# Patient Record
Sex: FEMALE | Race: WHITE | Employment: OTHER | URBAN - METROPOLITAN AREA
[De-identification: names, ages, dates, MRNs, and addresses within clinical notes are randomized per-mention and may not be internally consistent; named-entity substitution may affect disease eponyms.]

---

## 2017-02-02 ENCOUNTER — TELEPHONE (OUTPATIENT)
Dept: CARDIOLOGY CLINIC | Age: 72
End: 2017-02-02

## 2017-02-03 DIAGNOSIS — R00.2 PALPITATIONS: Primary | ICD-10-CM

## 2017-02-23 PROCEDURE — 93272 ECG/REVIEW INTERPRET ONLY: CPT | Performed by: INTERNAL MEDICINE

## 2017-03-10 ENCOUNTER — TELEPHONE (OUTPATIENT)
Dept: CARDIOLOGY CLINIC | Age: 72
End: 2017-03-10

## 2017-03-10 DIAGNOSIS — R00.2 PALPITATIONS: ICD-10-CM

## 2017-09-18 ENCOUNTER — HOSPITAL ENCOUNTER (OUTPATIENT)
Dept: MAMMOGRAPHY | Age: 72
Discharge: HOME | End: 2017-09-18
Attending: OBSTETRICS & GYNECOLOGY | Admitting: OBSTETRICS & GYNECOLOGY

## 2017-09-18 DIAGNOSIS — Z12.31 VISIT FOR SCREENING MAMMOGRAM: ICD-10-CM

## 2018-08-06 ENCOUNTER — OFFICE VISIT (OUTPATIENT)
Dept: CARDIOLOGY CLINIC | Age: 73
End: 2018-08-06

## 2018-08-06 VITALS
HEART RATE: 73 BPM | OXYGEN SATURATION: 95 % | DIASTOLIC BLOOD PRESSURE: 80 MMHG | SYSTOLIC BLOOD PRESSURE: 130 MMHG | BODY MASS INDEX: 22.16 KG/M2 | WEIGHT: 133 LBS | HEIGHT: 65 IN

## 2018-08-06 DIAGNOSIS — I48.91 ATRIAL FIBRILLATION, UNSPECIFIED TYPE (HCC): ICD-10-CM

## 2018-08-06 DIAGNOSIS — Z76.89 ESTABLISHING CARE WITH NEW DOCTOR, ENCOUNTER FOR: Primary | ICD-10-CM

## 2018-08-06 PROCEDURE — 3017F COLORECTAL CA SCREEN DOC REV: CPT | Performed by: INTERNAL MEDICINE

## 2018-08-06 PROCEDURE — 93000 ELECTROCARDIOGRAM COMPLETE: CPT | Performed by: INTERNAL MEDICINE

## 2018-08-06 PROCEDURE — 99204 OFFICE O/P NEW MOD 45 MIN: CPT | Performed by: INTERNAL MEDICINE

## 2018-08-06 PROCEDURE — G8420 CALC BMI NORM PARAMETERS: HCPCS | Performed by: INTERNAL MEDICINE

## 2018-08-06 PROCEDURE — 1101F PT FALLS ASSESS-DOCD LE1/YR: CPT | Performed by: INTERNAL MEDICINE

## 2018-08-06 PROCEDURE — G8427 DOCREV CUR MEDS BY ELIG CLIN: HCPCS | Performed by: INTERNAL MEDICINE

## 2018-08-06 PROCEDURE — 1090F PRES/ABSN URINE INCON ASSESS: CPT | Performed by: INTERNAL MEDICINE

## 2018-08-06 RX ORDER — PHENOL 1.4 %
1 AEROSOL, SPRAY (ML) MUCOUS MEMBRANE 2 TIMES DAILY PRN
COMMUNITY

## 2018-08-06 NOTE — PROGRESS NOTES
Negative. Gastrointestinal: Negative. Genitourinary: Negative. Musculoskeletal: Negative. Skin: Negative. Neurological: Negative. Hematological: Negative. Psychiatric/Behavioral: Negative. /80   Pulse 73   Ht 5' 4.9\" (1.648 m)   Wt 133 lb (60.3 kg)   SpO2 95%   BMI 22.20 kg/m²       Objective:  Physical Exam   Constitutional: She is oriented to person, place, and time. She appears well-developed and well-nourished. HENT:   Head: Normocephalic and atraumatic. Eyes: Pupils are equal, round, and reactive to light. Neck: Normal range of motion. Cardiovascular: Normal rate, regular rhythm and normal heart sounds. Pulmonary/Chest: Effort normal and breath sounds normal.   Abdominal: Soft. No tenderness. Musculoskeletal: Normal range of motion. She exhibits no edema. Neurological: She is alert and oriented to person, place, and time. Skin: Skin is warm and dry. Psychiatric: She has a normal mood and affect. Assessment:  1. PAF-cryoablation 3/9/12  2. PVC's- not quantified, appear uniform        Plan:  1. Remain active   2. Holter monitor to quantitate PVC's  3.  Follow up with me in 6 months     Cordelia Ribeiro M.D.

## 2018-08-14 PROCEDURE — 93225 XTRNL ECG REC<48 HRS REC: CPT | Performed by: INTERNAL MEDICINE

## 2018-08-17 PROCEDURE — 93227 XTRNL ECG REC<48 HR R&I: CPT | Performed by: INTERNAL MEDICINE

## 2018-09-07 DIAGNOSIS — I48.91 ATRIAL FIBRILLATION, UNSPECIFIED TYPE (HCC): ICD-10-CM

## 2018-09-14 ENCOUNTER — TELEPHONE (OUTPATIENT)
Dept: CARDIOLOGY CLINIC | Age: 73
End: 2018-09-14

## 2018-09-25 ENCOUNTER — OFFICE VISIT (OUTPATIENT)
Dept: ORTHOPEDIC SURGERY | Age: 73
End: 2018-09-25
Payer: MEDICARE

## 2018-09-25 VITALS — WEIGHT: 130 LBS | BODY MASS INDEX: 21.66 KG/M2 | HEIGHT: 65 IN

## 2018-09-25 DIAGNOSIS — M25.511 RIGHT SHOULDER PAIN, UNSPECIFIED CHRONICITY: Primary | ICD-10-CM

## 2018-09-25 DIAGNOSIS — M75.41 IMPINGEMENT SYNDROME OF RIGHT SHOULDER: ICD-10-CM

## 2018-09-25 PROCEDURE — G8427 DOCREV CUR MEDS BY ELIG CLIN: HCPCS | Performed by: PHYSICIAN ASSISTANT

## 2018-09-25 PROCEDURE — G8399 PT W/DXA RESULTS DOCUMENT: HCPCS | Performed by: PHYSICIAN ASSISTANT

## 2018-09-25 PROCEDURE — 1101F PT FALLS ASSESS-DOCD LE1/YR: CPT | Performed by: PHYSICIAN ASSISTANT

## 2018-09-25 PROCEDURE — 4040F PNEUMOC VAC/ADMIN/RCVD: CPT | Performed by: PHYSICIAN ASSISTANT

## 2018-09-25 PROCEDURE — G8420 CALC BMI NORM PARAMETERS: HCPCS | Performed by: PHYSICIAN ASSISTANT

## 2018-09-25 PROCEDURE — 99213 OFFICE O/P EST LOW 20 MIN: CPT | Performed by: PHYSICIAN ASSISTANT

## 2018-09-25 PROCEDURE — 1036F TOBACCO NON-USER: CPT | Performed by: PHYSICIAN ASSISTANT

## 2018-09-25 PROCEDURE — 3017F COLORECTAL CA SCREEN DOC REV: CPT | Performed by: PHYSICIAN ASSISTANT

## 2018-09-25 PROCEDURE — 1123F ACP DISCUSS/DSCN MKR DOCD: CPT | Performed by: PHYSICIAN ASSISTANT

## 2018-09-25 PROCEDURE — 1090F PRES/ABSN URINE INCON ASSESS: CPT | Performed by: PHYSICIAN ASSISTANT

## 2018-09-25 PROCEDURE — 20610 DRAIN/INJ JOINT/BURSA W/O US: CPT | Performed by: PHYSICIAN ASSISTANT

## 2018-09-25 RX ORDER — IBUPROFEN 200 MG
200 TABLET ORAL EVERY 6 HOURS PRN
Status: ON HOLD | COMMUNITY
End: 2021-07-15

## 2018-09-25 NOTE — PROGRESS NOTES
Bupivacaine . 25% 125mg/50mL NDC: [id number]  4cc  Lot # [id number]  Exp 6/22  Carbocaine 2% NDC: 1218-6357-09  4cc  Lot # [id number] Exp 1/1/2020  Depo-Medrol 40mg/mL  NDC: 0615-1906-20  2cc  Lot# [id number]  Exp  08/2020    SITE: RIGHT SHLD SUBACROMIAL
Laterality Date    ATRIAL ABLATION SURGERY  3/2012     Current Medications:     Current Outpatient Prescriptions:     ibuprofen (ADVIL;MOTRIN) 200 MG tablet, Take 200 mg by mouth every 6 hours as needed for Pain, Disp: , Rfl:     calcium carbonate 600 MG TABS tablet, Take 1 tablet by mouth 2 times daily as needed, Disp: , Rfl:     venlafaxine (EFFEXOR-XR) 75 MG XR capsule, Take 75 mg by mouth daily. , Disp: , Rfl:     diclofenac (VOLTAREN) 75 MG EC tablet, Take 1 tablet by mouth 2 times daily, Disp: 42 tablet, Rfl: 0    aspirin 81 MG tablet, Take 81 mg by mouth daily. , Disp: , Rfl:   Allergies:  Cephalexin and Pcn [penicillins]  Social History:    reports that she quit smoking about 39 years ago. She has never used smokeless tobacco. She reports that she drinks about 3.0 oz of alcohol per week . She reports that she does not use drugs. Family History:   No family history on file. REVIEW OF SYSTEMS:   For new problems, a full review of systems will be found scanned in the patient's chart. CONSTITUTIONAL: Denies unexplained weight loss, fevers, chills   NEUROLOGICAL: Denies unsteady gait or progressive weakness  SKIN: Denies skin changes, delayed healing, rash, itching       PHYSICAL EXAM:    Vitals: Height 5' 5\" (1.651 m), weight 130 lb (59 kg). GENERAL EXAM:  · General Apparence: Patient is adequately groomed with no evidence of malnutrition. · Orientation: The patient is oriented to time, place and person. · Mood & Affect:The patient's mood and affect are appropriate       RIGHT shoulder PHYSICAL EXAMINATION:  · Inspection:  No deformity ecchymosis or erythema    · Palpation:  No tenderness at the a.c. joint or distal acromion, mild meniscus couple tenderness through the trapezius      · Range of Motion: 0-180° of forward flexion, 0-160° of abduction. Bicep and tricep function are intact    · Strength: Mild to moderate pain but no weakness with supraspinatus testing.     · Special Tests:  She can

## 2018-10-09 ENCOUNTER — HOSPITAL ENCOUNTER (OUTPATIENT)
Dept: WOMENS IMAGING | Age: 73
Discharge: HOME | End: 2018-10-09
Payer: MEDICARE

## 2018-10-09 DIAGNOSIS — Z12.31 VISIT FOR SCREENING MAMMOGRAM: ICD-10-CM

## 2018-10-09 PROCEDURE — 77063 BREAST TOMOSYNTHESIS BI: CPT

## 2018-11-12 ENCOUNTER — OFFICE VISIT (OUTPATIENT)
Dept: ORTHOPEDIC SURGERY | Age: 73
End: 2018-11-12
Payer: MEDICARE

## 2018-11-12 VITALS — WEIGHT: 130.07 LBS | BODY MASS INDEX: 21.67 KG/M2 | HEIGHT: 65 IN

## 2018-11-12 DIAGNOSIS — M54.50 LUMBAR SPINE PAIN: ICD-10-CM

## 2018-11-12 DIAGNOSIS — M25.511 RIGHT SHOULDER PAIN, UNSPECIFIED CHRONICITY: Primary | ICD-10-CM

## 2018-11-12 PROCEDURE — G8427 DOCREV CUR MEDS BY ELIG CLIN: HCPCS | Performed by: PHYSICIAN ASSISTANT

## 2018-11-12 PROCEDURE — G8420 CALC BMI NORM PARAMETERS: HCPCS | Performed by: PHYSICIAN ASSISTANT

## 2018-11-12 PROCEDURE — 4040F PNEUMOC VAC/ADMIN/RCVD: CPT | Performed by: PHYSICIAN ASSISTANT

## 2018-11-12 PROCEDURE — 99214 OFFICE O/P EST MOD 30 MIN: CPT | Performed by: PHYSICIAN ASSISTANT

## 2018-11-12 PROCEDURE — 1123F ACP DISCUSS/DSCN MKR DOCD: CPT | Performed by: PHYSICIAN ASSISTANT

## 2018-11-12 PROCEDURE — G8484 FLU IMMUNIZE NO ADMIN: HCPCS | Performed by: PHYSICIAN ASSISTANT

## 2018-11-12 PROCEDURE — 1090F PRES/ABSN URINE INCON ASSESS: CPT | Performed by: PHYSICIAN ASSISTANT

## 2018-11-12 PROCEDURE — G8399 PT W/DXA RESULTS DOCUMENT: HCPCS | Performed by: PHYSICIAN ASSISTANT

## 2018-11-12 PROCEDURE — 1101F PT FALLS ASSESS-DOCD LE1/YR: CPT | Performed by: PHYSICIAN ASSISTANT

## 2018-11-12 PROCEDURE — 3017F COLORECTAL CA SCREEN DOC REV: CPT | Performed by: PHYSICIAN ASSISTANT

## 2018-11-12 PROCEDURE — 1036F TOBACCO NON-USER: CPT | Performed by: PHYSICIAN ASSISTANT

## 2018-11-12 RX ORDER — TIZANIDINE 4 MG/1
4 TABLET ORAL 2 TIMES DAILY PRN
Qty: 40 TABLET | Refills: 0 | Status: ON HOLD | OUTPATIENT
Start: 2018-11-12 | End: 2021-07-15

## 2018-11-13 ENCOUNTER — HOSPITAL ENCOUNTER (OUTPATIENT)
Dept: MRI IMAGING | Age: 73
Discharge: HOME | End: 2018-11-13
Payer: MEDICARE

## 2018-11-13 DIAGNOSIS — M25.511 RIGHT SHOULDER PAIN, UNSPECIFIED CHRONICITY: ICD-10-CM

## 2018-11-13 PROCEDURE — 73221 MRI JOINT UPR EXTREM W/O DYE: CPT

## 2018-11-19 ENCOUNTER — OFFICE VISIT (OUTPATIENT)
Dept: ORTHOPEDIC SURGERY | Age: 73
End: 2018-11-19
Payer: MEDICARE

## 2018-11-19 VITALS — BODY MASS INDEX: 21.67 KG/M2 | WEIGHT: 130.07 LBS | HEIGHT: 65 IN

## 2018-11-19 DIAGNOSIS — M75.121 COMPLETE TEAR OF RIGHT ROTATOR CUFF: Primary | ICD-10-CM

## 2018-11-19 PROCEDURE — 4040F PNEUMOC VAC/ADMIN/RCVD: CPT | Performed by: ORTHOPAEDIC SURGERY

## 2018-11-19 PROCEDURE — 1101F PT FALLS ASSESS-DOCD LE1/YR: CPT | Performed by: ORTHOPAEDIC SURGERY

## 2018-11-19 PROCEDURE — 1090F PRES/ABSN URINE INCON ASSESS: CPT | Performed by: ORTHOPAEDIC SURGERY

## 2018-11-19 PROCEDURE — 1036F TOBACCO NON-USER: CPT | Performed by: ORTHOPAEDIC SURGERY

## 2018-11-19 PROCEDURE — G8427 DOCREV CUR MEDS BY ELIG CLIN: HCPCS | Performed by: ORTHOPAEDIC SURGERY

## 2018-11-19 PROCEDURE — G8420 CALC BMI NORM PARAMETERS: HCPCS | Performed by: ORTHOPAEDIC SURGERY

## 2018-11-19 PROCEDURE — G8484 FLU IMMUNIZE NO ADMIN: HCPCS | Performed by: ORTHOPAEDIC SURGERY

## 2018-11-19 PROCEDURE — 99214 OFFICE O/P EST MOD 30 MIN: CPT | Performed by: ORTHOPAEDIC SURGERY

## 2018-11-19 PROCEDURE — 1123F ACP DISCUSS/DSCN MKR DOCD: CPT | Performed by: ORTHOPAEDIC SURGERY

## 2018-11-19 PROCEDURE — 3017F COLORECTAL CA SCREEN DOC REV: CPT | Performed by: ORTHOPAEDIC SURGERY

## 2018-11-19 PROCEDURE — G8399 PT W/DXA RESULTS DOCUMENT: HCPCS | Performed by: ORTHOPAEDIC SURGERY

## 2018-11-19 NOTE — PROGRESS NOTES
CHIEF COMPLAINT:    Chief Complaint   Patient presents with    Results     TR MRI RIGHT SHOULDER       HISTORY OF PRESENT ILLNESS:                The patient is a 68 y.o. female   Past Medical History:   Diagnosis Date    Anesthesia complication     Low B/P after atrial ablation (SBP 60)    Anxiety     Atrial fibrillation (HCC)     CAD (coronary artery disease) 5/11/12    at fib    Rotator cuff tendinitis 2/15/2016    This pleasant lady returns for evaluation after MRI scan. She is referred to shoulder 3 years ago and went to physical therapy. She completely recovered and then was doing some work at several weeks ago in September and had acute onset of pain with significant disability involving the shoulder. With ongoing symptoms and MRI scan was recommended. She still has pain. Shot helped for several weeks but has worn off from September. Work Status:      The pain assessment was noted & is as follows:  Pain Assessment  Location of Pain: Shoulder  Location Modifiers: Right  Severity of Pain: 8  Quality of Pain: Aching  Duration of Pain: A few minutes  Frequency of Pain: Intermittent  Aggravating Factors: Bending, Straightening, Stretching, Exercise]      Work Status/Functionality:     Past Medical History: Medical history form was reviewed today & can be found in the media tab  Past Medical History:   Diagnosis Date    Anesthesia complication     Low B/P after atrial ablation (SBP 60)    Anxiety     Atrial fibrillation (HCC)     CAD (coronary artery disease) 5/11/12    at fib    Rotator cuff tendinitis 2/15/2016      Past Surgical History:     Past Surgical History:   Procedure Laterality Date    ATRIAL ABLATION SURGERY  3/2012     Current Medications:     Current Outpatient Prescriptions:     tiZANidine (ZANAFLEX) 4 MG tablet, Take 1 tablet by mouth 2 times daily as needed (muscle spasms), Disp: 40 tablet, Rfl: 0    ibuprofen (ADVIL;MOTRIN) 200 MG tablet, Take 200 mg by mouth every 6

## 2018-11-27 ENCOUNTER — HOSPITAL ENCOUNTER (OUTPATIENT)
Dept: PHYSICAL THERAPY | Age: 73
Setting detail: THERAPIES SERIES
Discharge: HOME | End: 2018-11-27
Payer: MEDICARE

## 2018-11-27 PROCEDURE — G8984 CARRY CURRENT STATUS: HCPCS | Performed by: PHYSICAL THERAPIST

## 2018-11-27 PROCEDURE — 97162 PT EVAL MOD COMPLEX 30 MIN: CPT | Performed by: PHYSICAL THERAPIST

## 2018-11-27 PROCEDURE — G0283 ELEC STIM OTHER THAN WOUND: HCPCS | Performed by: PHYSICAL THERAPIST

## 2018-11-27 PROCEDURE — 97140 MANUAL THERAPY 1/> REGIONS: CPT | Performed by: PHYSICAL THERAPIST

## 2018-11-27 PROCEDURE — G8985 CARRY GOAL STATUS: HCPCS | Performed by: PHYSICAL THERAPIST

## 2018-11-27 PROCEDURE — 97110 THERAPEUTIC EXERCISES: CPT | Performed by: PHYSICAL THERAPIST

## 2018-11-27 NOTE — FLOWSHEET NOTE
[facility],  [address]  Phone: [phone]  Fax [phone]      Physical Therapy Daily Treatment Note  Date:  2018    Patient Name:  Chris Dong    :  1945  MRN: [id number]  Restrictions/Precautions:    Medical/Treatment Diagnosis Information:  · Diagnosis: M75.121 tear of R Rotator cuff  · Treatment Diagnosis: M75.121 tear of R Rotator cuff, R shld pain J83.899D  Insurance/Certification information:  PT Insurance Information: / AARP  Physician Information:  Referring Practitioner: Dr. Angelic Plascencia of care signed (Y/N):     Date of Patient follow up with Physician: 18    G-Code (if applicable):      Date G-Code Applied:    PT G-Codes  Functional Assessment Tool Used: Quick dash  Score: 64%  Functional Limitation: Carrying, moving and handling objects  Carrying, Moving and Handling Objects Current Status (): At least 60 percent but less than 80 percent impaired, limited or restricted  Carrying, Moving and Handling Objects Goal Status ():  At least 20 percent but less than 40 percent impaired, limited or restricted    Progress Note: [x]  Yes  []  No  Next due by: Visit #10      Latex Allergy:  [x]NO      []YES  Preferred Language for Healthcare:   [x]English       []other:    Visit # Insurance Allowable Requires auth   1 /AARP    [x]no        []yes:     Pain level:  2-8/10     SUBJECTIVE:  See eval    OBJECTIVE: See eval  Observation:   Test measurements:      RESTRICTIONS/PRECAUTIONS: MRI: full thickness supraspinatus and mild involvement sub-scap    Exercises/Interventions:   Therapeutic Ex Sets/rep comments        Table slides flex/scap H10x10 reps HEP        No money H5 x10 reps HEP        shld ext clarice H5 x10 reps HEP                                                                    Manual Intervention     GH joint mobs A-P/inferior 8' Less pain with abd/flex AROM after to co-morbidities. [x] Plan just implemented, too soon to assess goals progression  [] Other:     ASSESSMENT:  See eval    Treatment/Activity Tolerance:  [x] Patient tolerated treatment well [] Patient limited by fatique  [] Patient limited by pain  [] Patient limited by other medical complications  [] Other:     Prognosis: [x] Good [x] Fair  [] Poor    Patient Requires Follow-up: [x] Yes  [] No    PLAN: See eval. Progress scapular stabilization as tolerated.   [] Continue per plan of care [] Alter current plan (see comments)  [x] Plan of care initiated [] Hold pending MD visit [] Discharge    Electronically signed by: Dasiy Osullivan, PT

## 2018-11-27 NOTE — PLAN OF CARE
Review Of Systems (ROS):  [x]Performed Review of systems (Integumentary, CardioPulmonary, Neurological) by intake and observation. Intake form has been scanned into medical record. Patient has been instructed to contact their primary care physician regarding ROS issues if not already being addressed at this time. Co-morbidities/Complexities (which will affect course of rehabilitation):   []None           Arthritic conditions   []Rheumatoid arthritis (M05.9)  []Osteoarthritis (M19.91)   Cardiovascular conditions   [x]Hypertension (I10)  []Hyperlipidemia (E78.5)  []Angina pectoris (I20)  []Atherosclerosis (I70)   Musculoskeletal conditions   []Disc pathology   []Congenital spine pathologies   []Prior surgical intervention  []Osteoporosis (M81.8)  [x]Osteopenia (M85.8)   Endocrine conditions   []Hypothyroid (E03.9)  []Hyperthyroid Gastrointestinal conditions   []Constipation (X89.37)   Metabolic conditions   []Morbid obesity (E66.01)  []Diabetes type 1(E10.65) or 2 (E11.65)   []Neuropathy (G60.9)     Pulmonary conditions   []Asthma (J45)  []Coughing   []COPD (J44.9)   Psychological Disorders  [x]Anxiety (F41.9)  []Depression (F32.9)   []Other:   [x]Other:    Full thickness tear of supraspinatus       Barriers to/and or personal factors that will affect rehab potential:              []Age  []Sex              []Motivation/Lack of Motivation                        [x]Co-Morbidities              []Cognitive Function, education/learning barriers              []Environmental, home barriers              []profession/work barriers  [x]past PT/medical experience  []other:  Justification:  No prior relief noted for R shld with previous therapy     Falls Risk Assessment (30 days):   [x] Falls Risk assessed and no intervention required.   [] Falls Risk assessed and Patient requires intervention due to being higher risk   TUG score (>12s at risk):     [] Falls education provided, including       G-Codes:  PT

## 2018-11-30 ENCOUNTER — HOSPITAL ENCOUNTER (OUTPATIENT)
Dept: PHYSICAL THERAPY | Age: 73
Setting detail: THERAPIES SERIES
Discharge: HOME | End: 2018-11-30
Payer: MEDICARE

## 2018-11-30 PROCEDURE — 97140 MANUAL THERAPY 1/> REGIONS: CPT | Performed by: PHYSICAL THERAPIST

## 2018-11-30 PROCEDURE — G0283 ELEC STIM OTHER THAN WOUND: HCPCS | Performed by: PHYSICAL THERAPIST

## 2018-11-30 PROCEDURE — 97110 THERAPEUTIC EXERCISES: CPT | Performed by: PHYSICAL THERAPIST

## 2018-11-30 PROCEDURE — 97112 NEUROMUSCULAR REEDUCATION: CPT | Performed by: PHYSICAL THERAPIST

## 2018-11-30 NOTE — FLOWSHEET NOTE
shld after treatment    Charges:  Timed Code Treatment Minutes: 40'   Total Treatment Minutes: 54'     [] EVAL (LOW) 05652 (typically 20 minutes face-to-face)  [x] EVAL (MOD) 73316 (typically 30 minutes face-to-face)  [] EVAL (HIGH) 25676 (typically 45 minutes face-to-face)  [] RE-EVAL     [x] RK(30916) x  1   [] IONTO  [x] NMR (15084) x  1   [] VASO  [x] Manual (40061) x  1    [] Other:  [] TA x       [] Mech Traction (18413)  [] ES(attended) (28634)      [x] ES (un) (39630):     GOALS: (cut and paste from eval)  Patient stated goal: Decrease pain     Therapist goals for Patient:   Short Term Goals: To be achieved in: 2 weeks  1. Independent in HEP and progression per patient tolerance, in order to prevent re-injury. 2. Patient will have a decrease in pain to facilitate improvement in movement, function, and ADLs as indicated by Functional Deficits.     Long Term Goals: To be achieved in: 6 weeks  1. Disability index score of 35% or less for the [facility] to assist with reaching prior level of function. 2. Patient will demonstrate increased AROM to WNL without pain to allow for proper joint functioning as indicated by patients Functional Deficits. 3. Patient will demonstrate an increase in Strength to 4/5 to allow for proper functional mobility as indicated by patients Functional Deficits. 4. Patient will return to reaching overhead to fix and dry hair functional activities without increased symptoms or restriction. 5. Patient to be able to open a jar without shld pain and weakness(patient specific functional goal)                  Progression Towards Functional goals:  [] Patient is progressing as expected towards functional goals listed. [] Progression is slowed due to complexities listed. [] Progression has been slowed due to co-morbidities. [x] Plan just implemented, too soon to assess goals progression  [] Other:     ASSESSMENT: Increased PROM noted.     Treatment/Activity Tolerance:  [x] Patient tolerated treatment well [] Patient limited by fatique  [] Patient limited by pain  [] Patient limited by other medical complications  [] Other:     Prognosis: [x] Good [x] Fair  [] Poor    Patient Requires Follow-up: [x] Yes  [] No    PLAN: See eval. Progress scapular stabilization as tolerated.   [x] Continue per plan of care [] Alter current plan (see comments)  [] Plan of care initiated [] Hold pending MD visit [] Discharge    Electronically signed by: Isabel Edwards PT

## 2018-12-04 ENCOUNTER — HOSPITAL ENCOUNTER (OUTPATIENT)
Dept: PHYSICAL THERAPY | Age: 73
Setting detail: THERAPIES SERIES
Discharge: HOME | End: 2018-12-04
Payer: MEDICARE

## 2018-12-04 PROCEDURE — G0283 ELEC STIM OTHER THAN WOUND: HCPCS | Performed by: PHYSICAL THERAPIST

## 2018-12-04 PROCEDURE — 97112 NEUROMUSCULAR REEDUCATION: CPT | Performed by: PHYSICAL THERAPIST

## 2018-12-04 PROCEDURE — 97140 MANUAL THERAPY 1/> REGIONS: CPT | Performed by: PHYSICAL THERAPIST

## 2018-12-04 PROCEDURE — 97110 THERAPEUTIC EXERCISES: CPT | Performed by: PHYSICAL THERAPIST

## 2018-12-07 ENCOUNTER — HOSPITAL ENCOUNTER (OUTPATIENT)
Dept: PHYSICAL THERAPY | Age: 73
Setting detail: THERAPIES SERIES
Discharge: HOME | End: 2018-12-07
Payer: MEDICARE

## 2018-12-07 PROCEDURE — G0283 ELEC STIM OTHER THAN WOUND: HCPCS | Performed by: PHYSICAL THERAPIST

## 2018-12-07 PROCEDURE — 97140 MANUAL THERAPY 1/> REGIONS: CPT | Performed by: PHYSICAL THERAPIST

## 2018-12-07 PROCEDURE — 97110 THERAPEUTIC EXERCISES: CPT | Performed by: PHYSICAL THERAPIST

## 2018-12-07 PROCEDURE — 97112 NEUROMUSCULAR REEDUCATION: CPT | Performed by: PHYSICAL THERAPIST

## 2018-12-11 ENCOUNTER — HOSPITAL ENCOUNTER (OUTPATIENT)
Dept: PHYSICAL THERAPY | Age: 73
Setting detail: THERAPIES SERIES
Discharge: HOME | End: 2018-12-11
Payer: MEDICARE

## 2018-12-11 PROCEDURE — 97112 NEUROMUSCULAR REEDUCATION: CPT | Performed by: PHYSICAL THERAPIST

## 2018-12-11 PROCEDURE — G0283 ELEC STIM OTHER THAN WOUND: HCPCS | Performed by: PHYSICAL THERAPIST

## 2018-12-11 PROCEDURE — 97140 MANUAL THERAPY 1/> REGIONS: CPT | Performed by: PHYSICAL THERAPIST

## 2018-12-11 PROCEDURE — 97110 THERAPEUTIC EXERCISES: CPT | Performed by: PHYSICAL THERAPIST

## 2018-12-14 ENCOUNTER — HOSPITAL ENCOUNTER (OUTPATIENT)
Dept: PHYSICAL THERAPY | Age: 73
Setting detail: THERAPIES SERIES
Discharge: HOME | End: 2018-12-14
Payer: MEDICARE

## 2018-12-14 PROCEDURE — 97140 MANUAL THERAPY 1/> REGIONS: CPT | Performed by: PHYSICAL THERAPIST

## 2018-12-14 PROCEDURE — G0283 ELEC STIM OTHER THAN WOUND: HCPCS | Performed by: PHYSICAL THERAPIST

## 2018-12-14 PROCEDURE — 97112 NEUROMUSCULAR REEDUCATION: CPT | Performed by: PHYSICAL THERAPIST

## 2018-12-14 PROCEDURE — 97110 THERAPEUTIC EXERCISES: CPT | Performed by: PHYSICAL THERAPIST

## 2018-12-14 NOTE — FLOWSHEET NOTE
[facility], [address] [place]  Phone: [phone]  Fax [phone]      Physical Therapy Daily Treatment Note  Date:  2018    Patient Name:  Tosha Fleming \"Anjali\"   :  1945  MRN: [id number]  Restrictions/Precautions:    Medical/Treatment Diagnosis Information:  · Diagnosis: M75.121 tear of R Rotator cuff  · Treatment Diagnosis: M75.121 tear of R Rotator cuff, R shld pain P76.547W  Insurance/Certification information:  PT Insurance Information: / AARP  Physician Information:  Referring Practitioner: Dr. [facility] of care signed (Y/N):     Date of Patient follow up with Physician: 18    G-Code (if applicable):      Date G-Code Applied:         Progress Note: []  Yes  [x]  No  Next due by: Visit #10      Latex Allergy:  [x]NO      []YES  Preferred Language for Healthcare:   [x]English       []other:    Visit # Insurance Allowable Requires auth   6 MC/AARP    [x]no        []yes:     Pain level:  2-5/10     SUBJECTIVE:  Reports no pain at rest. With movement pain is 5/10 with movement. Reports feeling better from last wek with R shld.  Reports overall 30%-40%  Improvement since starting PT.  OBJECTIVE:   Observation:   Test measurements:      RESTRICTIONS/PRECAUTIONS: MRI: full thickness supraspinatus and mild involvement sub-scap    Exercises/Interventions:   Therapeutic Ex Sets/rep comments        Table slides flex/scap H10x10 reps HEP        No money YTB H5 2 x10 reps + YTB HEP        shld ext clarice HEP   shld abd/Er  HEP   SA punches 1# 2x15    Supine alphabet 1#RTB ER/IR walkouts H5 x10 reps    RTB Rows/Ext H5 2x10 reps  HEP   SL ER H5 2x10 reps    SL horizontal abd 1# 2x10    SL abd 1# 2x10 reps    IR with hand assist     Posterior capsule stretches against wall H20 x 5 reps    Prone shld ext/Rows          Finisher push/pull/ladders/ circles/arcs/diagonals 15 reps each 5#   Pulleys 20x Manual Intervention     GH joint mobs A-P/inferior 8' Less pain with abd/flex AROM after treatment                                 NMR re-education                                                 Therapeutic Exercise and NMR EXR  [x] (97129) Provided verbal/tactile cueing for activities related to strengthening, flexibility, endurance, ROM  for improvements in scapular, scapulothoracic and UE control with self care, reaching, carrying, lifting, house/yardwork, driving/computer work.    [] (62174) Provided verbal/tactile cueing for activities related to improving balance, coordination, kinesthetic sense, posture, motor skill, proprioception  to assist with  scapular, scapulothoracic and UE control with self care, reaching, carrying, lifting, house/yardwork, driving/computer work. Therapeutic Activities:    [] (69040 or 32292) Provided verbal/tactile cueing for activities related to improving balance, coordination, kinesthetic sense, posture, motor skill, proprioception and motor activation to allow for proper function of scapular, scapulothoracic and UE control with self care, carrying, lifting, driving/computer work.      Home Exercise Program:    [x] (97429) Reviewed/Progressed HEP activities related to strengthening, flexibility, endurance, ROM of scapular, scapulothoracic and UE control with self care, reaching, carrying, lifting, house/yardwork, driving/computer work  [] (01641) Reviewed/Progressed HEP activities related to improving balance, coordination, kinesthetic sense, posture, motor skill, proprioception of scapular, scapulothoracic and UE control with self care, reaching, carrying, lifting, house/yardwork, driving/computer work      Manual Treatments:  PROM / STM / Oscillations-Mobs:  G-I, II, III, IV (PA's, Inf., Post.)  [x] (94285) Provided manual therapy to mobilize soft tissue/joints of cervical/CT, scapular GHJ and UE for the purpose of modulating pain, promoting relaxation,  increasing ROM, [] Progression is slowed due to complexities listed. [] Progression has been slowed due to co-morbidities. [] Plan just implemented, too soon to assess goals progression  [] Other:     ASSESSMENT: Increased PROM noted. Increased AROM behind back. Treatment/Activity Tolerance:  [x] Patient tolerated treatment well [] Patient limited by fatique  [] Patient limited by pain  [] Patient limited by other medical complications  [] Other:     Prognosis: [x] Good [x] Fair  [] Poor    Patient Requires Follow-up: [x] Yes  [] No    PLAN: See eval. Progress scapular stabilization as tolerated.   [x] Continue per plan of care [] Alter current plan (see comments)  [] Plan of care initiated [] Hold pending MD visit [] Discharge    Electronically signed by: Leslee Hager PT

## 2018-12-17 ENCOUNTER — OFFICE VISIT (OUTPATIENT)
Dept: ORTHOPEDIC SURGERY | Age: 73
End: 2018-12-17
Payer: MEDICARE

## 2018-12-17 VITALS — HEIGHT: 65 IN | BODY MASS INDEX: 21.67 KG/M2 | WEIGHT: 130.07 LBS

## 2018-12-17 DIAGNOSIS — M75.121 COMPLETE TEAR OF RIGHT ROTATOR CUFF: Primary | ICD-10-CM

## 2018-12-17 PROCEDURE — 4040F PNEUMOC VAC/ADMIN/RCVD: CPT | Performed by: PHYSICIAN ASSISTANT

## 2018-12-17 PROCEDURE — G8427 DOCREV CUR MEDS BY ELIG CLIN: HCPCS | Performed by: PHYSICIAN ASSISTANT

## 2018-12-17 PROCEDURE — G8484 FLU IMMUNIZE NO ADMIN: HCPCS | Performed by: PHYSICIAN ASSISTANT

## 2018-12-17 PROCEDURE — 1123F ACP DISCUSS/DSCN MKR DOCD: CPT | Performed by: PHYSICIAN ASSISTANT

## 2018-12-17 PROCEDURE — 1090F PRES/ABSN URINE INCON ASSESS: CPT | Performed by: PHYSICIAN ASSISTANT

## 2018-12-17 PROCEDURE — 1101F PT FALLS ASSESS-DOCD LE1/YR: CPT | Performed by: PHYSICIAN ASSISTANT

## 2018-12-17 PROCEDURE — 1036F TOBACCO NON-USER: CPT | Performed by: PHYSICIAN ASSISTANT

## 2018-12-17 PROCEDURE — G8399 PT W/DXA RESULTS DOCUMENT: HCPCS | Performed by: PHYSICIAN ASSISTANT

## 2018-12-17 PROCEDURE — 99213 OFFICE O/P EST LOW 20 MIN: CPT | Performed by: PHYSICIAN ASSISTANT

## 2018-12-17 PROCEDURE — 3017F COLORECTAL CA SCREEN DOC REV: CPT | Performed by: PHYSICIAN ASSISTANT

## 2018-12-17 PROCEDURE — G8420 CALC BMI NORM PARAMETERS: HCPCS | Performed by: PHYSICIAN ASSISTANT

## 2018-12-17 NOTE — PROGRESS NOTES
200 mg by mouth every 6 hours as needed for Pain, Disp: , Rfl:     calcium carbonate 600 MG TABS tablet, Take 1 tablet by mouth 2 times daily as needed, Disp: , Rfl:     diclofenac (VOLTAREN) 75 MG EC tablet, Take 1 tablet by mouth 2 times daily, Disp: 42 tablet, Rfl: 0    aspirin 81 MG tablet, Take 81 mg by mouth daily. , Disp: , Rfl:     venlafaxine (EFFEXOR-XR) 75 MG XR capsule, Take 75 mg by mouth daily. , Disp: , Rfl:   Allergies:  Cephalexin and Pcn [penicillins]  Social History:    reports that she quit smoking about 39 years ago. She has never used smokeless tobacco. She reports that she drinks about 3.0 oz of alcohol per week . She reports that she does not use drugs. Family History:   History reviewed. No pertinent family history. REVIEW OF SYSTEMS:   For new problems, a full review of systems will be found scanned in the patient's chart. CONSTITUTIONAL: Denies unexplained weight loss, fevers, chills   NEUROLOGICAL: Denies unsteady gait or progressive weakness  SKIN: Denies skin changes, delayed healing, rash, itching       PHYSICAL EXAM:    Vitals: Height 5' 5\" (1.651 m), weight 130 lb 1.1 oz (59 kg). GENERAL EXAM:  · General Apparence: Patient is adequately groomed with no evidence of malnutrition. · Orientation: The patient is oriented to time, place and person. · Mood & Affect:The patient's mood and affect are appropriate       Right shoulder PHYSICAL EXAMINATION:  · Inspection:  No visible deformity. No significant edema, erythema or ecchymosis. · Palpation:  Tenderness to palpation at the subacromial space, improving      · Range of Motion: She is able to perform full overhead range of motion with forward flexion. Abduction is limited to approximately 90° actively. · Strength: Strength is improving with physical therapy    · Special Tests:  None performed today            · Skin:  There are no rashes, ulcerations or lesions.   · There are no dysvascular changes     Gait &

## 2018-12-18 ENCOUNTER — HOSPITAL ENCOUNTER (OUTPATIENT)
Dept: PHYSICAL THERAPY | Age: 73
Setting detail: THERAPIES SERIES
Discharge: HOME | End: 2018-12-18
Payer: MEDICARE

## 2018-12-18 PROCEDURE — 97110 THERAPEUTIC EXERCISES: CPT | Performed by: PHYSICAL THERAPIST

## 2018-12-18 PROCEDURE — G0283 ELEC STIM OTHER THAN WOUND: HCPCS | Performed by: PHYSICAL THERAPIST

## 2018-12-18 PROCEDURE — 97140 MANUAL THERAPY 1/> REGIONS: CPT | Performed by: PHYSICAL THERAPIST

## 2018-12-18 PROCEDURE — 97112 NEUROMUSCULAR REEDUCATION: CPT | Performed by: PHYSICAL THERAPIST

## 2018-12-21 ENCOUNTER — HOSPITAL ENCOUNTER (OUTPATIENT)
Dept: PHYSICAL THERAPY | Age: 73
Setting detail: THERAPIES SERIES
Discharge: HOME | End: 2018-12-21
Payer: MEDICARE

## 2018-12-21 PROCEDURE — G0283 ELEC STIM OTHER THAN WOUND: HCPCS | Performed by: PHYSICAL THERAPIST

## 2018-12-21 PROCEDURE — 97112 NEUROMUSCULAR REEDUCATION: CPT | Performed by: PHYSICAL THERAPIST

## 2018-12-21 PROCEDURE — 97140 MANUAL THERAPY 1/> REGIONS: CPT | Performed by: PHYSICAL THERAPIST

## 2018-12-21 PROCEDURE — 97110 THERAPEUTIC EXERCISES: CPT | Performed by: PHYSICAL THERAPIST

## 2018-12-21 NOTE — FLOWSHEET NOTE
[facility],  [address] [place]  Phone: [phone]  Fax [phone]      Physical Therapy Daily Treatment Note  Date:  2018    Patient Name:  Tosha Fleming \"Anjali\"   :  1945  MRN: [id number]  Restrictions/Precautions:    Medical/Treatment Diagnosis Information:  · Diagnosis: M75.121 tear of R Rotator cuff  · Treatment Diagnosis: M75.121 tear of R Rotator cuff, R shld pain O80.964N  Insurance/Certification information:  PT Insurance Information: / AARP  Physician Information:  Referring Practitioner: Dr. Page [facility] of care signed (Y/N):     Date of Patient follow up with Physician: 18    G-Code (if applicable):      Date G-Code Applied:         Progress Note: []  Yes  [x]  No  Next due by: Visit #10      Latex Allergy:  [x]NO      []YES  Preferred Language for Healthcare:   [x]English       []other:    Visit # Insurance Allowable Requires auth   8 MC/AARP    [x]no        []yes:     Pain level:  2-5/10     SUBJECTIVE:  Reports no pain at rest. Saw MD CORONEL yesterday. Pleased with progress. Can decrease frequency to 1x/week for PT. Reports increased pain since last visit. Thinks it is from new exercises with weight last visit. OBJECTIVE: Progressed HEP. Provided handouts.   Observation:   Test measurements:      RESTRICTIONS/PRECAUTIONS: MRI: full thickness supraspinatus and mild involvement sub-scap    Exercises/Interventions:   Therapeutic Ex Sets/rep comments        Table slides flex/scap H10x10 reps HEP        No money YTB H5 2 x10 reps  YTB HEP        shld ext clarice HEP   shld abd/Er  HEP   SA punches 1# 2x15 + HEP   Supine alphabet 1#RTB ER/IR walkouts H5 x10 reps    RTB Rows/Ext H5 2x10 reps  HEP   SL ER  +HEP   SL horizontal abd 2x10 + HEP   SL abd 2x10 reps + HEP   IR with hand assist     Posterior capsule stretches against wall H20 x 5 reps    Prone shld ext/Rows          Finisher

## 2019-01-02 ENCOUNTER — APPOINTMENT (OUTPATIENT)
Dept: PHYSICAL THERAPY | Age: 74
End: 2019-01-02
Payer: MEDICARE

## 2019-01-03 ENCOUNTER — HOSPITAL ENCOUNTER (OUTPATIENT)
Dept: PHYSICAL THERAPY | Age: 74
Setting detail: THERAPIES SERIES
Discharge: HOME | End: 2019-01-03
Payer: MEDICARE

## 2019-01-03 PROCEDURE — 97140 MANUAL THERAPY 1/> REGIONS: CPT | Performed by: PHYSICAL THERAPIST

## 2019-01-03 PROCEDURE — G0283 ELEC STIM OTHER THAN WOUND: HCPCS | Performed by: PHYSICAL THERAPIST

## 2019-01-03 PROCEDURE — G8985 CARRY GOAL STATUS: HCPCS | Performed by: PHYSICAL THERAPIST

## 2019-01-03 PROCEDURE — 97110 THERAPEUTIC EXERCISES: CPT | Performed by: PHYSICAL THERAPIST

## 2019-01-03 PROCEDURE — G8984 CARRY CURRENT STATUS: HCPCS | Performed by: PHYSICAL THERAPIST

## 2019-01-09 ENCOUNTER — APPOINTMENT (OUTPATIENT)
Dept: PHYSICAL THERAPY | Age: 74
End: 2019-01-09
Payer: MEDICARE

## 2019-01-16 ENCOUNTER — APPOINTMENT (OUTPATIENT)
Dept: PHYSICAL THERAPY | Age: 74
End: 2019-01-16
Payer: MEDICARE

## 2019-01-22 ENCOUNTER — TELEPHONE (OUTPATIENT)
Dept: CARDIOLOGY CLINIC | Age: 74
End: 2019-01-22

## 2019-02-12 ENCOUNTER — HOSPITAL ENCOUNTER (OUTPATIENT)
Dept: PHYSICAL THERAPY | Age: 74
Setting detail: THERAPIES SERIES
Discharge: HOME | End: 2019-02-12
Payer: MEDICARE

## 2019-02-12 PROCEDURE — G8985 CARRY GOAL STATUS: HCPCS | Performed by: PHYSICAL THERAPIST

## 2019-02-12 PROCEDURE — 97112 NEUROMUSCULAR REEDUCATION: CPT | Performed by: PHYSICAL THERAPIST

## 2019-02-12 PROCEDURE — G0283 ELEC STIM OTHER THAN WOUND: HCPCS | Performed by: PHYSICAL THERAPIST

## 2019-02-12 PROCEDURE — 97110 THERAPEUTIC EXERCISES: CPT | Performed by: PHYSICAL THERAPIST

## 2019-02-12 PROCEDURE — G8984 CARRY CURRENT STATUS: HCPCS | Performed by: PHYSICAL THERAPIST

## 2019-02-12 PROCEDURE — 97162 PT EVAL MOD COMPLEX 30 MIN: CPT | Performed by: PHYSICAL THERAPIST

## 2019-03-27 ENCOUNTER — HOSPITAL ENCOUNTER (OUTPATIENT)
Dept: PHYSICAL THERAPY | Age: 74
Setting detail: THERAPIES SERIES
Discharge: HOME | End: 2019-03-27
Payer: MEDICARE

## 2019-03-27 PROCEDURE — G8985 CARRY GOAL STATUS: HCPCS | Performed by: PHYSICAL THERAPIST

## 2019-03-27 PROCEDURE — G8984 CARRY CURRENT STATUS: HCPCS | Performed by: PHYSICAL THERAPIST

## 2019-03-27 PROCEDURE — 97140 MANUAL THERAPY 1/> REGIONS: CPT | Performed by: PHYSICAL THERAPIST

## 2019-03-27 PROCEDURE — 97110 THERAPEUTIC EXERCISES: CPT | Performed by: PHYSICAL THERAPIST

## 2019-03-27 PROCEDURE — G0283 ELEC STIM OTHER THAN WOUND: HCPCS | Performed by: PHYSICAL THERAPIST

## 2019-03-27 PROCEDURE — 97112 NEUROMUSCULAR REEDUCATION: CPT | Performed by: PHYSICAL THERAPIST

## 2019-04-02 ENCOUNTER — HOSPITAL ENCOUNTER (OUTPATIENT)
Dept: PHYSICAL THERAPY | Age: 74
Setting detail: THERAPIES SERIES
Discharge: HOME | End: 2019-04-02
Payer: MEDICARE

## 2019-04-02 PROCEDURE — G0283 ELEC STIM OTHER THAN WOUND: HCPCS | Performed by: PHYSICAL THERAPIST

## 2019-04-02 PROCEDURE — 97112 NEUROMUSCULAR REEDUCATION: CPT | Performed by: PHYSICAL THERAPIST

## 2019-04-02 PROCEDURE — 97140 MANUAL THERAPY 1/> REGIONS: CPT | Performed by: PHYSICAL THERAPIST

## 2019-04-02 PROCEDURE — 97110 THERAPEUTIC EXERCISES: CPT | Performed by: PHYSICAL THERAPIST

## 2019-04-02 NOTE — FLOWSHEET NOTE
Intact          Exercises/Interventions:   Therapeutic Ex Sets/rep comments   FAV 4 10 x10 reps HEP   Flex/scap table slides H10 x10 + scap to HEP   Elbow, wrist , hand AROM 10 reps HEP             ER with cane H10x 10   HEP     punch with assist and BP with hands clasped  Too painful with 1-2 reps so held    SL scap retraction H5 2x 10 reps    Pulleys 5 reps Painful        Phase 1 strengthening with UE assist 5 reps + to HEP                                           Manual Intervention     PROM R [facility]/ [facility] 1-2 mobs 15'                                  NMR re-education     Patient education, post-op restrictions/precautions 15'                                           Therapeutic Exercise and NMR EXR  [x] (28186) Provided verbal/tactile cueing for activities related to strengthening, flexibility, endurance, ROM  for improvements in scapular, scapulothoracic and UE control with self care, reaching, carrying, lifting, house/yardwork, driving/computer work.    [] (40594) Provided verbal/tactile cueing for activities related to improving balance, coordination, kinesthetic sense, posture, motor skill, proprioception  to assist with  scapular, scapulothoracic and UE control with self care, reaching, carrying, lifting, house/yardwork, driving/computer work. Therapeutic Activities:    [] (02811 or 24479) Provided verbal/tactile cueing for activities related to improving balance, coordination, kinesthetic sense, posture, motor skill, proprioception and motor activation to allow for proper function of scapular, scapulothoracic and UE control with self care, carrying, lifting, driving/computer work.      Home Exercise Program:    [x] (93582) Reviewed/Progressed HEP activities related to strengthening, flexibility, endurance, ROM of scapular, scapulothoracic and UE control with self care, reaching, carrying, lifting, house/yardwork, driving/computer work  [] (53856) Reviewed/Progressed HEP activities related to improving balance, coordination, kinesthetic sense, posture, motor skill, proprioception of scapular, scapulothoracic and UE control with self care, reaching, carrying, lifting, house/yardwork, driving/computer work      Manual Treatments:  PROM / STM / Oscillations-Mobs:  G-I, II, III, IV (PA's, Inf., Post.)  [x] (79588) Provided manual therapy to mobilize soft tissue/joints of cervical/CT, scapular GHJ and UE for the purpose of modulating pain, promoting relaxation,  increasing ROM, reducing/eliminating soft tissue swelling/inflammation/restriction, improving soft tissue extensibility and allowing for proper ROM for normal function with self care, reaching, carrying, lifting, house/yardwork, driving/computer work    Modalities: ES with CP to R shld x 15 min after treatment    Charges:  Timed Code Treatment Minutes: 45'   Total Treatment Minutes: 61'     [] EVAL (LOW) 61782 (typically 20 minutes face-to-face)  [x] EVAL (MOD) 40649 (typically 30 minutes face-to-face)  [] EVAL (HIGH) 96010 (typically 45 minutes face-to-face)  [] RE-EVAL     [x] LD(96207) x  1   [] IONTO  [x] NMR (73171) x  1   [] VASO  [x] Manual (21211) x  1    [] Other:  [] TA x       [] Mech Traction (09533)  [] ES(attended) (84800)      [x] ES (un) (57671):     GOALS: (cut and paste from eval)  Patient stated goal: Decrease pain     Therapist goals for Patient:   Short Term Goals: To be achieved in: 2 weeks  1. Independent in HEP and progression per patient tolerance, in order to prevent re-injury. 2. Patient will have a decrease in pain to facilitate improvement in movement, function, and ADLs as indicated by Functional Deficits.     Long Term Goals: To be achieved in: 12 weeks  1. Disability index score of 35% or less for the [facility] to assist with reaching prior level of function. 2. Patient will demonstrate increased AROM to WNL without pain to allow for proper joint functioning as indicated by patients Functional Deficits.    3. Patient will demonstrate an increase in Strength to 3+//5 to allow for proper functional mobility as indicated by patients Functional Deficits. 4. Patient will return to reaching overhead to fix and dry hair functional activities without increased symptoms or restriction. 5. Patient to be able to open a jar without shld pain and weakness(patient specific functional goal)               Progression Towards Functional goals:  [x] Patient is progressing as expected towards functional goals listed. [] Progression is slowed due to complexities listed. [] Progression has been slowed due to co-morbidities. [] Plan just implemented, too soon to assess goals progression  [] Other:     ASSESSMENT:  Increased PROM noted from initial visit. Poor tolerance to progress AAROM     Treatment/Activity Tolerance:  [x] Patient tolerated treatment well [] Patient limited by fatique  [] Patient limited by pain  [] Patient limited by other medical complications  [] Other:     Prognosis: [x] Good [] Fair  [] Poor    Patient Requires Follow-up: [x] Yes  [] No    PLAN: Cont PT. 1-2x/week.   [x] Continue per plan of care [] Alter current plan (see comments)  [] Plan of care initiated [] Hold pending MD visit [] Discharge    Electronically signed by: Rhea Luico, PT

## 2019-04-05 ENCOUNTER — HOSPITAL ENCOUNTER (OUTPATIENT)
Dept: PHYSICAL THERAPY | Age: 74
Setting detail: THERAPIES SERIES
Discharge: HOME | End: 2019-04-05
Payer: MEDICARE

## 2019-04-05 PROCEDURE — 97112 NEUROMUSCULAR REEDUCATION: CPT | Performed by: PHYSICAL THERAPIST

## 2019-04-05 PROCEDURE — 97110 THERAPEUTIC EXERCISES: CPT | Performed by: PHYSICAL THERAPIST

## 2019-04-05 PROCEDURE — G0283 ELEC STIM OTHER THAN WOUND: HCPCS | Performed by: PHYSICAL THERAPIST

## 2019-04-05 PROCEDURE — 97140 MANUAL THERAPY 1/> REGIONS: CPT | Performed by: PHYSICAL THERAPIST

## 2019-04-05 NOTE — FLOWSHEET NOTE
Intact          Exercises/Interventions:   Therapeutic Ex Sets/rep comments   FAV 4 10 x10 reps HEP   Flex/scap table slides H10 x10 + scap to HEP   Elbow, wrist , hand AROM 10 reps HEP        Ext/Abd isometrics H5 x8 reps    ER with cane H10x 10   HEP     punch with assist and BP with hands clasped  H5 x9 reps BP x5 reps   SL scap retraction H5 2x 10 reps    Pulleys 5 reps Painful        Phase 1 strengthening with UE assist 5 reps + to HEP        Supine 90/90 A/P , M/L H5 x5-10 reps                                  Manual Intervention     PROM R shld/ [address] 1-2 mobs 15'                                  NMR re-education     Patient education, post-op restrictions/precautions 15'                                           Therapeutic Exercise and NMR EXR  [x] (07548) Provided verbal/tactile cueing for activities related to strengthening, flexibility, endurance, ROM  for improvements in scapular, scapulothoracic and UE control with self care, reaching, carrying, lifting, house/yardwork, driving/computer work.    [] (36842) Provided verbal/tactile cueing for activities related to improving balance, coordination, kinesthetic sense, posture, motor skill, proprioception  to assist with  scapular, scapulothoracic and UE control with self care, reaching, carrying, lifting, house/yardwork, driving/computer work. Therapeutic Activities:    [] (99422 or 51956) Provided verbal/tactile cueing for activities related to improving balance, coordination, kinesthetic sense, posture, motor skill, proprioception and motor activation to allow for proper function of scapular, scapulothoracic and UE control with self care, carrying, lifting, driving/computer work.      Home Exercise Program:    [x] (05795) Reviewed/Progressed HEP activities related to strengthening, flexibility, endurance, ROM of scapular, scapulothoracic and UE control with self care, reaching, carrying, lifting, house/yardwork, driving/computer work  [] (09249) by patients Functional Deficits. 3. Patient will demonstrate an increase in Strength to 3+//5 to allow for proper functional mobility as indicated by patients Functional Deficits. 4. Patient will return to reaching overhead to fix and dry hair functional activities without increased symptoms or restriction. 5. Patient to be able to open a jar without shld pain and weakness(patient specific functional goal)               Progression Towards Functional goals:  [x] Patient is progressing as expected towards functional goals listed. [] Progression is slowed due to complexities listed. [] Progression has been slowed due to co-morbidities. [] Plan just implemented, too soon to assess goals progression  [] Other:     ASSESSMENT:  Increased PROM noted from initial visit. Poor tolerance to progress AAROM     Treatment/Activity Tolerance:  [x] Patient tolerated treatment well [] Patient limited by fatique  [] Patient limited by pain  [] Patient limited by other medical complications  [] Other:     Prognosis: [x] Good [] Fair  [] Poor    Patient Requires Follow-up: [x] Yes  [] No    PLAN: Cont PT. 1-2x/week.   [x] Continue per plan of care [] Alter current plan (see comments)  [] Plan of care initiated [] Hold pending MD visit [] Discharge    Electronically signed by: Diana Elizabeth, PT

## 2019-04-09 ENCOUNTER — HOSPITAL ENCOUNTER (OUTPATIENT)
Dept: PHYSICAL THERAPY | Age: 74
Setting detail: THERAPIES SERIES
Discharge: HOME | End: 2019-04-09
Payer: MEDICARE

## 2019-04-09 PROCEDURE — G0283 ELEC STIM OTHER THAN WOUND: HCPCS | Performed by: PHYSICAL THERAPIST

## 2019-04-09 PROCEDURE — 97112 NEUROMUSCULAR REEDUCATION: CPT | Performed by: PHYSICAL THERAPIST

## 2019-04-09 PROCEDURE — 97110 THERAPEUTIC EXERCISES: CPT | Performed by: PHYSICAL THERAPIST

## 2019-04-09 PROCEDURE — 97140 MANUAL THERAPY 1/> REGIONS: CPT | Performed by: PHYSICAL THERAPIST

## 2019-04-09 NOTE — FLOWSHEET NOTE
Reviewed/Progressed HEP activities related to strengthening, flexibility, endurance, ROM of scapular, scapulothoracic and UE control with self care, reaching, carrying, lifting, house/yardwork, driving/computer work  [] (35195) Reviewed/Progressed HEP activities related to improving balance, coordination, kinesthetic sense, posture, motor skill, proprioception of scapular, scapulothoracic and UE control with self care, reaching, carrying, lifting, house/yardwork, driving/computer work      Manual Treatments:  PROM / STM / Oscillations-Mobs:  G-I, II, III, IV (PA's, Inf., Post.)  [x] (89072) Provided manual therapy to mobilize soft tissue/joints of cervical/CT, scapular GHJ and UE for the purpose of modulating pain, promoting relaxation,  increasing ROM, reducing/eliminating soft tissue swelling/inflammation/restriction, improving soft tissue extensibility and allowing for proper ROM for normal function with self care, reaching, carrying, lifting, house/yardwork, driving/computer work    Modalities: ES with CP to R shld x 15 min after treatment    Charges:  Timed Code Treatment Minutes: 45'   Total Treatment Minutes: 61'     [] EVAL (LOW) 92411 (typically 20 minutes face-to-face)  [x] EVAL (MOD) 80253 (typically 30 minutes face-to-face)  [] EVAL (HIGH) 67883 (typically 45 minutes face-to-face)  [] RE-EVAL     [x] SV(67186) x  1   [] IONTO  [x] NMR (18298) x  1   [] VASO  [x] Manual (66371) x  1    [] Other:  [] TA x       [] Mech Traction (37749)  [] ES(attended) (57208)      [x] ES (un) (83922):     GOALS: (cut and paste from eval)  Patient stated goal: Decrease pain     Therapist goals for Patient:   Short Term Goals: To be achieved in: 2 weeks  1. Independent in HEP and progression per patient tolerance, in order to prevent re-injury. 2. Patient will have a decrease in pain to facilitate improvement in movement, function, and ADLs as indicated by Functional Deficits.     Long Term Goals:  To be achieved in: 12 weeks  1. Disability index score of 35% or less for the [facility] to assist with reaching prior level of function. 2. Patient will demonstrate increased AROM to WNL without pain to allow for proper joint functioning as indicated by patients Functional Deficits. 3. Patient will demonstrate an increase in Strength to 3+//5 to allow for proper functional mobility as indicated by patients Functional Deficits. 4. Patient will return to reaching overhead to fix and dry hair functional activities without increased symptoms or restriction. 5. Patient to be able to open a jar without shld pain and weakness(patient specific functional goal)               Progression Towards Functional goals:  [x] Patient is progressing as expected towards functional goals listed. [] Progression is slowed due to complexities listed. [] Progression has been slowed due to co-morbidities. [] Plan just implemented, too soon to assess goals progression  [] Other:     ASSESSMENT:  Increased PROM noted from initial visit. Able to tolerate AAROM better this visit. Treatment/Activity Tolerance:  [x] Patient tolerated treatment well [] Patient limited by fatique  [] Patient limited by pain  [] Patient limited by other medical complications  [] Other:     Prognosis: [x] Good [] Fair  [] Poor    Patient Requires Follow-up: [x] Yes  [] No    PLAN: Cont PT. 1-2x/week.   [x] Continue per plan of care [] Alter current plan (see comments)  [] Plan of care initiated [] Hold pending MD visit [] Discharge    Electronically signed by: Raf Lopez PT

## 2019-04-16 ENCOUNTER — HOSPITAL ENCOUNTER (OUTPATIENT)
Dept: PHYSICAL THERAPY | Age: 74
Setting detail: THERAPIES SERIES
Discharge: HOME | End: 2019-04-16
Payer: MEDICARE

## 2019-04-16 PROCEDURE — 97140 MANUAL THERAPY 1/> REGIONS: CPT | Performed by: PHYSICAL THERAPIST

## 2019-04-16 PROCEDURE — 97110 THERAPEUTIC EXERCISES: CPT | Performed by: PHYSICAL THERAPIST

## 2019-04-16 PROCEDURE — G0283 ELEC STIM OTHER THAN WOUND: HCPCS | Performed by: PHYSICAL THERAPIST

## 2019-04-16 PROCEDURE — 97112 NEUROMUSCULAR REEDUCATION: CPT | Performed by: PHYSICAL THERAPIST

## 2019-04-16 NOTE — FLOWSHEET NOTE
[facility],  [address]  Phone: [phone]  Fax [phone]      Physical Therapy Daily Treatment Note  Date:  2019    Patient Name:  Colonel Mitchell    :  1945  MRN: [id number]  Restrictions/Precautions:    Medical/Treatment Diagnosis Information:  · Diagnosis: M75.121 tear of R Rotator cuff, s/p R RCR, SAD, long head of biceps tenodesis on 19  · Treatment Diagnosis: M75.121 tear of R Rotator cuff, R shld pain M25.511D, s/p R RCR, SAD, long head of biceps tenodesis on 36  Insurance/Certification information:  PT Insurance Information: / [employer]  Physician Information:  Referring Practitioner: Dr. Fantasma Dawson of care signed (Y/N):     Date of Patient follow up with Physician: end of May. G-Code (if applicable):      Date G-Code Applied:         Progress Note: []  Yes  [x]  No  Next due by: Visit #10      Latex Allergy:  [x]NO      []YES  Preferred Language for Healthcare:   [x]English       []other:    Visit # Insurance Allowable Requires auth   6 /AARP    []no        []yes:   9 weeks post-op 4/10/19  Pain level:  3-4/10     SUBJECTIVE:  Reports no issues noted with new exercises. Reports pain level is about the same but is able to do more with R UE with ADL's. Putting a jacket on is getting easier as well as holding a coffee cup. OBJECTIVE: Provided Education in post-operative precautions/contraindications. Observation:   Test measurements:  Flex:146, ER:55-58    RESTRICTIONS/PRECAUTIONS:  Abduction pillow /sling. Massive RCR protocol ( RCR, SAD and bicep tenodesis 19)   Post-op note. 1. Superior Labrum/Biceps Tendon. Extensive tearing of the long head biceps tendon. Intact superior labrum   2. Anterior/Posterior Labrum- Intact without fraying  3. Rotator Cuff- There was a full thickness tear of the supraspinatus tendon and anterior 50% of the infraspinatus tendon.  There was a full thickness tear of the upper 50% of the subscapularis tendon. 4. Inferior Axillary Recess. No loose bodies  5. Articular Surfaces. Intact          Exercises/Interventions:   Therapeutic Ex Sets/rep comments   FAV 4 10 x10 reps HEP   Flex/scap table slides H10 x10 + scap to HEP   Elbow, wrist , hand AROM 10 reps HEP   SB flexion slides/scaption slides     Ext/Abd isometrics H5 x10 reps + to HEP   Er/IR N.V.isometrics H5 x 5 reps + to HEP   ER with cane H10x 10   HEP     punch without assist and BP with hands clasped  H5 x9 reps BP x10 reps   SL scap retraction H5 2x 10 reps    Pulleys 10 reps Painful   Wall slides 5 reps Painful   Phase 1 strengthening with UE assist 10 reps + to HEP   BP with cane into shld flex stretches H10 x10 reps    Supine 90/90 A/P , M/L H5 x2x10 reps         Seated ER stretches h30 x5 reps + to HEP        IR assist with hand to iliac crest H10 x 5 reps Can start at home   TB Rows H5 x 6 reps Painful, so held        Manual Intervention     PROM R shld/ [address] 1-2 mobs 15'                                  NMR re-education     Patient education, post-op restrictions/precautions 15'                                           Therapeutic Exercise and NMR EXR  [x] (85248) Provided verbal/tactile cueing for activities related to strengthening, flexibility, endurance, ROM  for improvements in scapular, scapulothoracic and UE control with self care, reaching, carrying, lifting, house/yardwork, driving/computer work.    [] (57477) Provided verbal/tactile cueing for activities related to improving balance, coordination, kinesthetic sense, posture, motor skill, proprioception  to assist with  scapular, scapulothoracic and UE control with self care, reaching, carrying, lifting, house/yardwork, driving/computer work.     Therapeutic Activities:    [] (10271 or 67126) Provided verbal/tactile cueing for activities related to improving balance, coordination, kinesthetic sense, posture, motor skill, proprioception and motor activation to allow for proper function of scapular, scapulothoracic and UE control with self care, carrying, lifting, driving/computer work. Home Exercise Program:    [x] (70958) Reviewed/Progressed HEP activities related to strengthening, flexibility, endurance, ROM of scapular, scapulothoracic and UE control with self care, reaching, carrying, lifting, house/yardwork, driving/computer work  [] (19185) Reviewed/Progressed HEP activities related to improving balance, coordination, kinesthetic sense, posture, motor skill, proprioception of scapular, scapulothoracic and UE control with self care, reaching, carrying, lifting, house/yardwork, driving/computer work      Manual Treatments:  PROM / STM / Oscillations-Mobs:  G-I, II, III, IV (PA's, Inf., Post.)  [x] (92545) Provided manual therapy to mobilize soft tissue/joints of cervical/CT, scapular GHJ and UE for the purpose of modulating pain, promoting relaxation,  increasing ROM, reducing/eliminating soft tissue swelling/inflammation/restriction, improving soft tissue extensibility and allowing for proper ROM for normal function with self care, reaching, carrying, lifting, house/yardwork, driving/computer work    Modalities: ES with CP to R shld x 15 min after treatment    Charges:  Timed Code Treatment Minutes: 45'   Total Treatment Minutes: 61'     [] EVAL (LOW) 40682 (typically 20 minutes face-to-face)  [x] EVAL (MOD) 01511 (typically 30 minutes face-to-face)  [] EVAL (HIGH) 94937 (typically 45 minutes face-to-face)  [] RE-EVAL     [x] YQ(75545) x  1   [] IONTO  [x] NMR (02700) x  1   [] VASO  [x] Manual (35700) x  1    [] Other:  [] TA x       [] Mech Traction (45423)  [] ES(attended) (37036)      [x] ES (un) (57672):     GOALS: (cut and paste from eval)  Patient stated goal: Decrease pain     Therapist goals for Patient:   Short Term Goals: To be achieved in: 2 weeks  1.  Independent in HEP and progression per patient tolerance, in order to prevent re-injury. 2. Patient will have a decrease in pain to facilitate improvement in movement, function, and ADLs as indicated by Functional Deficits.     Long Term Goals: To be achieved in: 12 weeks  1. Disability index score of 35% or less for the [facility] to assist with reaching prior level of function. 2. Patient will demonstrate increased AROM to WNL without pain to allow for proper joint functioning as indicated by patients Functional Deficits. 3. Patient will demonstrate an increase in Strength to 3+//5 to allow for proper functional mobility as indicated by patients Functional Deficits. 4. Patient will return to reaching overhead to fix and dry hair functional activities without increased symptoms or restriction. 5. Patient to be able to open a jar without shld pain and weakness(patient specific functional goal)               Progression Towards Functional goals:  [x] Patient is progressing as expected towards functional goals listed. [] Progression is slowed due to complexities listed. [] Progression has been slowed due to co-morbidities. [] Plan just implemented, too soon to assess goals progression  [] Other:     ASSESSMENT:  Increased PROM noted from initial visit. Able to tolerate AAROM better this visit. Treatment/Activity Tolerance:  [x] Patient tolerated treatment well [] Patient limited by fatique  [] Patient limited by pain  [] Patient limited by other medical complications  [] Other:     Prognosis: [x] Good [] Fair  [] Poor    Patient Requires Follow-up: [x] Yes  [] No    PLAN: Cont PT. 1-2x/week.   [x] Continue per plan of care [] Alter current plan (see comments)  [] Plan of care initiated [] Hold pending MD visit [] Discharge    Electronically signed by: Connie Gilmore PT

## 2019-04-19 ENCOUNTER — HOSPITAL ENCOUNTER (OUTPATIENT)
Dept: PHYSICAL THERAPY | Age: 74
Setting detail: THERAPIES SERIES
Discharge: HOME | End: 2019-04-19
Payer: MEDICARE

## 2019-04-19 PROCEDURE — 97110 THERAPEUTIC EXERCISES: CPT | Performed by: PHYSICAL THERAPIST

## 2019-04-19 PROCEDURE — G0283 ELEC STIM OTHER THAN WOUND: HCPCS | Performed by: PHYSICAL THERAPIST

## 2019-04-19 PROCEDURE — 97140 MANUAL THERAPY 1/> REGIONS: CPT | Performed by: PHYSICAL THERAPIST

## 2019-04-19 PROCEDURE — 97112 NEUROMUSCULAR REEDUCATION: CPT | Performed by: PHYSICAL THERAPIST

## 2019-04-19 NOTE — FLOWSHEET NOTE
[facility],  [address] [place]  Phone: [phone]  Fax [phone]      Physical Therapy Daily Treatment Note  Date:  2019    Patient Name:  Jesse Lee    :  1945  MRN: [id number]  Restrictions/Precautions:    Medical/Treatment Diagnosis Information:  · Diagnosis: M75.121 tear of R Rotator cuff, s/p R RCR, SAD, long head of biceps tenodesis on 19  · Treatment Diagnosis: M75.121 tear of R Rotator cuff, R shld pain M25.511D, s/p R RCR, SAD, long head of biceps tenodesis on 86  Insurance/Certification information:  PT Insurance Information: / AARP  Physician Information:  Referring Practitioner: Dr. Itzel Last of care signed (Y/N):     Date of Patient follow up with Physician: end of May. G-Code (if applicable):      Date G-Code Applied:         Progress Note: []  Yes  [x]  No  Next due by: Visit #10      Latex Allergy:  [x]NO      []YES  Preferred Language for Healthcare:   [x]English       []other:    Visit # Insurance Allowable Requires auth   7 /AARP    []no        []yes:   9 weeks post-op 4/10/19  Pain level:  3-4/10     Height: 5' 5\", Weight: 135lbs. SUBJECTIVE:  Reports no issues noted with new exercises. Reports waking up with shld more sore today in general. Must have slept on it wrong last night. OBJECTIVE: Provided Education in post-operative precautions/contraindications. Observation:   Test measurements:  Flex:146, ER:55-58. 150 AAROM with cane    RESTRICTIONS/PRECAUTIONS:  Abduction pillow /sling. Massive RCR protocol ( RCR, SAD and bicep tenodesis 19)   Post-op note. 1. Superior Labrum/Biceps Tendon. Extensive tearing of the long head biceps tendon. Intact superior labrum   2. Anterior/Posterior Labrum- Intact without fraying  3. Rotator Cuff- There was a full thickness tear of the supraspinatus tendon and anterior 50% of the infraspinatus tendon.  There was a full thickness tear of the upper 50% of the subscapularis tendon. 4. Inferior Axillary Recess. No loose bodies  5. Articular Surfaces. Intact          Exercises/Interventions:   Therapeutic Ex Sets/rep comments   FAV 4 10 x10 reps HEP   Flex/scap table slides H10 x10 + scap to HEP   Elbow, wrist , hand AROM 10 reps HEP   SB flexion slides/scaption slides     Ext/Abd isometrics H5 x10 reps + to HEP   Er/IR N.V.isometrics ER with cane H10x 10   HEP     punch without assist and BP with hands clasped  H5 2x10 reps BP x10 reps   SL scap retraction H5 2x 10 reps    Pulleys 10 reps Painful   Wall slides 5 reps Painful   Phase 1 strengthening with UE assist 10 reps + to HEP   BP with cane into shld flex stretches 1# H10 x10 reps    Supine 90/90 A/P , M/L 1# H5 x2x10 reps    Seratus anterior 1# 2x10 reps    Seated ER stretches h30 x5 reps + to HEP        IR assist with hand to iliac crest H10 x 5 reps Can start at home   TB Rows H5 x 6 reps Painful, so held   Prone shld ext BW held due to pain    Walkouts ER/IR H5 x 5 reps    Manual Intervention     PROM R shld/ [address] 1-2 mobs 15'                                  NMR re-education     Patient education, post-op restrictions/precautions 15'                                           Therapeutic Exercise and NMR EXR  [x] (14811) Provided verbal/tactile cueing for activities related to strengthening, flexibility, endurance, ROM  for improvements in scapular, scapulothoracic and UE control with self care, reaching, carrying, lifting, house/yardwork, driving/computer work.    [] (68430) Provided verbal/tactile cueing for activities related to improving balance, coordination, kinesthetic sense, posture, motor skill, proprioception  to assist with  scapular, scapulothoracic and UE control with self care, reaching, carrying, lifting, house/yardwork, driving/computer work.     Therapeutic Activities:    [] (30767 or ) Provided verbal/tactile cueing for activities related to 2 weeks  1. Independent in HEP and progression per patient tolerance, in order to prevent re-injury. 2. Patient will have a decrease in pain to facilitate improvement in movement, function, and ADLs as indicated by Functional Deficits.     Long Term Goals: To be achieved in: 12 weeks  1. Disability index score of 35% or less for the [facility] to assist with reaching prior level of function. 2. Patient will demonstrate increased AROM to WNL without pain to allow for proper joint functioning as indicated by patients Functional Deficits. 3. Patient will demonstrate an increase in Strength to 3+//5 to allow for proper functional mobility as indicated by patients Functional Deficits. 4. Patient will return to reaching overhead to fix and dry hair functional activities without increased symptoms or restriction. 5. Patient to be able to open a jar without shld pain and weakness(patient specific functional goal)               Progression Towards Functional goals:  [x] Patient is progressing as expected towards functional goals listed. [] Progression is slowed due to complexities listed. [] Progression has been slowed due to co-morbidities. [] Plan just implemented, too soon to assess goals progression  [] Other:     ASSESSMENT:  Increased PROM noted from initial visit. Able to tolerate AAROM better this visit. Pain still noted with Rows or extension ROM R shld. Cont to assess. Treatment/Activity Tolerance:  [x] Patient tolerated treatment well [] Patient limited by fatique  [] Patient limited by pain  [] Patient limited by other medical complications  [] Other:     Prognosis: [x] Good [] Fair  [] Poor    Patient Requires Follow-up: [x] Yes  [] No    PLAN: Cont PT. 1-2x/week.   [x] Continue per plan of care [] Alter current plan (see comments)  [] Plan of care initiated [] Hold pending MD visit [] Discharge    Electronically signed by: Cristian Angel, PT

## 2019-04-23 ENCOUNTER — HOSPITAL ENCOUNTER (OUTPATIENT)
Dept: PHYSICAL THERAPY | Age: 74
Setting detail: THERAPIES SERIES
Discharge: HOME | End: 2019-04-23
Payer: MEDICARE

## 2019-04-23 PROCEDURE — 97140 MANUAL THERAPY 1/> REGIONS: CPT | Performed by: PHYSICAL THERAPIST

## 2019-04-23 PROCEDURE — G0283 ELEC STIM OTHER THAN WOUND: HCPCS | Performed by: PHYSICAL THERAPIST

## 2019-04-23 PROCEDURE — 97112 NEUROMUSCULAR REEDUCATION: CPT | Performed by: PHYSICAL THERAPIST

## 2019-04-23 PROCEDURE — 97110 THERAPEUTIC EXERCISES: CPT | Performed by: PHYSICAL THERAPIST

## 2019-04-23 NOTE — FLOWSHEET NOTE
[facility],  [address] [place]  Phone: [phone]  Fax [phone]      Physical Therapy Daily Treatment Note  Date:  2019    Patient Name:  Patricia Elkins    :  1945  MRN: [id number]  Restrictions/Precautions:    Medical/Treatment Diagnosis Information:  · Diagnosis: M75.121 tear of R Rotator cuff, s/p R RCR, SAD, long head of biceps tenodesis on 19  · Treatment Diagnosis: M75.121 tear of R Rotator cuff, R shld pain M25.511D, s/p R RCR, SAD, long head of biceps tenodesis on 44  Insurance/Certification information:  PT Insurance Information: MC/ AARP  Physician Information:  Referring Practitioner: Dr. John Mc of care signed (Y/N):     Date of Patient follow up with Physician: end of May. G-Code (if applicable):      Date G-Code Applied:         Progress Note: []  Yes  [x]  No  Next due by: Visit #10      Latex Allergy:  [x]NO      []YES  Preferred Language for Healthcare:   [x]English       []other:    Visit # Insurance Allowable Requires auth   8 MC/AARP    []no        []yes:   9 weeks post-op 4/10/19  Pain level:  4-510     Height: 5' 5\", Weight: 135lbs. SUBJECTIVE:  Reports no issues from last visit, just soreness and fatigue in general. Reports on last Friday trying a lot of shirts on at [address] [place] and had increased pain during and after. Pain level went up to a 4-5/10 last several days. In general , also using UE more with ADL's    OBJECTIVE: Provided Education in post-operative precautions/contraindications. Flex with cane 145 this visit. Observation:   Test measurements:  Flex:146, ER:55-58. 150 AAROM with cane    RESTRICTIONS/PRECAUTIONS:  Abduction pillow /sling. Massive RCR protocol ( RCR, SAD and bicep tenodesis 19)   Post-op note. 1. Superior Labrum/Biceps Tendon. Extensive tearing of the long head biceps tendon. Intact superior labrum   2.  Anterior/Posterior Labrum- house/yardwork, driving/computer work. Therapeutic Activities:    [] (25453 or 92518) Provided verbal/tactile cueing for activities related to improving balance, coordination, kinesthetic sense, posture, motor skill, proprioception and motor activation to allow for proper function of scapular, scapulothoracic and UE control with self care, carrying, lifting, driving/computer work.      Home Exercise Program:    [x] (38907) Reviewed/Progressed HEP activities related to strengthening, flexibility, endurance, ROM of scapular, scapulothoracic and UE control with self care, reaching, carrying, lifting, house/yardwork, driving/computer work  [] (34737) Reviewed/Progressed HEP activities related to improving balance, coordination, kinesthetic sense, posture, motor skill, proprioception of scapular, scapulothoracic and UE control with self care, reaching, carrying, lifting, house/yardwork, driving/computer work      Manual Treatments:  PROM / STM / Oscillations-Mobs:  G-I, II, III, IV (PA's, Inf., Post.)  [x] (58588) Provided manual therapy to mobilize soft tissue/joints of cervical/CT, scapular GHJ and UE for the purpose of modulating pain, promoting relaxation,  increasing ROM, reducing/eliminating soft tissue swelling/inflammation/restriction, improving soft tissue extensibility and allowing for proper ROM for normal function with self care, reaching, carrying, lifting, house/yardwork, driving/computer work    Modalities: ES with CP to R shld x 15 min after treatment    Charges:  Timed Code Treatment Minutes: 50'   Total Treatment Minutes: 72'     [] EVAL (LOW) 67319 (typically 20 minutes face-to-face)  [x] EVAL (MOD) 69403 (typically 30 minutes face-to-face)  [] EVAL (HIGH) 90686 (typically 45 minutes face-to-face)  [] RE-EVAL     [x] VQ(50455) x  1   [] IONTO  [x] NMR (88963) x  1   [] VASO  [x] Manual (61229) x  1    [] Other:  [] TA x       [] Mech Traction (47962)  [] ES(attended) (38903)      [x] ES (un) (34634): initiated [] Hold pending MD visit [] Discharge    Electronically signed by: Ren Martinez PT

## 2019-04-26 ENCOUNTER — HOSPITAL ENCOUNTER (OUTPATIENT)
Dept: PHYSICAL THERAPY | Age: 74
Setting detail: THERAPIES SERIES
Discharge: HOME | End: 2019-04-26
Payer: MEDICARE

## 2019-04-26 PROCEDURE — G0283 ELEC STIM OTHER THAN WOUND: HCPCS | Performed by: PHYSICAL THERAPIST

## 2019-04-26 PROCEDURE — 97110 THERAPEUTIC EXERCISES: CPT | Performed by: PHYSICAL THERAPIST

## 2019-04-26 PROCEDURE — 97140 MANUAL THERAPY 1/> REGIONS: CPT | Performed by: PHYSICAL THERAPIST

## 2019-04-26 PROCEDURE — 97112 NEUROMUSCULAR REEDUCATION: CPT | Performed by: PHYSICAL THERAPIST

## 2019-04-26 NOTE — FLOWSHEET NOTE
[facility],  [address]  Phone: [phone]  Fax [phone]      Physical Therapy Daily Treatment Note  Date:  2019    Patient Name:  Eligio Schaumann  \" Anjali\"  :  1945  MRN: [id number]  Restrictions/Precautions:    Medical/Treatment Diagnosis Information:  · Diagnosis: M75.121 tear of R Rotator cuff, s/p R RCR, SAD, long head of biceps tenodesis on 19  · Treatment Diagnosis: M75.121 tear of R Rotator cuff, R shld pain M25.511D, s/p R RCR, SAD, long head of biceps tenodesis on   Insurance/Certification information:  PT Insurance Information: MC/ AARP  Physician Information:  Referring Practitioner: Dr. Padmaja Alegre of care signed (Y/N):     Date of Patient follow up with Physician: end of May. G-Code (if applicable):      Date G-Code Applied:         Progress Note: []  Yes  [x]  No  Next due by: Visit #10      Latex Allergy:  [x]NO      []YES  Preferred Language for Healthcare:   [x]English       []other:    Visit # Insurance Allowable Requires auth   9 MC/AARP    []no        []yes:   12 weeks post-op 19  Pain level:  4-510     Height: 5' 5\", Weight: 135lbs. SUBJECTIVE:  Reports no increased soreness after last visit, but still sore from episode at [address] [place] with trying on a lot of shirts. Wears sling at night when sleeping to protect it    OBJECTIVE:10 visit note N.WILMA Schreiber to actively raise UE ~ 90 for shld flex, ~60-65 for abd. Provided Education in post-operative precautions/contraindications. Flex with cane 145 this visit. Observation:   Test measurements: On  Flex:150, ER:70. 155 flex AAROM with cane. RESTRICTIONS/PRECAUTIONS:  Abduction pillow /sling. Massive RCR protocol ( RCR, SAD and bicep tenodesis 19)   Post-op note. 1. Superior Labrum/Biceps Tendon. Extensive tearing of the long head biceps tendon. Intact superior labrum   2.  Anterior/Posterior Labrum- Intact without fraying  3. Rotator Cuff- There was a full thickness tear of the supraspinatus tendon and anterior 50% of the infraspinatus tendon. There was a full thickness tear of the upper 50% of the subscapularis tendon. 4. Inferior Axillary Recess. No loose bodies  5. Articular Surfaces. Intact          Exercises/Interventions: Held strengthening exercises this visit due to increase soreness today.   Therapeutic Ex Sets/rep comments   FAV 4 10 x10 reps HEP   Flex/scap table slides H10 x10 + scap to HEP   Elbow, wrist , hand AROM 10 reps HEP   SB flexion slides/scaption slides     Ext/Abd isometrics  + to HEP   Er/IR N.V.isometrics ER with cane H10x 10   HEP     punch without assist and BP with hands clasped  H5 2x10 reps BP x10 reps   SL abd with elbow bent or straight Too painful so held    SL scap retraction H5 2x 10 reps    Pulleys 20 reps Painful   Wall slides 10 repsPhase 1 strengthening with UE assist 10 reps + to HEP   BP with cane into shld flex stretches 1# H10 x10 reps    Supine 90/90 A/P , M/L    Seratus anterior 1# 2x10 reps   Seated ER stretches/Supine ER h30 x5 reps + to HEP        IR assist with hand to iliac crest  Can start at home   TB Rows H5 x 10 reps Minimal to no discomfort   Prone shld ext BW only x 7 repsWalkouts ER/IR H5 x 6 repsEasily fatiguedManual Intervention     PROM R [facility] 1-2 mobs 15'                                  NMR re-education     Patient education, post-op restrictions/precautions 15'                                           Therapeutic Exercise and NMR EXR  [x] (09533) Provided verbal/tactile cueing for activities related to strengthening, flexibility, endurance, ROM  for improvements in scapular, scapulothoracic and UE control with self care, reaching, carrying, lifting, house/yardwork, driving/computer work.    [] (01307) Provided verbal/tactile cueing for activities related to improving balance, coordination, kinesthetic sense, posture, motor skill, proprioception  to assist with  scapular, scapulothoracic and UE control with self care, reaching, carrying, lifting, house/yardwork, driving/computer work. Therapeutic Activities:    [] (52423 or 72087) Provided verbal/tactile cueing for activities related to improving balance, coordination, kinesthetic sense, posture, motor skill, proprioception and motor activation to allow for proper function of scapular, scapulothoracic and UE control with self care, carrying, lifting, driving/computer work.      Home Exercise Program:    [x] (18807) Reviewed/Progressed HEP activities related to strengthening, flexibility, endurance, ROM of scapular, scapulothoracic and UE control with self care, reaching, carrying, lifting, house/yardwork, driving/computer work  [] (97200) Reviewed/Progressed HEP activities related to improving balance, coordination, kinesthetic sense, posture, motor skill, proprioception of scapular, scapulothoracic and UE control with self care, reaching, carrying, lifting, house/yardwork, driving/computer work      Manual Treatments:  PROM / STM / Oscillations-Mobs:  G-I, II, III, IV (PA's, Inf., Post.)  [x] (57568) Provided manual therapy to mobilize soft tissue/joints of cervical/CT, scapular GHJ and UE for the purpose of modulating pain, promoting relaxation,  increasing ROM, reducing/eliminating soft tissue swelling/inflammation/restriction, improving soft tissue extensibility and allowing for proper ROM for normal function with self care, reaching, carrying, lifting, house/yardwork, driving/computer work    Modalities: ES with CP to R shld x 15 min after treatment    Charges:  Timed Code Treatment Minutes: 50'   Total Treatment Minutes: 72'     [] EVAL (LOW) 10964 (typically 20 minutes face-to-face)  [x] EVAL (MOD) 44870 (typically 30 minutes face-to-face)  [] EVAL (HIGH) 67551 (typically 45 minutes face-to-face)  [] RE-EVAL     [x] DS(32519) x  1   [] IONTO  [x] NMR (06987) x  1   [] VASO  [x] 1-2x/week. Progress as tolerated.   [x] Continue per plan of care [] Alter current plan (see comments)  [] Plan of care initiated [] Hold pending MD visit [] Discharge    Electronically signed by: Royal Bob PT

## 2019-04-30 ENCOUNTER — HOSPITAL ENCOUNTER (OUTPATIENT)
Dept: PHYSICAL THERAPY | Age: 74
Setting detail: THERAPIES SERIES
Discharge: HOME | End: 2019-04-30
Payer: MEDICARE

## 2019-04-30 PROCEDURE — 97164 PT RE-EVAL EST PLAN CARE: CPT | Performed by: PHYSICAL THERAPIST

## 2019-04-30 PROCEDURE — 97110 THERAPEUTIC EXERCISES: CPT | Performed by: PHYSICAL THERAPIST

## 2019-04-30 PROCEDURE — G0283 ELEC STIM OTHER THAN WOUND: HCPCS | Performed by: PHYSICAL THERAPIST

## 2019-04-30 PROCEDURE — 97140 MANUAL THERAPY 1/> REGIONS: CPT | Performed by: PHYSICAL THERAPIST

## 2019-04-30 NOTE — PLAN OF CARE
[facility], [address] [place] [address] [place]  Phone: [phone]  Fax [phone]    Physical Therapy Recertification  Date: 2019        Patient Name:  Gregor Lee    :  1945  MRN: [id number]  Referring Physician: Jet Anne  Diagnosis: L rotator cuff tear                      ICD Code: M77.360  Therapy Diagnosis/Practice Pattern: L shoulder pain s/p RCR/ SAD/ long head biceps tenodesis      Total number of visits: 10   Reporting Period:   Beginning Date:2018   End AXXB:947    OBJECTIVE  Test used Initial score Current Score   Pain Summary 0-10 2-8 2-4   Functional questionnaire Quick DASH 64% 41%   Functional Testing            ROM Passive flex 133 150    Active flex   90   Strength Flex  >3 per AROM              Test/tests used to determine % limitation: Q DASH  Actual Score used to drive % limitation: 29    Treatment to date:  [x] Therapeutic Exercise    [] Modalities:  [] Therapeutic Activity             []Ultrasound            [x]Electrical Stimulation  [] Gait Training     []Cervical Traction    [] Lumbar Traction  [x] Neuromuscular Re-education [x] Cold/hotpack         []Iontophoresis  [x] Instruction in HEP      Other:  [x] Manual Therapy                   []                       ?           []   Assessment:  [] Improvement noted relative to goals:  [x] No Improvement noted related to goals:/Patient's response to treatment/Additional assessment: Pt perceives 50% improvement in overall condition. Notes improvement in ROM/ strength overall, with decreasing pain levels. New or Updated Goals (if applicable):  [x] No change to goals established upon initial eval/last progress note:  New Goals:    Rehab Potential:   []Excellent   [x] Good   [] Fair   [] Poor    Plan of Care:  [x] Continue Therapy    Frequency/Duration:  # Days per week: [] 1 day # Weeks: [] 1 week [] 7 weeks     [x] 2 days?    [] 2 weeks [] 8 weeks     [] 3 days   [] 3 weeks [] 9 weeks     [] 4 days   [] 4 weeks [] 10 weeks      [] 5 days   [x] 5 weeks [] 11 weeks          [] 6 weeks [] 12 weeks    Interventions:  [x] Therapeutic Exercise    [] Modalities:  [] Therapeutic Activity    [] Ultrasound  [x] Electrical Stimulation  [] Gait Training     [] Cervical Traction [] Lumbar Traction  [x] Neuromuscular Re-education [x] Cold/hotpack [] Iontophoresis   [x] Instruction in HEP     Other:  [x] Manual Therapy      []                     []           ?       Electronically signed by:  Keith Del Cid, PT  [id number]  If you have any questions or concerns, please don't hesitate to call. Thank you for the referral.  Medicare requires recertification of the therapy plan of care. Additional visits are being requested. Please recertify for additional visits:    Physician Signature:____________________________________Date:_______________  By signing above, therapist's plan is approved by the physician.

## 2019-04-30 NOTE — FLOWSHEET NOTE
[facility],  [address]  Phone: [phone]  Fax [phone]      Physical Therapy Daily Treatment Note  Date:  2019    Patient Name:  Caden Lee  \" Anjali\"  :  1945  MRN: [id number]  Restrictions/Precautions:    Medical/Treatment Diagnosis Information:  · Diagnosis: M75.121 tear of R Rotator cuff, s/p R RCR, SAD, long head of biceps tenodesis on 19  · Treatment Diagnosis: M75.121 tear of R Rotator cuff, R shld pain M25.511D, s/p R RCR, SAD, long head of biceps tenodesis on 43  Insurance/Certification information:  PT Insurance Information: MC/ AARP  Physician Information:  Referring Practitioner: Dr. Scott Browne of care signed (Y/N):     Date of Patient follow up with Physician: end of May. G-Code (if applicable):      Date G-Code Applied:         Progress Note: []  Yes  [x]  No  Next due by: Visit #10      Latex Allergy:  [x]NO      []YES  Preferred Language for Healthcare:   [x]English       []other:    Visit # Insurance Allowable Requires auth   10 MC/AARP    []no        []yes:   12 weeks post-op 19  Pain level:  4-510     Height: 5' 5\", Weight: 135lbs. SUBJECTIVE:  Reports slight decrease in soreness with stretching exercises and wearing sling at night. Also finds ice to be helpful and taking ibuprofen. See recert    [id number]:05 visit note N.V. Peg Asper to actively raise UE ~ 90 for shld flex, ~60-65 for abd. Provided Education in post-operative precautions/contraindications. Flex with cane 145 this visit. Observation:   Test measurements: On  Flex:150, ER:70. 155 flex AAROM with cane. RESTRICTIONS/PRECAUTIONS:  Abduction pillow /sling. Massive RCR protocol ( RCR, SAD and bicep tenodesis 19)   Post-op note. 1. Superior Labrum/Biceps Tendon. Extensive tearing of the long head biceps tendon. Intact superior labrum   2.  Anterior/Posterior Labrum- Intact without fraying  3. Rotator Cuff- There was a full thickness tear of the supraspinatus tendon and anterior 50% of the infraspinatus tendon. There was a full thickness tear of the upper 50% of the subscapularis tendon. 4. Inferior Axillary Recess. No loose bodies  5. Articular Surfaces. Intact          Exercises/Interventions: Held strengthening exercises this visit due to increase soreness today.   Therapeutic Ex Sets/rep comments   FAV 4 10 x10 reps HEP   Flex/scap table slides H10 x10 + scap to HEP        SB flexion slides/scaption slides     Ext/Abd isometrics  + to HEP   Er/IR N.V.isometrics ER with cane H10x 10   HEP     punch without assist and BP with hands clasped  H5 2x10 reps BP x10 reps   SL abd with elbow bent or straight Too painful so held    SL scap retraction H5 2x 10 reps    Pulleys 20 reps Painful   Wall slides- self A 10 repsPhase 1 strengthening with UE assist 10 reps + to HEP   BP with cane into shld flex stretches 1# H10 x10 reps    Supine 90/90 A/P , M/L    Seratus anterior BW 2x10 reps   + to HEP        IR assist with hand to iliac crest  Can start at home   TB Rows (RTB) H5 2 x 10 reps Minimal to no discomfort   Prone shld ext BW only 2 x 10 repsVCWalkouts ER/IR H5 x 6 repsEasily fatiguedManual Intervention     PROM R shld/ GH 1-2 mobs  Scope hole massage 15'                                  NMR re-education     Patient education, post-op restrictions/precautions 15'                                           Therapeutic Exercise and NMR EXR  [x] (38125) Provided verbal/tactile cueing for activities related to strengthening, flexibility, endurance, ROM  for improvements in scapular, scapulothoracic and UE control with self care, reaching, carrying, lifting, house/yardwork, driving/computer work.    [] (71631) Provided verbal/tactile cueing for activities related to improving balance, coordination, kinesthetic sense, posture, motor skill, proprioception  to assist with  scapular, scapulothoracic and UE control with self care, reaching, carrying, lifting, house/yardwork, driving/computer work. Therapeutic Activities:    [] (11343 or 69219) Provided verbal/tactile cueing for activities related to improving balance, coordination, kinesthetic sense, posture, motor skill, proprioception and motor activation to allow for proper function of scapular, scapulothoracic and UE control with self care, carrying, lifting, driving/computer work.      Home Exercise Program:    [x] (96392) Reviewed/Progressed HEP activities related to strengthening, flexibility, endurance, ROM of scapular, scapulothoracic and UE control with self care, reaching, carrying, lifting, house/yardwork, driving/computer work  [] (79198) Reviewed/Progressed HEP activities related to improving balance, coordination, kinesthetic sense, posture, motor skill, proprioception of scapular, scapulothoracic and UE control with self care, reaching, carrying, lifting, house/yardwork, driving/computer work      Manual Treatments:  PROM / STM / Oscillations-Mobs:  G-I, II, III, IV (PA's, Inf., Post.)  [x] (19584) Provided manual therapy to mobilize soft tissue/joints of cervical/CT, scapular GHJ and UE for the purpose of modulating pain, promoting relaxation,  increasing ROM, reducing/eliminating soft tissue swelling/inflammation/restriction, improving soft tissue extensibility and allowing for proper ROM for normal function with self care, reaching, carrying, lifting, house/yardwork, driving/computer work    Modalities: ES with CP to R shld x 15 min after treatment    Charges:  Timed Code Treatment Minutes: 35'   Total Treatment Minutes: 72' (modality)     [] EVAL (LOW) 04263 (typically 20 minutes face-to-face)  [x] EVAL (MOD) 05143 (typically 30 minutes face-to-face)  [] EVAL (HIGH) 56002 (typically 45 minutes face-to-face)  [x] RE-EVAL     [x] IG(04900) x  1   [] IONTO  [x] NMR (09992) x      [] VASO  [x] Manual (70037) x  1    [] Other:  [] TA x       [] [facility] Traction (11252)  [] ES(attended) (54908)      [x] ES (un) (08402):     GOALS: (cut and paste from eval)  Patient stated goal: Decrease pain     Therapist goals for Patient:   Short Term Goals: To be achieved in: 2 weeks  1. Independent in HEP and progression per patient tolerance, in order to prevent re-injury. 2. Patient will have a decrease in pain to facilitate improvement in movement, function, and ADLs as indicated by Functional Deficits.     Long Term Goals: To be achieved in: 12 weeks  1. Disability index score of 35% or less for the [facility] to assist with reaching prior level of function. 2. Patient will demonstrate increased AROM to WNL without pain to allow for proper joint functioning as indicated by patients Functional Deficits. 3. Patient will demonstrate an increase in Strength to 3+//5 to allow for proper functional mobility as indicated by patients Functional Deficits. 4. Patient will return to reaching overhead to fix and dry hair functional activities without increased symptoms or restriction. 5. Patient to be able to open a jar without shld pain and weakness(patient specific functional goal)               Progression Towards Functional goals:  [x] Patient is progressing as expected towards functional goals listed. [] Progression is slowed due to complexities listed. [] Progression has been slowed due to co-morbidities. [] Plan just implemented, too soon to assess goals progression  [] Other:     ASSESSMENT: see recert    Treatment/Activity Tolerance:  [x] Patient tolerated treatment well [] Patient limited by fatique  [] Patient limited by pain  [] Patient limited by other medical complications  [] Other:     Prognosis: [x] Good [] Fair  [] Poor    Patient Requires Follow-up: [x] Yes  [] No    PLAN: Cont PT. 1-2x/week. Progress as tolerated.   [x] Continue per plan of care [] Alter current plan (see comments)  [] Plan of care initiated [] Hold pending MD visit [] Discharge    Electronically signed by: Jami Marcial

## 2019-05-03 ENCOUNTER — HOSPITAL ENCOUNTER (OUTPATIENT)
Dept: PHYSICAL THERAPY | Age: 74
Setting detail: THERAPIES SERIES
Discharge: HOME | End: 2019-05-03
Payer: MEDICARE

## 2019-05-03 PROCEDURE — 97110 THERAPEUTIC EXERCISES: CPT | Performed by: PHYSICAL THERAPIST

## 2019-05-03 PROCEDURE — G0283 ELEC STIM OTHER THAN WOUND: HCPCS | Performed by: PHYSICAL THERAPIST

## 2019-05-03 PROCEDURE — 97112 NEUROMUSCULAR REEDUCATION: CPT | Performed by: PHYSICAL THERAPIST

## 2019-05-03 PROCEDURE — 97140 MANUAL THERAPY 1/> REGIONS: CPT | Performed by: PHYSICAL THERAPIST

## 2019-05-03 NOTE — FLOWSHEET NOTE
[facility],  [address] [place]  Phone: [phone]  Fax [phone]      Physical Therapy Daily Treatment Note  Date:  5/3/2019    Patient Name:  Lydia Lopez  \" Anjali\"  :  1945  MRN: [id number]  Restrictions/Precautions:    Medical/Treatment Diagnosis Information:  · Diagnosis: M75.121 tear of R Rotator cuff, s/p R RCR, SAD, long head of biceps tenodesis on 19  · Treatment Diagnosis: M75.121 tear of R Rotator cuff, R shld pain M25.511D, s/p R RCR, SAD, long head of biceps tenodesis on 17  Insurance/Certification information:  PT Insurance Information: MC/ AARP  Physician Information:  Referring Practitioner: Dr. Danish Ashby of care signed (Y/N):     Date of Patient follow up with Physician: end of May. G-Code (if applicable):      Date G-Code Applied:         Progress Note: []  Yes  [x]  No  Next due by: Visit #10      Latex Allergy:  [x]NO      []YES  Preferred Language for Healthcare:   [x]English       []other:    Visit # Insurance Allowable Requires auth   11 MC/AARP    []no        []yes:   12 weeks post-op 19  Pain level:  4-510     Height: 5' 5\", Weight: 135lbs. SUBJECTIVE:  Reports resting pain still noted since flare-up with trying on a lot of shirts a few weeks ago. OBJECTIVE: Able to actively raise UE ~ 90 for shld flex, ~60-65 for abd. Provided Education in post-operative precautions/contraindications. Flex with cane 145 this visit. Observation:   Test measurements: On  Flex:150, ER:70. 155 flex AAROM with cane. RESTRICTIONS/PRECAUTIONS:  Abduction pillow /sling. Massive RCR protocol ( RCR, SAD and bicep tenodesis 19)   Post-op note. 1. Superior Labrum/Biceps Tendon. Extensive tearing of the long head biceps tendon. Intact superior labrum   2. Anterior/Posterior Labrum- Intact without fraying  3.  Rotator Cuff- There was a full thickness tear of the supraspinatus tendon and anterior 50% of the infraspinatus tendon. There was a full thickness tear of the upper 50% of the subscapularis tendon. 4. Inferior Axillary Recess. No loose bodies  5. Articular Surfaces. Intact          Exercises/Interventions: Held strengthening exercises this visit due to increase soreness today.   Therapeutic Ex Sets/rep comments   FAV 4 10 x10 reps HEP   Flex/scap table slides H10 x10 + scap to HEP        SB flexion slides/scaption slides     Ext/Abd isometrics  + to HEP   Er/IR N.V.isometrics ER with cane   HEP     punch without assist and BP with hands clasped  H5 2x10 reps BP x10 reps   SL abd with elbow bent or straight x3 reps each SL eccentrics at 90 /RST   SL scap retraction H5 2x 10 reps    Pulleys 20 reps    Wall slides- self A 10 repsPhase 1 strengthening with UE assist 10 reps HEP   BP with cane into shld flex stretches 1# H10 x10 reps    Supine 90/90 A/P , M/L    Serratus anterior BW 2x10 reps   Supine ERh30 x5 reps HEP   No money YTB H5 1x10 + to HEP   IR assist with hand to iliac crest H10 x 5 reps    TB Rows (RTB) H5 1 x 10 reps + to HEP   Prone shld ext BW only 1x 10 repsWalkouts ER/IR H5 x 10 reps+ to HEPManual Intervention     PROM R shld/ GH 1-2 mobs  Scope hole massage 15'                                  NMR re-education     Patient education, post-op restrictions/precautions 15'                                           Therapeutic Exercise and NMR EXR  [x] (26273) Provided verbal/tactile cueing for activities related to strengthening, flexibility, endurance, ROM  for improvements in scapular, scapulothoracic and UE control with self care, reaching, carrying, lifting, house/yardwork, driving/computer work.    [] (08477) Provided verbal/tactile cueing for activities related to improving balance, coordination, kinesthetic sense, posture, motor skill, proprioception  to assist with  scapular, scapulothoracic and UE control with self care, reaching, carrying, [x] ES (un) (01076):     GOALS: (cut and paste from eval)  Patient stated goal: Decrease pain     Therapist goals for Patient:   Short Term Goals: To be achieved in: 2 weeks  1. Independent in HEP and progression per patient tolerance, in order to prevent re-injury. 2. Patient will have a decrease in pain to facilitate improvement in movement, function, and ADLs as indicated by Functional Deficits.     Long Term Goals: To be achieved in: 12 weeks  1. Disability index score of 35% or less for the [facility] to assist with reaching prior level of function. 2. Patient will demonstrate increased AROM to WNL without pain to allow for proper joint functioning as indicated by patients Functional Deficits. 3. Patient will demonstrate an increase in Strength to 3+//5 to allow for proper functional mobility as indicated by patients Functional Deficits. 4. Patient will return to reaching overhead to fix and dry hair functional activities without increased symptoms or restriction. 5. Patient to be able to open a jar without shld pain and weakness(patient specific functional goal)               Progression Towards Functional goals:  [x] Patient is progressing as expected towards functional goals listed. [] Progression is slowed due to complexities listed. [] Progression has been slowed due to co-morbidities. [] Plan just implemented, too soon to assess goals progression  [] Other:     ASSESSMENT: Able to tolerated strengthening exercises with less pain this visit. PROM improving. Progresssed HEP with TB this visit. Treatment/Activity Tolerance:  [x] Patient tolerated treatment well [] Patient limited by fatique  [] Patient limited by pain  [] Patient limited by other medical complications  [] Other:     Prognosis: [x] Good [] Fair  [] Poor    Patient Requires Follow-up: [x] Yes  [] No    PLAN: Cont PT. 1-2x/week. Progress as tolerated.   [x] Continue per plan of care [] Alter current plan (see comments)  [] Plan of care initiated [] Hold pending MD visit [] Discharge    Electronically signed by: Tito Irby, [address]

## 2019-05-07 ENCOUNTER — HOSPITAL ENCOUNTER (OUTPATIENT)
Dept: PHYSICAL THERAPY | Age: 74
Setting detail: THERAPIES SERIES
Discharge: HOME | End: 2019-05-07
Payer: MEDICARE

## 2019-05-07 PROCEDURE — G0283 ELEC STIM OTHER THAN WOUND: HCPCS | Performed by: PHYSICAL THERAPIST

## 2019-05-07 PROCEDURE — 97110 THERAPEUTIC EXERCISES: CPT | Performed by: PHYSICAL THERAPIST

## 2019-05-07 PROCEDURE — 97140 MANUAL THERAPY 1/> REGIONS: CPT | Performed by: PHYSICAL THERAPIST

## 2019-05-07 PROCEDURE — 97112 NEUROMUSCULAR REEDUCATION: CPT | Performed by: PHYSICAL THERAPIST

## 2019-05-07 NOTE — FLOWSHEET NOTE
[facility],  [address] [place]  Phone: [phone]  Fax [phone]      Physical Therapy Daily Treatment Note  Date:  2019    Patient Name:  Juany Lovell  \" Anjali\"  :  1945  MRN: [id number]  Restrictions/Precautions:    Medical/Treatment Diagnosis Information:  · Diagnosis: M75.121 tear of R Rotator cuff, s/p R RCR, SAD, long head of biceps tenodesis on 19  · Treatment Diagnosis: M75.121 tear of R Rotator cuff, R shld pain M25.511D, s/p R RCR, SAD, long head of biceps tenodesis on 6/3/41  Insurance/Certification information:  PT Insurance Information: MC/ AARP  Physician Information:  Referring Practitioner: Dr. Meeta Metcalf of care signed (Y/N):     Date of Patient follow up with Physician: end of May. G-Code (if applicable):      Date G-Code Applied:         Progress Note: []  Yes  [x]  No  Next due by: Visit #10      Latex Allergy:  [x]NO      []YES  Preferred Language for Healthcare:   [x]English       []other:    Visit # Insurance Allowable Requires auth   12 MC/AARP    []no        []yes:   12 weeks post-op 19  Pain level:  2-3/10     Height: 5' 5\", Weight: 135lbs. SUBJECTIVE:  Reports resting pain still noted since flare-up with trying on a lot of shirts a few weeks ago. OBJECTIVE: Able to actively raise UE ~ 90 for shld flex, ~70 for abd. Progressed HEP this visit. Provided Education in post-operative precautions/contraindications. Flex with cane 145 this visit. Observation:   Test measurements: On  Flex:150, ER:70. 155 flex AAROM with cane. PROM Flex: 160     RESTRICTIONS/PRECAUTIONS:  Abduction pillow /sling. Massive RCR protocol ( RCR, SAD and bicep tenodesis 19)   Post-op note. 1. Superior Labrum/Biceps Tendon. Extensive tearing of the long head biceps tendon. Intact superior labrum   2. Anterior/Posterior Labrum- Intact without fraying  3.  Rotator Cuff- There was a full thickness tear of the supraspinatus tendon and anterior 50% of the infraspinatus tendon. There was a full thickness tear of the upper 50% of the subscapularis tendon. 4. Inferior Axillary Recess. No loose bodies  5. Articular Surfaces. Intact          Exercises/Interventions: Held strengthening exercises this visit due to increase soreness today. Therapeutic Ex Sets/rep comments   FAV 4 10 x10 reps HEP   Flex/scap table slides H10 x10 + scap to HEP        SB flexion slides/scaption slides     Ext/Abd isometrics  + to HEP   Er/IR N.V.isometrics ER with cane   HEP     punch without assist and BP with hands clasped  H5 2x10 reps BP x10 reps   SL abd with elbow bent or straight x3 reps each SL eccentrics at 90 /RST   SL scap retraction H5 2x 10 reps    Pulleys 20 reps    Wall slides- self A 10 repsPhase 1 strengthening with UE assist 2x10 reps HEP   BP with cane into shld flex stretches with bridges H10 x10 reps    Supine 90/90 A/P , M/L    Serratus anterior BW 2x10 reps   Supine ERH30 x5 reps HEP   No money YTB H5 1x10 + to HEP   IR assist with hand to iliac crest H10 x 10 reps    TB Rows (RTB)  + to HEP   Prone shld ext BW only 1x 10 repsWalkouts ER/IR + to HEPManual Intervention     PROM R shld/ [address] 1-2 mobs  Scope hole massage 15'                                  NMR re-education     Patient education, post-op restrictions/precautions 15'         The finisher  + with atc this visit.                                 Therapeutic Exercise and NMR EXR  [x] (57234) Provided verbal/tactile cueing for activities related to strengthening, flexibility, endurance, ROM  for improvements in scapular, scapulothoracic and UE control with self care, reaching, carrying, lifting, house/yardwork, driving/computer work.    [] (83391) Provided verbal/tactile cueing for activities related to improving balance, coordination, kinesthetic sense, posture, motor skill, proprioception  to assist with  scapular, scapulothoracic and UE control with self care, reaching, carrying, lifting, house/yardwork, driving/computer work. Therapeutic Activities:    [] (06122 or 74929) Provided verbal/tactile cueing for activities related to improving balance, coordination, kinesthetic sense, posture, motor skill, proprioception and motor activation to allow for proper function of scapular, scapulothoracic and UE control with self care, carrying, lifting, driving/computer work.      Home Exercise Program:    [x] (62923) Reviewed/Progressed HEP activities related to strengthening, flexibility, endurance, ROM of scapular, scapulothoracic and UE control with self care, reaching, carrying, lifting, house/yardwork, driving/computer work  [] (69939) Reviewed/Progressed HEP activities related to improving balance, coordination, kinesthetic sense, posture, motor skill, proprioception of scapular, scapulothoracic and UE control with self care, reaching, carrying, lifting, house/yardwork, driving/computer work      Manual Treatments:  PROM / STM / Oscillations-Mobs:  G-I, II, III, IV (PA's, Inf., Post.)  [x] (93626) Provided manual therapy to mobilize soft tissue/joints of cervical/CT, scapular GHJ and UE for the purpose of modulating pain, promoting relaxation,  increasing ROM, reducing/eliminating soft tissue swelling/inflammation/restriction, improving soft tissue extensibility and allowing for proper ROM for normal function with self care, reaching, carrying, lifting, house/yardwork, driving/computer work    Modalities: ES with CP to R shld x 15 min after treatment    Charges:  Timed Code Treatment Minutes: 48'   Total Treatment Minutes: 72' (modality)     [] EVAL (LOW) 80801 (typically 20 minutes face-to-face)  [x] EVAL (MOD) 59914 (typically 30 minutes face-to-face)  [] EVAL (HIGH) 10109 (typically 45 minutes face-to-face)  [x] RE-EVAL     [x] TO(29164) x  1   [] IONTO  [x] NMR (78567) x  1   [] VASO  [x] Manual (70362) x  1    [] Other:  [] TA x       [] [facility] Traction (44834)  [] ES(attended) (69796)      [x] ES (un) (72232):     GOALS: (cut and paste from eval)  Patient stated goal: Decrease pain     Therapist goals for Patient:   Short Term Goals: To be achieved in: 2 weeks  1. Independent in HEP and progression per patient tolerance, in order to prevent re-injury. 2. Patient will have a decrease in pain to facilitate improvement in movement, function, and ADLs as indicated by Functional Deficits.     Long Term Goals: To be achieved in: 12 weeks  1. Disability index score of 35% or less for the [facility] to assist with reaching prior level of function. 2. Patient will demonstrate increased AROM to WNL without pain to allow for proper joint functioning as indicated by patients Functional Deficits. 3. Patient will demonstrate an increase in Strength to 3+//5 to allow for proper functional mobility as indicated by patients Functional Deficits. 4. Patient will return to reaching overhead to fix and dry hair functional activities without increased symptoms or restriction. 5. Patient to be able to open a jar without shld pain and weakness(patient specific functional goal)               Progression Towards Functional goals:  [x] Patient is progressing as expected towards functional goals listed. [] Progression is slowed due to complexities listed. [] Progression has been slowed due to co-morbidities. [] Plan just implemented, too soon to assess goals progression  [] Other:     ASSESSMENT: Able to tolerated strengthening exercises with less pain this visit. PROM improving. Progresssed HEP with TB this visit. Treatment/Activity Tolerance:  [x] Patient tolerated treatment well [] Patient limited by fatique  [] Patient limited by pain  [] Patient limited by other medical complications  [] Other:     Prognosis: [x] Good [] Fair  [] Poor    Patient Requires Follow-up: [x] Yes  [] No    PLAN: Cont PT. 1-2x/week.  Progress as tolerated.   [x] Continue per plan of care [] Alter current plan (see comments)  [] Plan of care initiated [] Hold pending MD visit [] Discharge    Electronically signed by: Carmela Petit, [address]

## 2019-05-07 NOTE — FLOWSHEET NOTE
[facility],  [address] [place]  Phone: [phone]  Fax [phone]    ATHLETIC TRAINING [address]  Date:  2019    Patient Name:  Hope Feliciano  \"Anjali\"  :  1945  MRN: [id number]  Restrictions/Precautions:    Medical/Treatment Diagnosis Information:  ·  R RTCR, SAD, long head of biceps tenodesis  ·    Physician Information:   Favorito      Weeks Post-op  2wks    4 wks 6 wks 8 wks   3wks 6 wks 9 wks 12 wks                        Activity Log                                                          DOS/DOI: 19                                                         Date: 19   ATC Communication        Finisher 2# push/pull, ladder, scaption, R/L sweep, circles x10       [place]      Chop                         Chest                         ER Flip        Long/Short lever  Flex. Scap.                                 ABd.         punch        Body/Cardio Blade Flex/Ext                                    IR/ER                                    ABd/ADd        Push-up      Plus                           Wall                         Table                        Floor        Ball on Wall                Tramp        Theraband    Rows/Ext                          IR                          ER                          No money                          Horiz. ABd                          Biceps                          Triceps                          Punches        Cable Column   Rows                               Ext.                                Lat. Pulldown                               Biceps                               Triceps        Modality PM/CP 15'   Initials DTM   Time spent one on one (workers comp)    Time spent with PT assistant

## 2019-05-10 ENCOUNTER — HOSPITAL ENCOUNTER (OUTPATIENT)
Dept: PHYSICAL THERAPY | Age: 74
Setting detail: THERAPIES SERIES
Discharge: HOME | End: 2019-05-10
Payer: MEDICARE

## 2019-05-10 PROCEDURE — 97140 MANUAL THERAPY 1/> REGIONS: CPT | Performed by: PHYSICAL THERAPIST

## 2019-05-10 PROCEDURE — G0283 ELEC STIM OTHER THAN WOUND: HCPCS | Performed by: PHYSICAL THERAPIST

## 2019-05-10 PROCEDURE — 97110 THERAPEUTIC EXERCISES: CPT | Performed by: PHYSICAL THERAPIST

## 2019-05-10 PROCEDURE — 97112 NEUROMUSCULAR REEDUCATION: CPT | Performed by: PHYSICAL THERAPIST

## 2019-05-10 NOTE — FLOWSHEET NOTE
without fraying  3. Rotator Cuff- There was a full thickness tear of the supraspinatus tendon and anterior 50% of the infraspinatus tendon. There was a full thickness tear of the upper 50% of the subscapularis tendon. 4. Inferior Axillary Recess. No loose bodies  5. Articular Surfaces. Intact          Exercises/Interventions: Held strengthening exercises this visit due to increase soreness today. Therapeutic Ex Sets/rep comments   FAV 4 10 x10 reps HEP   Flex/scap table slides H10 x10 + scap to HEP   Cross arm UE stretch in supine H30 x5 reps    SB flexion slides/scaption slides     Ext/Abd isometrics  + to HEP   Er/IR N.V.isometrics ER with cane   HEP     punch without assist and BP with hands clasped  H5 2x10 reps BP x10 reps   SL Er x10 reps     SL abd with elbow straight x10  reps each    SL scap retraction H5 2x 10 reps    Pulleys 20 reps    Wall slides- self A 10 repsPhase 1 strengthening with UE assist 2x10 reps HEP   BP with cane into shld flex stretches with bridges H10 x10 reps    Supine 90/90 A/P , M/L    Serratus anterior 1#  2x10 reps   Supine ERH30 x5 reps HEP   No money YTB H5 1x10 + to HEP   IR assist with hand to iliac crest H10 x 10 reps    TB Rows (RTB)/IR H5 1 x 10 reps Rows HEP   Prone shld ext BW only 2x 10 repsWalkouts ER/IR + to HEPManual Intervention     PROM R shld/ [address] 1-2 mobs  Scope hole massage 15'                                  NMR re-education     Patient education, post-op restrictions/precautions 15'         The finisher  + with atc this visit.                                 Therapeutic Exercise and NMR EXR  [x] (50973) Provided verbal/tactile cueing for activities related to strengthening, flexibility, endurance, ROM  for improvements in scapular, scapulothoracic and UE control with self care, reaching, carrying, lifting, house/yardwork, driving/computer work.    [] (46153) Provided verbal/tactile cueing for activities related to improving balance, coordination, kinesthetic sense, posture, motor skill, proprioception  to assist with  scapular, scapulothoracic and UE control with self care, reaching, carrying, lifting, house/yardwork, driving/computer work. Therapeutic Activities:    [] (48807 or 93726) Provided verbal/tactile cueing for activities related to improving balance, coordination, kinesthetic sense, posture, motor skill, proprioception and motor activation to allow for proper function of scapular, scapulothoracic and UE control with self care, carrying, lifting, driving/computer work.      Home Exercise Program:    [x] (06927) Reviewed/Progressed HEP activities related to strengthening, flexibility, endurance, ROM of scapular, scapulothoracic and UE control with self care, reaching, carrying, lifting, house/yardwork, driving/computer work  [] (80326) Reviewed/Progressed HEP activities related to improving balance, coordination, kinesthetic sense, posture, motor skill, proprioception of scapular, scapulothoracic and UE control with self care, reaching, carrying, lifting, house/yardwork, driving/computer work      Manual Treatments:  PROM / STM / Oscillations-Mobs:  G-I, II, III, IV (PA's, Inf., Post.)  [x] (41256) Provided manual therapy to mobilize soft tissue/joints of cervical/CT, scapular GHJ and UE for the purpose of modulating pain, promoting relaxation,  increasing ROM, reducing/eliminating soft tissue swelling/inflammation/restriction, improving soft tissue extensibility and allowing for proper ROM for normal function with self care, reaching, carrying, lifting, house/yardwork, driving/computer work    Modalities: ES with CP to R shld x 15 min after treatment    Charges:  Timed Code Treatment Minutes: 50'   Total Treatment Minutes: 72' (modality)     [] EVAL (LOW) 76135 (typically 20 minutes face-to-face)  [x] EVAL (MOD) 35363 (typically 30 minutes face-to-face)  [] EVAL (HIGH) 93652 (typically 45 minutes face-to-face)  [x] RE-EVAL     [x] RV(25502) x  1

## 2019-05-10 NOTE — FLOWSHEET NOTE
[facility],  [address] [place]  Phone: [phone]  Fax [phone]    ATHLETIC TRAINING [address]  Date:  5/10/2019    Patient Name:  Rfafi Rascon  \"Anjali\"  :  1945  MRN: [id number]  Restrictions/Precautions:    Medical/Treatment Diagnosis Information:  ·  R RTCR, SAD, long head of biceps tenodesis     Physician Information:   Favorito      Weeks Post-op  2wks    4 wks 6 wks 8 wks   3wks 6 wks 9 wks 12 wks   19                     Activity Log                                                          DOS/DOI: 19                                                         Date: 5/7/19 5/10/19   ATC Communication  Encouraged to walk with natural arm swing. Advised to try IR stretch in hot shower        True Stretch  B arms in FF, 3D hip drives w/manual scap loading  R arm in scap on 3rd bar, L lat hip drives        Finisher 2# push/pull, ladder, scaption, R/L sweep, circles x10 2# push/pull, ladder, scaption, circles x10        Plyoback      Chop                          Chest                          ER Flip          Long/Short lever  Flex. Scap.                                  ABd.           punch          Body/Cardio Blade Flex/Ext                                     IR/ER                                     ABd/ADd          Push-up      Plus                            Wall                          Table                         Floor          Ball on Wall                 Tramp          Theraband    Rows/Ext                           IR                           ER                           No money                           Horiz. ABd                           Biceps                           Triceps                           Punches          Cable Column   Rows                                Ext.                                 Michelle Certain

## 2019-05-14 ENCOUNTER — HOSPITAL ENCOUNTER (OUTPATIENT)
Dept: PHYSICAL THERAPY | Age: 74
Setting detail: THERAPIES SERIES
Discharge: HOME | End: 2019-05-14
Payer: MEDICARE

## 2019-05-14 PROCEDURE — 97112 NEUROMUSCULAR REEDUCATION: CPT | Performed by: PHYSICAL THERAPIST

## 2019-05-14 PROCEDURE — 97140 MANUAL THERAPY 1/> REGIONS: CPT | Performed by: PHYSICAL THERAPIST

## 2019-05-14 PROCEDURE — 97110 THERAPEUTIC EXERCISES: CPT | Performed by: PHYSICAL THERAPIST

## 2019-05-14 PROCEDURE — G0283 ELEC STIM OTHER THAN WOUND: HCPCS | Performed by: PHYSICAL THERAPIST

## 2019-05-14 NOTE — FLOWSHEET NOTE
[facility], [address] [place]  Phone: [phone]  Fax [phone]    ATHLETIC TRAINING [address]  Date:  2019    Patient Name:  Aniyah Borges  \"Anjali\"  :  1945  MRN: [id number]  Restrictions/Precautions:    Medical/Treatment Diagnosis Information:  ·  R RTCR, SAD, long head of biceps tenodesis     Physician Information:   Favorito      Weeks Post-op  2wks    4 wks 6 wks 8 wks   3wks 6 wks 9 wks 12 wks   19                     Activity Log                                                          DOS/DOI: 19                                                         Date: 5/7/19 5/10/19 5/14/19   ATC Communication  Encouraged to walk with natural arm swing. Advised to try IR stretch in hot shower          True Stretch  B arms in FF, 3D hip drives w/manual scap loading  R arm in scap on 3rd bar, L lat hip drives B arms in FF, 3D hip drives w/manual scap loading  R arm in scap on 3rd bar, L lat hip drives         Finisher 2# push/pull, ladder, scaption, R/L sweep, circles x10 2# push/pull, ladder, scaption, circles x10 2# push/pull, ladder, scaption, circles x10         Plyoback      Chop                           Chest                           ER Flip            Long/Short lever  Flex. Scap.                                   ABd.             punch            Body/Cardio Blade Flex/Ext                                      IR/ER                                      ABd/ADd            Push-up      Plus                             Wall                           Table                          Floor            Ball on Wall                  Tramp            Theraband    Rows/Ext                            IR                            ER                            No money                            Horiz.  ABd                            Biceps Triceps                            Punches            Cable Column   Rows   Seated 3pl 2x10                              Ext.                                  LatNanci Luis                                 Biceps                                 Triceps            Modality PM/CP 15' PM/CP 15' PM/CP 13'   Initials DTM JLW DTM   Time spent one on one (workers comp)      Time spent with PT assistant

## 2019-05-14 NOTE — FLOWSHEET NOTE
[facility],  [address]  Phone: [phone]  Fax [phone]      Physical Therapy Daily Treatment Note  Date:  2019    Patient Name:  Shakila Marcelino  \" Anjali\"  :  1945  MRN: [id number]  Restrictions/Precautions:    Medical/Treatment Diagnosis Information:  · Diagnosis: M75.121 tear of R Rotator cuff, s/p R RCR, SAD, long head of biceps tenodesis on 19  · Treatment Diagnosis: M75.121 tear of R Rotator cuff, R shld pain M25.511D, s/p R RCR, SAD, long head of biceps tenodesis on 0/3/66  Insurance/Certification information:  PT Insurance Information: MC/ AARP  Physician Information:  Referring Practitioner: Dr. Valencia [facility] care signed (Y/N):     Date of Patient follow up with Physician: end of May. G-Code (if applicable):      Date G-Code Applied:         Progress Note: []  Yes  [x]  No  Next due by: Visit #10      Latex Allergy:  [x]NO      []YES  Preferred Language for Healthcare:   [x]English       []other:    Visit # Insurance Allowable Requires auth   14 MC/AARP    []no        []yes:   12 weeks post-op 19  Pain level:  2-3/10     Height: 5' 5\", Weight: 135lbs. SUBJECTIVE:  Reports       OBJECTIVE: Able to actively raise UE ~ 90 for shld flex, ~90 for abd. Progressed HEP this visit. Provided Education in post-operative precautions/contraindications. Flex with cane 145 this visit. Observation:   Test measurements: On  Flex:150, ER:70. 160 flex AAROM with cane. PROM Flex: 160     RESTRICTIONS/PRECAUTIONS:  Abduction pillow /sling. Massive RCR protocol ( RCR, SAD and bicep tenodesis 19)   Post-op note. 1. Superior Labrum/Biceps Tendon. Extensive tearing of the long head biceps tendon. Intact superior labrum   2. Anterior/Posterior Labrum- Intact without fraying  3.  Rotator Cuff- There was a full thickness tear of the supraspinatus tendon and anterior 50% of the infraspinatus tendon. There was a full thickness tear of the upper 50% of the subscapularis tendon. 4. Inferior Axillary Recess. No loose bodies  5. Articular Surfaces. Intact          Exercises/Interventions: Held strengthening exercises this visit due to increase soreness today.   Therapeutic Ex Sets/rep comments   FAV 4 10 x10 reps HEP   Flex/scap table slides H10 x10 + scap to HEP   Cross arm UE stretch in supine H30 x5 reps    SB flexion slides/scaption slides     Ext/Abd isometrics  + to HEP   Er/IR N.V.isometrics ER with cane   HEP     punch without assist and BP with hands clasped  H5 2x10 reps BP x10 reps   SL Er x10 reps     SL abd with elbow straight 1x10,5  reps each    SL scap retraction H5 2x 10 reps    Pulleys 20 reps    Wall push-ups x10 repsWall slides- self A/ X pattern  10 repsPhase 1 strengthening with UE assist 2x10 reps HEP   BP with cane into shld flex stretches with bridges H10 x10 reps    Supine 90/90 A/P , M/L    Serratus anterior 1#  2x10 reps   Supine ERH30 x5 reps HEP   No money YTB H5 1x10 + to HEP   IR assist with strap H10 x 10 reps    IR TB ROM assist with hip movements H10 x10 reps    TB Rows (RTB)/IR H5 1 x 10 reps Rows HEP   TB overhead flex eccentrics walkouts H5 x10 reps    Prone shld ext 1#  2x 10 repsWalkouts ER/IR + to HEPManual Intervention     PROM R [facility] 1-2 mobs  Scope hole massage 15'                                  NMR re-education     Patient education, post-op restrictions/precautions 15'         The finisher  With ATC                                Therapeutic Exercise and NMR EXR  [x] (19672) Provided verbal/tactile cueing for activities related to strengthening, flexibility, endurance, ROM  for improvements in scapular, scapulothoracic and UE control with self care, reaching, carrying, lifting, house/yardwork, driving/computer work.    [] (69566) Provided verbal/tactile cueing for activities related to improving balance, coordination, kinesthetic sense, posture, motor skill, proprioception  to assist with  scapular, scapulothoracic and UE control with self care, reaching, carrying, lifting, house/yardwork, driving/computer work. Therapeutic Activities:    [] (29387 or 12486) Provided verbal/tactile cueing for activities related to improving balance, coordination, kinesthetic sense, posture, motor skill, proprioception and motor activation to allow for proper function of scapular, scapulothoracic and UE control with self care, carrying, lifting, driving/computer work.      Home Exercise Program:    [x] (00043) Reviewed/Progressed HEP activities related to strengthening, flexibility, endurance, ROM of scapular, scapulothoracic and UE control with self care, reaching, carrying, lifting, house/yardwork, driving/computer work  [] (85508) Reviewed/Progressed HEP activities related to improving balance, coordination, kinesthetic sense, posture, motor skill, proprioception of scapular, scapulothoracic and UE control with self care, reaching, carrying, lifting, house/yardwork, driving/computer work      Manual Treatments:  PROM / STM / Oscillations-Mobs:  G-I, II, III, IV (PA's, Inf., Post.)  [x] (01223) Provided manual therapy to mobilize soft tissue/joints of cervical/CT, scapular GHJ and UE for the purpose of modulating pain, promoting relaxation,  increasing ROM, reducing/eliminating soft tissue swelling/inflammation/restriction, improving soft tissue extensibility and allowing for proper ROM for normal function with self care, reaching, carrying, lifting, house/yardwork, driving/computer work    Modalities: ES with CP to R shld x 15 min after treatment    Charges:  Timed Code Treatment Minutes: 50'   Total Treatment Minutes: 72' (modality)     [] EVAL (LOW) 75438 (typically 20 minutes face-to-face)  [x] EVAL (MOD) 56556 (typically 30 minutes face-to-face)  [] EVAL (HIGH) 97466 (typically 45 minutes face-to-face)  [x] RE-EVAL     [x] LG(60079) x  1   [] IONTO  [x] NMR Prognosis: [x] Good [] Fair  [] Poor    Patient Requires Follow-up: [x] Yes  [] No    PLAN: Cont PT.2x/week. Progress as tolerated.   [x] Continue per plan of care [] Alter current plan (see comments)  [] Plan of care initiated [] Hold pending MD visit [] Discharge    Electronically signed by: Antonio Astorga, [address] [facility]

## 2019-05-16 ENCOUNTER — HOSPITAL ENCOUNTER (OUTPATIENT)
Dept: PHYSICAL THERAPY | Age: 74
Setting detail: THERAPIES SERIES
Discharge: HOME | End: 2019-05-16
Payer: MEDICARE

## 2019-05-16 PROCEDURE — 97140 MANUAL THERAPY 1/> REGIONS: CPT | Performed by: PHYSICAL THERAPIST

## 2019-05-16 PROCEDURE — 97112 NEUROMUSCULAR REEDUCATION: CPT | Performed by: PHYSICAL THERAPIST

## 2019-05-16 PROCEDURE — G0283 ELEC STIM OTHER THAN WOUND: HCPCS | Performed by: PHYSICAL THERAPIST

## 2019-05-16 PROCEDURE — 97110 THERAPEUTIC EXERCISES: CPT | Performed by: PHYSICAL THERAPIST

## 2019-05-16 NOTE — FLOWSHEET NOTE
[facility],  [address] [place]  Phone: [phone]  Fax [phone]      Physical Therapy Daily Treatment Note  Date:  2019    Patient Name:  Patricia Elkins  \" Anjali\"  :  1945  MRN: [id number]  Restrictions/Precautions:    Medical/Treatment Diagnosis Information:  · Diagnosis: M75.121 tear of R Rotator cuff, s/p R RCR, SAD, long head of biceps tenodesis on 19  · Treatment Diagnosis: M75.121 tear of R Rotator cuff, R shld pain M25.511D, s/p R RCR, SAD, long head of biceps tenodesis on 99  Insurance/Certification information:  PT Insurance Information: MC/ AARP  Physician Information:  Referring Practitioner: Dr. John Mc of care signed (Y/N):     Date of Patient follow up with Physician: end of May. G-Code (if applicable):      Date G-Code Applied:         Progress Note: []  Yes  [x]  No  Next due by: Visit #10      Latex Allergy:  [x]NO      []YES  Preferred Language for Healthcare:   [x]English       []other:    Visit # Insurance Allowable Requires auth   15 MC/AARP    []no        []yes:   12 weeks post-op 19  Pain level:  2-3/10     Height: 5' 5\", Weight: 135lbs. SUBJECTIVE:  Shoulder is improving. Pain is less intense. Notes improvement in ROM- overhead and behind the head is easier- behind the back is still difficult. OBJECTIVE: Able to actively raise UE ~ 90 for shld flex, ~90 for abd. Progressed HEP this visit. Provided Education in post-operative precautions/contraindications. Flex with cane 145 this visit. Observation:   Test measurements: On  Flex:150, ER:70. 160 flex AAROM with cane. PROM Flex: 160     RESTRICTIONS/PRECAUTIONS:  Abduction pillow /sling. Massive RCR protocol ( RCR, SAD and bicep tenodesis 19)   Post-op note. 1. Superior Labrum/Biceps Tendon. Extensive tearing of the long head biceps tendon. Intact superior labrum   2.  Anterior/Posterior Labrum- Intact without fraying  3. Rotator Cuff- There was a full thickness tear of the supraspinatus tendon and anterior 50% of the infraspinatus tendon. There was a full thickness tear of the upper 50% of the subscapularis tendon. 4. Inferior Axillary Recess. No loose bodies  5. Articular Surfaces. Intact          Exercises/Interventions: Held strengthening exercises this visit due to increase soreness today. Therapeutic Ex Sets/rep comments   FAV 4 10 x10 reps HEP   Flex/scap table slides H10 x10 + scap to HEP   Cross arm UE stretch in supine H30 x5 reps    SB flexion slides/scaption slides     Ext/Abd isometrics  + to HEP    ER with cane   HEP     punch without assist and BP with hands clasped  H5 2x10 reps BP x10 reps   SL Er H5 x15 reps  VC for alignment   SL abd with elbow straight 2 x10 reps each    SL scap retraction H5 2x 10 reps    Pulleys 20 reps    Wall push-ups x10 repsWall slides- straight/ X pattern  10 reps eaHEP   BP with cane into shld flex stretches with bridges H10 x10 reps    Supine 90/90 A/P , M/L  Tricep skull  2# 3 x 10   Serratus anterior 2#  H5 3x10 reps   Gentle post capsule (s)  Gentle sleeper (s) in SLH30 x 3 reps HEP   No money YTB H5 1x10 + to HEP   IR assist with strap H10 x 10 reps    IR TB ROM assist with hip movements H10 x10 reps    TB Rows (RTB) H5 3 x 10 reps Rows HEP   TB overhead flex eccentrics walkouts H5 x10 reps    Prone shld ext 1#  2x 10 repsWalkouts ER YTB H5 x 10 reps + to HEPManual Intervention     PROM R shld/ [address] 1-2 mobs  Scope hole massage 15'                                  NMR re-education             The finisher  With ATC                                Therapeutic Exercise and NMR EXR  [x] (67162) Provided verbal/tactile cueing for activities related to strengthening, flexibility, endurance, ROM  for improvements in scapular, scapulothoracic and UE control with self care, reaching, carrying, lifting, house/yardwork, driving/computer work. [] (62555) Provided verbal/tactile cueing for activities related to improving balance, coordination, kinesthetic sense, posture, motor skill, proprioception  to assist with  scapular, scapulothoracic and UE control with self care, reaching, carrying, lifting, house/yardwork, driving/computer work. Therapeutic Activities:    [] (35328 or 69591) Provided verbal/tactile cueing for activities related to improving balance, coordination, kinesthetic sense, posture, motor skill, proprioception and motor activation to allow for proper function of scapular, scapulothoracic and UE control with self care, carrying, lifting, driving/computer work.      Home Exercise Program:    [x] (54564) Reviewed/Progressed HEP activities related to strengthening, flexibility, endurance, ROM of scapular, scapulothoracic and UE control with self care, reaching, carrying, lifting, house/yardwork, driving/computer work  [] (53554) Reviewed/Progressed HEP activities related to improving balance, coordination, kinesthetic sense, posture, motor skill, proprioception of scapular, scapulothoracic and UE control with self care, reaching, carrying, lifting, house/yardwork, driving/computer work      Manual Treatments:  PROM / STM / Oscillations-Mobs:  G-I, II, III, IV (PA's, Inf., Post.)  [x] (70805) Provided manual therapy to mobilize soft tissue/joints of cervical/CT, scapular GHJ and UE for the purpose of modulating pain, promoting relaxation,  increasing ROM, reducing/eliminating soft tissue swelling/inflammation/restriction, improving soft tissue extensibility and allowing for proper ROM for normal function with self care, reaching, carrying, lifting, house/yardwork, driving/computer work    Modalities: ES with CP to R shld x 15 min after treatment    Charges:  Timed Code Treatment Minutes: 50'   Total Treatment Minutes: 80' (ATC +modality)     [] EVAL (LOW) 48949 (typically 20 minutes face-to-face)  [x] EVAL (MOD) 45621 (typically 30 minutes face-to-face)  [] EVAL (HIGH) 87602 (typically 45 minutes face-to-face)  [x] RE-EVAL     [x] XI(74817) x  1   [] IONTO  [x] NMR (46059) x  1   [] VASO  [x] Manual (91080) x  1    [] Other:  [] TA x       [] Mech Traction (80614)  [] ES(attended) (43091)      [x] ES (un) (20605):     GOALS:  Patient stated goal: Decrease pain     Therapist goals for Patient:   Short Term Goals: To be achieved in: 2 weeks  1. Independent in HEP and progression per patient tolerance, in order to prevent re-injury. 2. Patient will have a decrease in pain to facilitate improvement in movement, function, and ADLs as indicated by Functional Deficits.     Long Term Goals: To be achieved in: 12 weeks  1. Disability index score of 35% or less for the [facility] to assist with reaching prior level of function. 2. Patient will demonstrate increased AROM to WNL without pain to allow for proper joint functioning as indicated by patients Functional Deficits. 3. Patient will demonstrate an increase in Strength to 3+//5 to allow for proper functional mobility as indicated by patients Functional Deficits. 4. Patient will return to reaching overhead to fix and dry hair functional activities without increased symptoms or restriction. 5. Patient to be able to open a jar without shld pain and weakness(patient specific functional goal)               Progression Towards Functional goals:  [x] Patient is progressing as expected towards functional goals listed. [] Progression is slowed due to complexities listed. [] Progression has been slowed due to co-morbidities. [] Plan just implemented, too soon to assess goals progression  [] Other:     ASSESSMENT: Able to tolerate strengthening exercises with less pain this visit. PROM improving. Progresssed HEP with TB this visit. IR ROM has improved this visit. Capsular tightness is less this visit.     Treatment/Activity Tolerance:  [x] Patient tolerated treatment well [] Patient limited by farhana  [] Patient limited by pain  [] Patient limited by other medical complications  [] Other:     Prognosis: [x] Good [] Fair  [] Poor    Patient Requires Follow-up: [x] Yes  [] No    PLAN: Cont PT.2x/week. Progress as tolerated.   [x] Continue per plan of care [] Alter current plan (see comments)  [] Plan of care initiated [] Hold pending MD visit [] Discharge    Electronically signed by: Jami Killian

## 2019-05-16 NOTE — FLOWSHEET NOTE
[facility],  [address] [place]  Phone: [phone]  Fax [phone]    ATHLETIC TRAINING [address]  Date:  2019    Patient Name:  Noam Ramirez  \"Anjali\"  :  1945  MRN: [id number]  Restrictions/Precautions:    Medical/Treatment Diagnosis Information:  ·  R RTCR, SAD, long head of biceps tenodesis     Physician Information:   Favorito      Weeks Post-op  2wks    4 wks 6 wks 8 wks   3wks 6 wks 9 wks 12 wks   19                     Activity Log                                                          DOS/DOI: 19                                                         Date: 5/7/19 5/10/19 5/14/19 05/16/19   ATC Communication  Encouraged to walk with natural arm swing. Advised to try IR stretch in hot shower            True Stretch  B arms in FF, 3D hip drives w/manual scap loading  R arm in scap on 3rd bar, L lat hip drives B arms in FF, 3D hip drives w/manual scap loading  R arm in scap on 3rd bar, L lat hip drives B arms in FF, 3D hip drives w/manual scap loading  R arm in scap on 3rd bar, L lat hip drives 09-87O ea          Finisher 2# push/pull, ladder, scaption, R/L sweep, circles x10 2# push/pull, ladder, scaption, circles x10 2# push/pull, ladder, scaption, circles x10           Plyoback      Chop                            Chest                            ER Flip              Long/Short lever  Flex.     Low Trap Raise 15x                                Scap.                                    ABd.               punch              Body/Cardio Blade Flex/Ext                                       IR/ER                                       ABd/ADd              Push-up      Plus                              Wall                            Table                           Floor              Ball on Wall                   Tramp              Theraband    Rows/Ext    YTB Ecc 15x

## 2019-05-21 ENCOUNTER — HOSPITAL ENCOUNTER (OUTPATIENT)
Dept: PHYSICAL THERAPY | Age: 74
Setting detail: THERAPIES SERIES
Discharge: HOME | End: 2019-05-21
Payer: MEDICARE

## 2019-05-21 PROCEDURE — 97110 THERAPEUTIC EXERCISES: CPT | Performed by: PHYSICAL THERAPIST

## 2019-05-21 PROCEDURE — 97140 MANUAL THERAPY 1/> REGIONS: CPT | Performed by: PHYSICAL THERAPIST

## 2019-05-21 PROCEDURE — 97112 NEUROMUSCULAR REEDUCATION: CPT | Performed by: PHYSICAL THERAPIST

## 2019-05-21 PROCEDURE — G0283 ELEC STIM OTHER THAN WOUND: HCPCS | Performed by: PHYSICAL THERAPIST

## 2019-05-21 NOTE — FLOWSHEET NOTE
[facility], [address] [place]  Phone: [phone]  Fax [phone]    ATHLETIC TRAINING [address]  Date:  2019    Patient Name:  Gregor Lee  \"Anjali\"  :  1945  MRN: [id number]  Restrictions/Precautions:    Medical/Treatment Diagnosis Information:  ·  R RTCR, SAD, long head of biceps tenodesis     Physician Information:   Favorito      Weeks Post-op  2wks    4 wks 6 wks 8 wks   3wks 6 wks 9 wks 12 wks   19                     Activity Log                                                          DOS/DOI: 19                                                         Date: 5/10/19 5/14/19 05/16/19 5/21/19   ATC Communication Encouraged to walk with natural arm swing. Advised to try IR stretch in hot shower             True Stretch B arms in FF, 3D hip drives w/manual scap loading  R arm in scap on 3rd bar, L lat hip drives B arms in FF, 3D hip drives w/manual scap loading  R arm in scap on 3rd bar, L lat hip drives B arms in FF, 3D hip drives w/manual scap loading  R arm in scap on 3rd bar, L lat hip drives 10-02P ea B arms in FF, 3D hip drives w/manual scap loading  R arm in scap on 3rd bar, L lat hip drives 85-35X ea  Pec stretch (low) 15x5\"          Finisher 2# push/pull, ladder, scaption, circles x10 2# push/pull, ladder, scaption, circles x10            Plyoback      Chop                            Chest                            ER Flip              Long/Short lever  Flex.    Low Trap Raise 15x                                 Scap.                                    ABd.               punch              Body/Cardio Blade Flex/Ext                                       IR/ER                                       ABd/ADd              Push-up      Plus                              Wall                            Table                           Floor              Ball on Wall Tramp              Theraband    Rows/Ext   YTB Ecc 15x YTB ext 2x10                         IR                             ER                             No money                             Horiz. ABd                             Biceps                             Triceps   YTB Ecc 15x                          Punches              Cable Column   Rows  Seated 3pl 2x10  15# (B) 2x10                              Ext.                                   LatNada Hull                                  Biceps                                  Triceps              Modality PM/CP 15' PM/CP 13' PM/CP 13' PM/CP 13'   Initials JLW DTM EP JLW   Time spent one on one (workers comp)       Time spent with PT assistant

## 2019-05-21 NOTE — FLOWSHEET NOTE
superior labrum   2. Anterior/Posterior Labrum- Intact without fraying  3. Rotator Cuff- There was a full thickness tear of the supraspinatus tendon and anterior 50% of the infraspinatus tendon. There was a full thickness tear of the upper 50% of the subscapularis tendon. 4. Inferior Axillary Recess. No loose bodies  5. Articular Surfaces. Intact          Exercises/Interventions: Held strengthening exercises this visit due to increase soreness today.   Therapeutic Ex Sets/rep comments   FAV 4 10 x10 reps HEP   Flex/scap table slides H10 x10 + scap to HEP   Cross arm UE stretch in supine H30 x5 reps    SB flexion slides/scaption slides     Ext/Abd isometrics  + to HEP   Supine Er with 1.5 wt 3'     ER with cane   HEP     punch without assist and BP with hands clasped  SL Er SL abd with elbow straight 2 x10 reps each    SL scap retraction H5 2x 10 reps    Pulleys 20 reps    Wall push-ups x10 repsWall slides- straight/ X pattern  10 reps eaHEP   BP with cane into shld flex stretches with bridges H10 x10 reps    Supine 90/90 A/P , M/L  Tricep skull  2# 3 x 10   Serratus anterior 2#  H5 3x10 reps   Gentle post capsule (s)  Gentle sleeper (s) in SLH30 x 3 reps HEP   No money YTB H5 1x10  HEP   IR assist with strap H10 x 10 reps    IR TB ROM assist with hip movements H10 x10 reps    TB Rows (RTB) H5 3 x 10 reps Rows HEP   TB overhead flex eccentrics walkouts H5 x10 reps    TB ER/IR H5 2x10 reps+ to HEPProne shld ext 1#  2x 10 repsWalkouts ER YTB H5 x 10 reps HEPManual Intervention     PROM R shld/ [address] 1-2 mobs  Scope hole massage 15'                                  NMR re-education             The finisher  With ATC                                Therapeutic Exercise and NMR EXR  [x] (26171) Provided verbal/tactile cueing for activities related to strengthening, flexibility, endurance, ROM  for improvements in scapular, scapulothoracic and UE control with self care, reaching, carrying, lifting, house/yardwork, driving/computer work.    [] (21206) Provided verbal/tactile cueing for activities related to improving balance, coordination, kinesthetic sense, posture, motor skill, proprioception  to assist with  scapular, scapulothoracic and UE control with self care, reaching, carrying, lifting, house/yardwork, driving/computer work. Therapeutic Activities:    [] (76453 or 83568) Provided verbal/tactile cueing for activities related to improving balance, coordination, kinesthetic sense, posture, motor skill, proprioception and motor activation to allow for proper function of scapular, scapulothoracic and UE control with self care, carrying, lifting, driving/computer work.      Home Exercise Program:    [x] (10504) Reviewed/Progressed HEP activities related to strengthening, flexibility, endurance, ROM of scapular, scapulothoracic and UE control with self care, reaching, carrying, lifting, house/yardwork, driving/computer work  [] (41107) Reviewed/Progressed HEP activities related to improving balance, coordination, kinesthetic sense, posture, motor skill, proprioception of scapular, scapulothoracic and UE control with self care, reaching, carrying, lifting, house/yardwork, driving/computer work      Manual Treatments:  PROM / STM / Oscillations-Mobs:  G-I, II, III, IV (PA's, Inf., Post.)  [x] (74112) Provided manual therapy to mobilize soft tissue/joints of cervical/CT, scapular GHJ and UE for the purpose of modulating pain, promoting relaxation,  increasing ROM, reducing/eliminating soft tissue swelling/inflammation/restriction, improving soft tissue extensibility and allowing for proper ROM for normal function with self care, reaching, carrying, lifting, house/yardwork, driving/computer work    Modalities: ES with CP to R shld x 15 min after treatment    Charges:  Timed Code Treatment Minutes: 50'   Total Treatment Minutes: 80' (ATC +modality)     [] EVAL (LOW) 21412 (typically 20 minutes face-to-face)  [x] EVAL (MOD) 34409 (typically 30 minutes face-to-face)  [] EVAL (HIGH) 07938 (typically 45 minutes face-to-face)  [x] RE-EVAL     [x] UY(04518) x  1   [] IONTO  [x] NMR (88401) x  1   [] VASO  [x] Manual (40635) x  1    [] Other:  [] TA x       [] Mech Traction (47395)  [] ES(attended) (99728)      [x] ES (un) (67097):     GOALS:  Patient stated goal: Decrease pain     Therapist goals for Patient:   Short Term Goals: To be achieved in: 2 weeks  1. Independent in HEP and progression per patient tolerance, in order to prevent re-injury. 2. Patient will have a decrease in pain to facilitate improvement in movement, function, and ADLs as indicated by Functional Deficits.     Long Term Goals: To be achieved in: 12 weeks  1. Disability index score of 35% or less for the [facility] to assist with reaching prior level of function. 2. Patient will demonstrate increased AROM to WNL without pain to allow for proper joint functioning as indicated by patients Functional Deficits. 3. Patient will demonstrate an increase in Strength to 3+//5 to allow for proper functional mobility as indicated by patients Functional Deficits. 4. Patient will return to reaching overhead to fix and dry hair functional activities without increased symptoms or restriction. 5. Patient to be able to open a jar without shld pain and weakness(patient specific functional goal)               Progression Towards Functional goals:  [x] Patient is progressing as expected towards functional goals listed. [] Progression is slowed due to complexities listed. [] Progression has been slowed due to co-morbidities. [] Plan just implemented, too soon to assess goals progression  [] Other:     ASSESSMENT: Able to tolerate strengthening exercises with less pain this visit. PROM improving. Pain decreasing in general. Numbness noted in index finger during and after Supine ER stretches.      Treatment/Activity Tolerance:  [x] Patient tolerated treatment well [] Patient limited by fatique  [] Patient limited by pain  [] Patient limited by other medical complications  [] Other:     Prognosis: [x] Good [] Fair  [] Poor    Patient Requires Follow-up: [x] Yes  [] No    PLAN: Cont PT.2x/week. Progress as tolerated.   [x] Continue per plan of care [] Alter current plan (see comments)  [] Plan of care initiated [] Hold pending MD visit [] Discharge    Electronically signed by: Carmina Shi, [address]

## 2019-05-24 ENCOUNTER — HOSPITAL ENCOUNTER (OUTPATIENT)
Dept: PHYSICAL THERAPY | Age: 74
Setting detail: THERAPIES SERIES
Discharge: HOME | End: 2019-05-24
Payer: MEDICARE

## 2019-05-24 PROCEDURE — G0283 ELEC STIM OTHER THAN WOUND: HCPCS | Performed by: PHYSICAL THERAPIST

## 2019-05-24 PROCEDURE — 97140 MANUAL THERAPY 1/> REGIONS: CPT | Performed by: PHYSICAL THERAPIST

## 2019-05-24 PROCEDURE — 97110 THERAPEUTIC EXERCISES: CPT | Performed by: PHYSICAL THERAPIST

## 2019-05-24 PROCEDURE — 97112 NEUROMUSCULAR REEDUCATION: CPT | Performed by: PHYSICAL THERAPIST

## 2019-05-24 NOTE — OP NOTE
[facility], [address] [place] [address]  Phone: [phone]  Fax [phone]    Date: 5/24/2019 Physician: Dr. Ze Vallecillo Patient: Colonel Mitchell Diagnosis:R RCR    Patient has received 17 sessions of Physical Therapy over a 14-15 week period. Functional Questionnaire Score: Initial 64%, Current 41%  Pain reported has decreased from 2-8/10 to 2-3/10    ROM Initial (R) Initial (L) Current (R) Current (L)   PROM shld flex 51  160    PROM ER 0  80                         Strength       AROM Flex NT  95     AROM abd NT  95    AROM IR NT  L2           Strength   3- to 3/5 NT      Functional Activity Checklist: The patient continues to have moderate difficulty with the following:   [] Personal care  [x] Reaching / overhead  [] Standing    [x] Housework chores  [] Climbing  [] Driving / riding in a vehicle    [] Work  [] Squatting  [] Bed / vehicle mobility    [x] Lifting  [] Walking  [x] Sleeping    [x] Pushing / pulling  [] Sitting  [] Concentrating / reading     Specific Functional Improvement and Impression:  Reports pain level is less and able to wash and fix hair. Driving is getting easier. Reaching overhead and behind the back is getting easier, but still difficult. Main issue is strength R shld. Lately patient has been reporting numbness in index finger R hand on occasion. Summary:   [x] Patient is progressing as expected for this condition   [] Patient is progressing, but slower than expected for this condition   [] Patient is not progressing  Clinician Recommendations:   [x] Continue rehabilitation due to objective improvement and continued functional deficits. [] Follow up periodically to advance home exercise program to match level of function. [] Continue rehabitation due to objective improvement and continued functional deficits with progression to work conditioning.    [] Discharge to post-rehab program secondary to maximizing \"medical necessity\" of physical / occupational therapy.    [] Discharge independent in a home program as:  [] All goals achieved  [] Maximized \"medical necessity\" of physical / occupational therapy  [] No subjective or objective improvements    Plan: Cont PT 2x/week for 4-6 more weeks for progression of strength  Electronically signed by: Nettie Mederos PT   License #: [id number]  Physician Recommendations:  [] Follow treatment plan as above [] Discontinue physical therapy  [] Change plan to: _______________________________________________________________

## 2019-05-24 NOTE — FLOWSHEET NOTE
[facility],  [address] [place]  Phone: [phone]  Fax [phone]      Physical Therapy Daily Treatment Note  Date:  2019    Patient Name:  Prosper Rojas  \" Anjali\"  :  1945  MRN: [id number]  Restrictions/Precautions:    Medical/Treatment Diagnosis Information:  · Diagnosis: M75.121 tear of R Rotator cuff, s/p R RCR, SAD, long head of biceps tenodesis on 19  · Treatment Diagnosis: M75.121 tear of R Rotator cuff, R shld pain M25.511D, s/p R RCR, SAD, long head of biceps tenodesis on 3/0/52  Insurance/Certification information:  PT Insurance Information: / AARP  Physician Information:  Referring Practitioner: Dr. Ari Begum of care signed (Y/N):     Date of Patient follow up with Physician: end of May. G-Code (if applicable):      Date G-Code Applied:       MEDICARE CAP EXCEPTION DOCUMENTATION      I certify that this patient meets one of the below criteria necessary for becoming an exception to the Medicare cap on therapy services:    [x]  The patient has a condition identified by an ICD-10 code that has a direct and significant impact on the need for therapy. (Significantly impacts the rate of recovery.)                   []  The patient has a complexity identified by an ICD-10 code that has a direct and significant impact on the need for therapy. (Significantly impacts the rate of recovery and is associated with a primary condition.)         []  The patient has associated variables that influence the amount of treatment to include:  Social support, self-efficacy/motivation, prognosis, time since onset/acuity. []  The patient has generalized musculoskeletal conditions or a condition affecting multiple sites that will have a direct impact on the rate of recovery. [x]  The patient had a prior episode of outpatient therapy during this calendar year for a different condition. []  The patient has a mental or cognitive disorder in addition to the condition being treated that will have a direct and significant impact on the rate of recovery. Progress Note: []  Yes  [x]  No  Next due by: Visit #10      Latex Allergy:  [x]NO      []YES  Preferred Language for Healthcare:   [x]English       []other:    Visit # Insurance Allowable Requires auth   17 [facility]/AARP    []no        []yes:   15 weeks post-op 5/21/19  Pain level:  2-3/10     Height: 5' 5\", Weight: 135lbs. SUBJECTIVE:  Shoulder is improving. Only issue is lately with supine ER stretch in supine causes numbness in index finger      OBJECTIVE: See MD note for status. Progressed HEP this visit. Provided Education in post-operative precautions/contraindications. Flex with cane 145 this visit. Observation:   Test measurements: On  Flex:160, ER:80. 161 flex AAROM with cane. PROM Flex: 161    RESTRICTIONS/PRECAUTIONS:  Abduction pillow /sling. Massive RCR protocol ( RCR, SAD and bicep tenodesis 2/6/19)   Post-op note. 1. Superior Labrum/Biceps Tendon. Extensive tearing of the long head biceps tendon. Intact superior labrum   2. Anterior/Posterior Labrum- Intact without fraying  3. Rotator Cuff- There was a full thickness tear of the supraspinatus tendon and anterior 50% of the infraspinatus tendon. There was a full thickness tear of the upper 50% of the subscapularis tendon. 4. Inferior Axillary Recess. No loose bodies  5. Articular Surfaces. Intact          Exercises/Interventions: Held strengthening exercises this visit due to increase soreness today.   Therapeutic Ex Sets/rep comments   FAV 4 10 x10 reps HEP   Flex/scap table slides H10 x10 + scap to HEP   Cross arm UE stretch in supine H30 x5 reps    SB flexion slides/scaption slides     Ext/Abd isometrics  + to HEP   Supine Er with 1.5 wt 3'     ER with cane   HEP     punch without assist and BP with hands clasped  SL Er SL abd with elbow straight 2 x10 reps each SL scap retraction H5 2x 10 reps    Pulleys 20 reps    Wall push-ups x10 repsWall slides- straight/ X pattern  10 reps eaHEP   BP with cane into shld flex stretches with bridges H10 x10 reps    Supine 90/90 A/P , M/L  Tricep skull  2# 3 x 10   Serratus anterior 2#  H5 3x10 reps   Gentle post capsule (s)  Gentle sleeper (s) in SLH30 x 3 reps HEP   No money YTB H5 1x10  HEP   IR assist with strap H10 x 10 reps    IR TB ROM assist with hip movements H10 x10 reps    TB Rows (RTB) H5 3 x 10 reps Rows HEP   TB overhead flex eccentrics walkouts H5 x10 reps    TB ER/IR H5 2x10 reps+ to HEPProne shld ext 1#  2x 10 repsBiceps 2# 2x10 reps Walkouts ER YTB H5 x 10 reps HEPManual Intervention     PROM R shld/ [address] 1-2 mobs  Scope hole massage 15'                                  NMR re-education             The finisher  With ATC                                Therapeutic Exercise and NMR EXR  [x] (42664) Provided verbal/tactile cueing for activities related to strengthening, flexibility, endurance, ROM  for improvements in scapular, scapulothoracic and UE control with self care, reaching, carrying, lifting, house/yardwork, driving/computer work.    [] (36350) Provided verbal/tactile cueing for activities related to improving balance, coordination, kinesthetic sense, posture, motor skill, proprioception  to assist with  scapular, scapulothoracic and UE control with self care, reaching, carrying, lifting, house/yardwork, driving/computer work. Therapeutic Activities:    [] (31722 or 88477) Provided verbal/tactile cueing for activities related to improving balance, coordination, kinesthetic sense, posture, motor skill, proprioception and motor activation to allow for proper function of scapular, scapulothoracic and UE control with self care, carrying, lifting, driving/computer work.      Home Exercise Program:    [x] (62617) Reviewed/Progressed HEP activities related to strengthening, flexibility, endurance, ROM of scapular, scapulothoracic and UE control with self care, reaching, carrying, lifting, house/yardwork, driving/computer work  [] (92889) Reviewed/Progressed HEP activities related to improving balance, coordination, kinesthetic sense, posture, motor skill, proprioception of scapular, scapulothoracic and UE control with self care, reaching, carrying, lifting, house/yardwork, driving/computer work      Manual Treatments:  PROM / STM / Oscillations-Mobs:  G-I, II, III, IV (PA's, Inf., Post.)  [x] (67957) Provided manual therapy to mobilize soft tissue/joints of cervical/CT, scapular GHJ and UE for the purpose of modulating pain, promoting relaxation,  increasing ROM, reducing/eliminating soft tissue swelling/inflammation/restriction, improving soft tissue extensibility and allowing for proper ROM for normal function with self care, reaching, carrying, lifting, house/yardwork, driving/computer work    Modalities: ES with CP to R shld x 15 min after treatment    Charges:  Timed Code Treatment Minutes: 50'   Total Treatment Minutes: 80' (ATC +modality)     [] EVAL (LOW) 03607 (typically 20 minutes face-to-face)  [x] EVAL (MOD) 35199 (typically 30 minutes face-to-face)  [] EVAL (HIGH) 23201 (typically 45 minutes face-to-face)  [x] RE-EVAL     [x] IQ(40527) x  2   [] IONTO  [x] NMR (97553) x  1   [] VASO  [x] Manual (66093) x  1    [] Other:  [] TA x       [] Mech Traction (96888)  [] ES(attended) (89855)      [x] ES (un) (32864):     GOALS:  Patient stated goal: Decrease pain     Therapist goals for Patient:   Short Term Goals: To be achieved in: 2 weeks  1. Independent in HEP and progression per patient tolerance, in order to prevent re-injury. 2. Patient will have a decrease in pain to facilitate improvement in movement, function, and ADLs as indicated by Functional Deficits.     Long Term Goals: To be achieved in: 12 weeks  1.  Disability index score of 35% or less for the [facility] to assist with reaching prior level of function. 2. Patient will demonstrate increased AROM to WNL without pain to allow for proper joint functioning as indicated by patients Functional Deficits. 3. Patient will demonstrate an increase in Strength to 3+//5 to allow for proper functional mobility as indicated by patients Functional Deficits. 4. Patient will return to reaching overhead to fix and dry hair functional activities without increased symptoms or restriction. 5. Patient to be able to open a jar without shld pain and weakness(patient specific functional goal)               Progression Towards Functional goals:  [x] Patient is progressing as expected towards functional goals listed. [] Progression is slowed due to complexities listed. [] Progression has been slowed due to co-morbidities. [] Plan just implemented, too soon to assess goals progression  [] Other:     ASSESSMENT: Able to tolerate strengthening exercises with less pain this visit. AROM/PROM improving. Strength deficits still noted. Treatment/Activity Tolerance:  [x] Patient tolerated treatment well [] Patient limited by fatique  [] Patient limited by pain  [] Patient limited by other medical complications  [] Other:     Prognosis: [x] Good [] Fair  [] Poor    Patient Requires Follow-up: [x] Yes  [] No    PLAN: Cont PT.2x/week. Progress as tolerated.   [x] Continue per plan of care [] Alter current plan (see comments)  [] Plan of care initiated [] Hold pending MD visit [] Discharge    Electronically signed by: Rhea Lucio, [address] [facility]

## 2019-05-24 NOTE — FLOWSHEET NOTE
[facility], [address] [place]  Phone: [phone]  Fax [phone]    ATHLETIC TRAINING [address]  Date:  2019    Patient Name:  Neisha Stanford  \"Anjali\"  :  1945  MRN: [id number]  Restrictions/Precautions:    Medical/Treatment Diagnosis Information:  ·  R RTCR, SAD, long head of biceps tenodesis     Physician Information:   Favorito      Weeks Post-op  2wks    4 wks 6 wks 8 wks   3wks 6 wks 9 wks 12 wks   19                     Activity Log                                                          DOS/DOI: 19                                                         Date: 19   ATC Communication                  OVL Pullbacks x10          True Stretch B arms in FF, 3D hip drives w/manual scap loading  R arm in scap on 3rd bar, L lat hip drives B arms in FF, 3D hip drives w/manual scap loading  R arm in scap on 3rd bar, L lat hip drives 33-88S ea B arms in FF, 3D hip drives w/manual scap loading  R arm in scap on 3rd bar, L lat hip drives 43-44C ea  Pec stretch (low) 15x5\" B arms in FF, 3D hip drives w/manual scap loading  R arm in scap on 3rd bar, L lat hip drives 25-77I ea  Pec stretch (low) 15x5\"          Finisher 2# push/pull, ladder, scaption, circles x10             Plyoback      Chop                            Chest                            ER Flip              Long/Short lever  Flex.   Low Trap Raise 15x                                  Scap.                                    ABd.               punch              Body/Cardio Blade Flex/Ext                                       IR/ER                                       ABd/ADd              Push-up      Plus                              Wall                            Table                           Floor              Ball on Wall                   Tramp              Theraband    Rows/Ext  YTB Ecc 15x YTB ext 2x10                          IR                             ER                             No money                             Horiz.  ABd                             Biceps                             Triceps  YTB Ecc 15x                           Punches              Cable Column   Rows Seated 3pl 2x10  15# (B) 2x10 15# 3x10                              Ext.    15# 2x10                              Lat. Pulldown                                  Biceps                                  Triceps    15# 2x10          Modality PM/CP 15' PM/CP 15' PM/CP 15' PM/CP 13'   Initials DTM EP JLW DTM   Time spent one on one (workers comp)       Time spent with PT assistant

## 2019-05-29 ENCOUNTER — HOSPITAL ENCOUNTER (OUTPATIENT)
Dept: PHYSICAL THERAPY | Age: 74
Setting detail: THERAPIES SERIES
Discharge: HOME | End: 2019-05-29
Payer: MEDICARE

## 2019-05-29 PROCEDURE — G0283 ELEC STIM OTHER THAN WOUND: HCPCS | Performed by: PHYSICAL THERAPIST

## 2019-05-29 PROCEDURE — 97110 THERAPEUTIC EXERCISES: CPT | Performed by: PHYSICAL THERAPIST

## 2019-05-29 PROCEDURE — 97140 MANUAL THERAPY 1/> REGIONS: CPT | Performed by: PHYSICAL THERAPIST

## 2019-05-29 PROCEDURE — 97112 NEUROMUSCULAR REEDUCATION: CPT | Performed by: PHYSICAL THERAPIST

## 2019-05-29 NOTE — FLOWSHEET NOTE
[facility], [address] [place]  Phone: [phone]  Fax [phone]    ATHLETIC TRAINING [address]  Date:  2019    Patient Name:  Gregor Lee  \"Anjali\"  :  1945  MRN: [id number]  Restrictions/Precautions:    Medical/Treatment Diagnosis Information:  ·  R RTCR, SAD, long head of biceps tenodesis     Physician Information:   Favorito      Weeks Post-op  2wks    4 wks 6 wks 8 wks   3wks 6 wks 9 wks 12 wks   19                     Activity Log                                                          DOS/DOI: 19                                                         Date: 19   ATC Communication                 OVL Pullbacks x10 OVL Pullbacks x10          True Stretch B arms in FF, 3D hip drives w/manual scap loading  R arm in scap on 3rd bar, L lat hip drives 59-61D ea B arms in FF, 3D hip drives w/manual scap loading  R arm in scap on 3rd bar, L lat hip drives 03-59I ea  Pec stretch (low) 15x5\" B arms in FF, 3D hip drives w/manual scap loading  R arm in scap on 3rd bar, L lat hip drives 75-81W ea  Pec stretch (low) 15x5\" B arms in FF, 3D hip drives  R arm in scap on 3rd bar, L lat hip drives 32-54H ea  Pec stretch (low) 15x5\"  IR stretch w/R/L lat hip drives, mini squats 85M          Finisher              Plyoback      Chop                            Chest                            ER Flip              Long/Short lever  Flex.  Low Trap Raise 15x                                   Scap.                                    ABd.               punch    YTB at 1/2 wall 15x3\"          Body/Cardio Blade Flex/Ext                                       IR/ER                                       ABd/ADd              Push-up      Plus                              Wall                            Table                           Floor              Ball on Wall

## 2019-05-29 NOTE — FLOWSHEET NOTE
[facility], [address]  Phone: [phone]  Fax [phone]      Physical Therapy Daily Treatment Note  Date:  2019    Patient Name:  Patricia Elkins  \" Anjali\"  :  1945  MRN: [id number]  Restrictions/Precautions:    Medical/Treatment Diagnosis Information:  · Diagnosis: M75.121 tear of R Rotator cuff, s/p R RCR, SAD, long head of biceps tenodesis on 19  · Treatment Diagnosis: M75.121 tear of R Rotator cuff, R shld pain M25.511D, s/p R RCR, SAD, long head of biceps tenodesis on 96  Insurance/Certification information:  [facility]  Physician Information:  Referring Practitioner: Dr. John Mc of care signed (Y/N):     Date of Patient follow up with Physician: F/U in 4 months. G-Code (if applicable):      Date G-Code Applied:       MEDICARE CAP EXCEPTION DOCUMENTATION      I certify that this patient meets one of the below criteria necessary for becoming an exception to the Medicare cap on therapy services:    [x]  The patient has a condition identified by an ICD-10 code that has a direct and significant impact on the need for therapy. (Significantly impacts the rate of recovery.)                   []  The patient has a complexity identified by an ICD-10 code that has a direct and significant impact on the need for therapy. (Significantly impacts the rate of recovery and is associated with a primary condition.)         []  The patient has associated variables that influence the amount of treatment to include:  Social support, self-efficacy/motivation, prognosis, time since onset/acuity. []  The patient has generalized musculoskeletal conditions or a condition affecting multiple sites that will have a direct impact on the rate of recovery. [x]  The patient had a prior episode of outpatient therapy during this calendar year for a different condition. []  The patient has a mental or cognitive disorder in addition to the condition being treated that will have a direct and significant impact on the rate of recovery. Progress Note: []  Yes  [x]  No  Next due by: Visit #10      Latex Allergy:  [x]NO      []YES  Preferred Language for Healthcare:   [x]English       []other:    Visit # Insurance Allowable Requires auth   18 [facility]/AARP    []no        []yes:   15 weeks post-op 5/21/19  Pain level:  2-3/10     Height: 5' 5\", Weight: 135lbs. SUBJECTIVE:  Shoulder is improving. Saw MD and pleased with progress. To continue with PT. Still has numbness in index finger which gets worse with Er supine stretch ,so has held exercise. OBJECTIVE:  Progressed HEP this visit. Reviewed and modified HEP. Provided Education in post-operative precautions/contraindications. Flex with cane 145 this visit. Observation:   Test measurements: On  Flex:160, ER:80. 161 flex AAROM with cane. PROM Flex: 161    RESTRICTIONS/PRECAUTIONS:  Abduction pillow /sling. Massive RCR protocol ( RCR, SAD and bicep tenodesis 2/6/19)   Post-op note. 1. Superior Labrum/Biceps Tendon. Extensive tearing of the long head biceps tendon. Intact superior labrum   2. Anterior/Posterior Labrum- Intact without fraying  3. Rotator Cuff- There was a full thickness tear of the supraspinatus tendon and anterior 50% of the infraspinatus tendon. There was a full thickness tear of the upper 50% of the subscapularis tendon. 4. Inferior Axillary Recess. No loose bodies  5. Articular Surfaces. Intact          Exercises/Interventions: Held strengthening exercises this visit due to increase soreness today.   Therapeutic Ex Sets/rep comments   FAV 4 10 x10 reps HEP   Flex/scap table slides H10 x10 + scap to HEP   Cross arm UE stretch in supine H30 x5 reps    SB flexion slides/scaption slides     Ext/Abd isometrics  + to HEP   Supine Er with 1.5 wt 3'     ER with cane   HEP     punch without assist and BP with hands clasped  SL Er SL abd with elbow straight 2 x10 reps each    SL scap retraction H5 2x 10 reps    Pulleys 20 reps    Wall push-ups 2x10 repsWall slides- straight/ X pattern  10 reps eaHEP   BP with cane into shld flex stretches with bridges H10 x10 reps    Supine 90/90 A/P , M/L  Tricep skull  2# 3 x 10   Serratus anterior 2#  H5 3x10 reps   Gentle post capsule (s)  Gentle sleeper (s) in SLH30 x 3 reps HEP   No money YTB H5 1x10  HEP   IR assist with strap H10 x 10 reps    IR TB ROM assist with hip movements    TB Rows (RTB) Rows HEP   TB overhead flex eccentrics walkouts    TB ER/IR H5 2x10 reps+ to HEPProne shld ext 1#  2x 10 repsBiceps 2# 2x10 reps AROM flex/abd in standing 5 reps ~80 before comp shled hiking Manual Intervention     PROM R shld/ [address] 1-2 mobs  Scope hole massage 15'                                  NMR re-education             The finisher  With ATC                                Therapeutic Exercise and NMR EXR  [x] (94581) Provided verbal/tactile cueing for activities related to strengthening, flexibility, endurance, ROM  for improvements in scapular, scapulothoracic and UE control with self care, reaching, carrying, lifting, house/yardwork, driving/computer work.    [] (41123) Provided verbal/tactile cueing for activities related to improving balance, coordination, kinesthetic sense, posture, motor skill, proprioception  to assist with  scapular, scapulothoracic and UE control with self care, reaching, carrying, lifting, house/yardwork, driving/computer work. Therapeutic Activities:    [] (73023 or 54169) Provided verbal/tactile cueing for activities related to improving balance, coordination, kinesthetic sense, posture, motor skill, proprioception and motor activation to allow for proper function of scapular, scapulothoracic and UE control with self care, carrying, lifting, driving/computer work.      Home Exercise Program:    [x] (08231) Reviewed/Progressed HEP activities related to strengthening, flexibility, endurance, ROM of scapular, scapulothoracic and UE control with self care, reaching, carrying, lifting, house/yardwork, driving/computer work  [] (14931) Reviewed/Progressed HEP activities related to improving balance, coordination, kinesthetic sense, posture, motor skill, proprioception of scapular, scapulothoracic and UE control with self care, reaching, carrying, lifting, house/yardwork, driving/computer work      Manual Treatments:  PROM / STM / Oscillations-Mobs:  G-I, II, III, IV (PA's, Inf., Post.)  [x] (40610) Provided manual therapy to mobilize soft tissue/joints of cervical/CT, scapular GHJ and UE for the purpose of modulating pain, promoting relaxation,  increasing ROM, reducing/eliminating soft tissue swelling/inflammation/restriction, improving soft tissue extensibility and allowing for proper ROM for normal function with self care, reaching, carrying, lifting, house/yardwork, driving/computer work    Modalities: ES with CP to R shld x 15 min after treatment    Charges:  Timed Code Treatment Minutes: 50'   Total Treatment Minutes: 80' (ATC +modality)     [] EVAL (LOW) 32493 (typically 20 minutes face-to-face)  [x] EVAL (MOD) 13925 (typically 30 minutes face-to-face)  [] EVAL (HIGH) 80896 (typically 45 minutes face-to-face)  [x] RE-EVAL     [x] HR(58530) x  1   [] IONTO  [x] NMR (19275) x  1   [] VASO  [x] Manual (99728) x  1    [] Other:  [] TA x       [] Mech Traction (54088)  [] ES(attended) (80316)      [x] ES (un) (05334):     GOALS:  Patient stated goal: Decrease pain     Therapist goals for Patient:   Short Term Goals: To be achieved in: 2 weeks  1. Independent in HEP and progression per patient tolerance, in order to prevent re-injury. 2. Patient will have a decrease in pain to facilitate improvement in movement, function, and ADLs as indicated by Functional Deficits.     Long Term Goals: To be achieved in: 12 weeks  1.  Disability index score of 35% or less for the [facility] to assist with reaching prior level of function. 2. Patient will demonstrate increased AROM to WNL without pain to allow for proper joint functioning as indicated by patients Functional Deficits. 3. Patient will demonstrate an increase in Strength to 3+//5 to allow for proper functional mobility as indicated by patients Functional Deficits. 4. Patient will return to reaching overhead to fix and dry hair functional activities without increased symptoms or restriction. 5. Patient to be able to open a jar without shld pain and weakness(patient specific functional goal)               Progression Towards Functional goals:  [x] Patient is progressing as expected towards functional goals listed. [] Progression is slowed due to complexities listed. [] Progression has been slowed due to co-morbidities. [] Plan just implemented, too soon to assess goals progression  [] Other:     ASSESSMENT: Able to tolerate strengthening exercises with less pain this visit. AROM/PROM improving. Strength deficits still noted. Treatment/Activity Tolerance:  [x] Patient tolerated treatment well [] Patient limited by fatique  [] Patient limited by pain  [] Patient limited by other medical complications  [] Other:     Prognosis: [x] Good [] Fair  [] Poor    Patient Requires Follow-up: [x] Yes  [] No    PLAN: Cont PT.2x/week. Progress as tolerated.   [x] Continue per plan of care [] Alter current plan (see comments)  [] Plan of care initiated [] Hold pending MD visit [] Discharge    Electronically signed by: Leelee Nagel

## 2019-05-31 ENCOUNTER — HOSPITAL ENCOUNTER (OUTPATIENT)
Dept: PHYSICAL THERAPY | Age: 74
Setting detail: THERAPIES SERIES
Discharge: HOME | End: 2019-05-31
Payer: MEDICARE

## 2019-05-31 PROCEDURE — G0283 ELEC STIM OTHER THAN WOUND: HCPCS | Performed by: PHYSICAL THERAPIST

## 2019-05-31 PROCEDURE — 97140 MANUAL THERAPY 1/> REGIONS: CPT | Performed by: PHYSICAL THERAPIST

## 2019-05-31 PROCEDURE — 97112 NEUROMUSCULAR REEDUCATION: CPT | Performed by: PHYSICAL THERAPIST

## 2019-05-31 PROCEDURE — 97110 THERAPEUTIC EXERCISES: CPT | Performed by: PHYSICAL THERAPIST

## 2019-05-31 NOTE — FLOWSHEET NOTE
[facility], [address] [place]  Phone: [phone]  Fax [phone]      Physical Therapy Daily Treatment Note  Date:  2019    Patient Name:  Prosper Rojas  \" Anjali\"  :  1945  MRN: [id number]  Restrictions/Precautions:    Medical/Treatment Diagnosis Information:  · Diagnosis: M75.121 tear of R Rotator cuff, s/p R RCR, SAD, long head of biceps tenodesis on 19  · Treatment Diagnosis: M75.121 tear of R Rotator cuff, R shld pain M25.511D, s/p R RCR, SAD, long head of biceps tenodesis on 54  Insurance/Certification information:  PT Insurance Information: / [employer]  Physician Information:  Referring Practitioner: Dr. Ari Begum of care signed (Y/N):     Date of Patient follow up with Physician: F/U in 4 months. G-Code (if applicable):      Date G-Code Applied:       MEDICARE CAP EXCEPTION DOCUMENTATION      I certify that this patient meets one of the below criteria necessary for becoming an exception to the Medicare cap on therapy services:    [x]  The patient has a condition identified by an ICD-10 code that has a direct and significant impact on the need for therapy. (Significantly impacts the rate of recovery.)                   []  The patient has a complexity identified by an ICD-10 code that has a direct and significant impact on the need for therapy. (Significantly impacts the rate of recovery and is associated with a primary condition.)         []  The patient has associated variables that influence the amount of treatment to include:  Social support, self-efficacy/motivation, prognosis, time since onset/acuity. []  The patient has generalized musculoskeletal conditions or a condition affecting multiple sites that will have a direct impact on the rate of recovery. [x]  The patient had a prior episode of outpatient therapy during this calendar year for a different condition. clasped  SL Er H5 x15 reps SL abd with elbow straight 3 x10 reps each    SL scap retraction     Pulleys 20 reps    Wall push-ups 2x10 repsWall slides- straight/ X pattern  10 reps eaHEP   BP with cane into shld flex stretches with bridges H10 x10 reps    Supine 90/90 A/P , M/L  Tricep skull  2# 3 x 10   Supine with OVL flexion H5 2x10 reps   Serratus anterior    Gentle post capsule (s)  Gentle sleeper (s) in SLH30 x 3 reps HEP   No money YTB   HEP   IR assist with strap     IR TB ROM assist with hip movements H10 x10 reps   TB Rows (RTB) Rows HEP   TB overhead flex eccentrics walkouts H5 x10 reps   SA with band GTB in standing H5 1x10 repsSlight pain with exerciseTB ER/IR H5 2x10 reps+ to HEPProne shld ext 1#  2x 10 repsBiceps 2# 2x10 reps AROM flex/abd in standing 10 reps ~80 before comp shled hiking Manual Intervention     PROM R shld/ [address] 1-2 mobs  Scope hole massage 15'                                  NMR re-education             The finisher  With ATC        Ball on wall  2 reps of fatigue                       Therapeutic Exercise and NMR EXR  [x] (95934) Provided verbal/tactile cueing for activities related to strengthening, flexibility, endurance, ROM  for improvements in scapular, scapulothoracic and UE control with self care, reaching, carrying, lifting, house/yardwork, driving/computer work.    [] (43518) Provided verbal/tactile cueing for activities related to improving balance, coordination, kinesthetic sense, posture, motor skill, proprioception  to assist with  scapular, scapulothoracic and UE control with self care, reaching, carrying, lifting, house/yardwork, driving/computer work.     Therapeutic Activities:    [] (04856 or 60506) Provided verbal/tactile cueing for activities related to improving balance, coordination, kinesthetic sense, posture, motor skill, proprioception and motor activation to allow for proper function of scapular, scapulothoracic and UE control with self care, carrying, lifting, driving/computer work. Home Exercise Program:    [x] (80308) Reviewed/Progressed HEP activities related to strengthening, flexibility, endurance, ROM of scapular, scapulothoracic and UE control with self care, reaching, carrying, lifting, house/yardwork, driving/computer work  [] (40995) Reviewed/Progressed HEP activities related to improving balance, coordination, kinesthetic sense, posture, motor skill, proprioception of scapular, scapulothoracic and UE control with self care, reaching, carrying, lifting, house/yardwork, driving/computer work      Manual Treatments:  PROM / STM / Oscillations-Mobs:  G-I, II, III, IV (PA's, Inf., Post.)  [x] (78667) Provided manual therapy to mobilize soft tissue/joints of cervical/CT, scapular GHJ and UE for the purpose of modulating pain, promoting relaxation,  increasing ROM, reducing/eliminating soft tissue swelling/inflammation/restriction, improving soft tissue extensibility and allowing for proper ROM for normal function with self care, reaching, carrying, lifting, house/yardwork, driving/computer work    Modalities: ES with CP to R shld x 15 min after treatment    Charges:  Timed Code Treatment Minutes: 50'   Total Treatment Minutes: 80' (ATC +modality)     [] EVAL (LOW) 69062 (typically 20 minutes face-to-face)  [x] EVAL (MOD) 14515 (typically 30 minutes face-to-face)  [] EVAL (HIGH) 03719 (typically 45 minutes face-to-face)  [x] RE-EVAL     [x] QE(97544) x  1   [] IONTO  [x] NMR (06854) x  1   [] VASO  [x] Manual (01715) x  1    [] Other:  [] TA x       [] Mech Traction (08669)  [] ES(attended) (99429)      [x] ES (un) (49150):     GOALS:  Patient stated goal: Decrease pain     Therapist goals for Patient:   Short Term Goals: To be achieved in: 2 weeks  1. Independent in HEP and progression per patient tolerance, in order to prevent re-injury.    2. Patient will have a decrease in pain to facilitate improvement in movement, function, and ADLs as indicated by Functional Deficits.     Long Term Goals: To be achieved in: 12 weeks  1. Disability index score of 35% or less for the [facility] to assist with reaching prior level of function. 2. Patient will demonstrate increased AROM to WNL without pain to allow for proper joint functioning as indicated by patients Functional Deficits. 3. Patient will demonstrate an increase in Strength to 3+//5 to allow for proper functional mobility as indicated by patients Functional Deficits. 4. Patient will return to reaching overhead to fix and dry hair functional activities without increased symptoms or restriction. 5. Patient to be able to open a jar without shld pain and weakness(patient specific functional goal)               Progression Towards Functional goals:  [x] Patient is progressing as expected towards functional goals listed. [] Progression is slowed due to complexities listed. [] Progression has been slowed due to co-morbidities. [] Plan just implemented, too soon to assess goals progression  [] Other:     ASSESSMENT: Able to tolerate strengthening exercises with less pain this visit. AROM/PROM improving. Strength deficits still noted. Treatment/Activity Tolerance:  [x] Patient tolerated treatment well [] Patient limited by fatique  [] Patient limited by pain  [] Patient limited by other medical complications  [] Other:     Prognosis: [x] Good [] Fair  [] Poor    Patient Requires Follow-up: [x] Yes  [] No    PLAN: Cont PT.2x/week. Progress as tolerated.   [x] Continue per plan of care [] Alter current plan (see comments)  [] Plan of care initiated [] Hold pending MD visit [] Discharge    Electronically signed by: Genoveva Peralta, [address] [facility]

## 2019-05-31 NOTE — FLOWSHEET NOTE
Push-up      Plus                              Wall                            Table                           Floor              Ball on Wall                   Tramp              Theraband    Rows/Ext YTB ext 2x10                            IR                             ER                             No money                             Horiz.  ABd    YTB 10x3\" low                         Biceps                             Triceps                             Punches              Cable Column   Rows 15# (B) 2x10 15# 3x10 15# (B) 3x10 IH 10# 2x10                              Ext.  15# 2x10 15# (B) 2x10                               Lat. Pulldown   25# (B) 2x10 IH 25# 3x10                              Biceps                                  Triceps  15# 2x10 15# (B) 3x10           Modality PM/CP 13' PM/CP 15' PM/CP 15' PM/CP 13'   Initials JLW DTM JLW DB   Time spent one on one (workers comp)       Time spent with PT assistant

## 2019-06-03 ENCOUNTER — HOSPITAL ENCOUNTER (OUTPATIENT)
Dept: PHYSICAL THERAPY | Age: 74
Setting detail: THERAPIES SERIES
Discharge: HOME | End: 2019-06-03
Payer: MEDICARE

## 2019-06-03 PROCEDURE — 97140 MANUAL THERAPY 1/> REGIONS: CPT | Performed by: PHYSICAL THERAPIST

## 2019-06-03 PROCEDURE — 97112 NEUROMUSCULAR REEDUCATION: CPT | Performed by: PHYSICAL THERAPIST

## 2019-06-03 PROCEDURE — 97110 THERAPEUTIC EXERCISES: CPT | Performed by: PHYSICAL THERAPIST

## 2019-06-03 PROCEDURE — G0283 ELEC STIM OTHER THAN WOUND: HCPCS | Performed by: PHYSICAL THERAPIST

## 2019-06-03 NOTE — FLOWSHEET NOTE
[facility], [address] [place]  Phone: [phone]  Fax [phone]      Physical Therapy Daily Treatment Note  Date:  6/3/2019    Patient Name:  Codi Olsen  \" Anjali\"  :  1945  MRN: [id number]  Restrictions/Precautions:    Medical/Treatment Diagnosis Information:  · Diagnosis: M75.121 tear of R Rotator cuff, s/p R RCR, SAD, long head of biceps tenodesis on 19  · Treatment Diagnosis: M75.121 tear of R Rotator cuff, R shld pain M25.511D, s/p R RCR, SAD, long head of biceps tenodesis on 97  Insurance/Certification information:  [facility]  Physician Information:  Referring Practitioner: Dr. Odell Houston of care signed (Y/N):     Date of Patient follow up with Physician: F/U in 4 months. G-Code (if applicable):      Date G-Code Applied:       MEDICARE CAP EXCEPTION DOCUMENTATION      I certify that this patient meets one of the below criteria necessary for becoming an exception to the Medicare cap on therapy services:    [x]  The patient has a condition identified by an ICD-10 code that has a direct and significant impact on the need for therapy. (Significantly impacts the rate of recovery.)                   []  The patient has a complexity identified by an ICD-10 code that has a direct and significant impact on the need for therapy. (Significantly impacts the rate of recovery and is associated with a primary condition.)         []  The patient has associated variables that influence the amount of treatment to include:  Social support, self-efficacy/motivation, prognosis, time since onset/acuity. []  The patient has generalized musculoskeletal conditions or a condition affecting multiple sites that will have a direct impact on the rate of recovery. [x]  The patient had a prior episode of outpatient therapy during this calendar year for a different condition. each    SL scap retraction     Pulleys 20 reps    Wall push-ups 2x10 repsWall slides- straight/ X pattern  10 reps eaHEP   BP with cane into shld flex stretches with bridges H10 x10 reps    Supine 90/90 A/P , M/L  Tricep skull  2# 3 x 10   Supine with OVL flexion H5 2x10 reps   Serratus anterior 2#  H5 3x10 reps   Gentle post capsule (s)  Gentle sleeper (s) in SLH30 x 3 reps HEP   No money YTB   HEP   IR assist with strap     IR TB ROM assist with hip movements H10 x10 reps   TB Rows (RTB) Rows HEP   TB overhead flex eccentrics walkouts H5 x10 reps   SA with band GTB in standing H5 1x10 repsSlight pain with exerciseTB ER/IR H5 2x10 reps+ to HEPProne shld ext 1#  2x 10 repsBiceps 2# 2x10 reps      AROM flex/abd in standing 10 reps ~80 before comp shled hiking Manual Intervention     PROM R shld/ [address] 1-2 mobs  Scope hole massage 8'                                  NMR re-education             The finisher  With ATC        Ball on wall  2 reps of fatigue                       Therapeutic Exercise and NMR EXR  [x] (43628) Provided verbal/tactile cueing for activities related to strengthening, flexibility, endurance, ROM  for improvements in scapular, scapulothoracic and UE control with self care, reaching, carrying, lifting, house/yardwork, driving/computer work.    [] (36271) Provided verbal/tactile cueing for activities related to improving balance, coordination, kinesthetic sense, posture, motor skill, proprioception  to assist with  scapular, scapulothoracic and UE control with self care, reaching, carrying, lifting, house/yardwork, driving/computer work. Therapeutic Activities:    [] (44177 or 75731) Provided verbal/tactile cueing for activities related to improving balance, coordination, kinesthetic sense, posture, motor skill, proprioception and motor activation to allow for proper function of scapular, scapulothoracic and UE control with self care, carrying, lifting, driving/computer work.      Home Exercise Program:    [x] (61344) Reviewed/Progressed HEP activities related to strengthening, flexibility, endurance, ROM of scapular, scapulothoracic and UE control with self care, reaching, carrying, lifting, house/yardwork, driving/computer work  [] (19023) Reviewed/Progressed HEP activities related to improving balance, coordination, kinesthetic sense, posture, motor skill, proprioception of scapular, scapulothoracic and UE control with self care, reaching, carrying, lifting, house/yardwork, driving/computer work      Manual Treatments:  PROM / STM / Oscillations-Mobs:  G-I, II, III, IV (PA's, Inf., Post.)  [x] (88906) Provided manual therapy to mobilize soft tissue/joints of cervical/CT, scapular GHJ and UE for the purpose of modulating pain, promoting relaxation,  increasing ROM, reducing/eliminating soft tissue swelling/inflammation/restriction, improving soft tissue extensibility and allowing for proper ROM for normal function with self care, reaching, carrying, lifting, house/yardwork, driving/computer work    Modalities: ES with CP to R shld x 15 min after treatment    Charges:  Timed Code Treatment Minutes: 50'   Total Treatment Minutes: 80' (ATC +modality)     [] EVAL (LOW) 66496 (typically 20 minutes face-to-face)  [x] EVAL (MOD) 07349 (typically 30 minutes face-to-face)  [] EVAL (HIGH) 61455 (typically 45 minutes face-to-face)  [x] RE-EVAL     [x] OO(59473) x  1   [] IONTO  [x] NMR (43460) x  1   [] VASO  [x] Manual (06152) x  1    [] Other:  [] TA x       [] Mech Traction (10021)  [] ES(attended) (25664)      [x] ES (un) (46817):     GOALS:  Patient stated goal: Decrease pain     Therapist goals for Patient:   Short Term Goals: To be achieved in: 2 weeks  1. Independent in HEP and progression per patient tolerance, in order to prevent re-injury.    2. Patient will have a decrease in pain to facilitate improvement in movement, function, and ADLs as indicated by Functional Deficits.     Long Term Goals: To be achieved in: 12 weeks  1. Disability index score of 35% or less for the [facility] to assist with reaching prior level of function. 2. Patient will demonstrate increased AROM to WNL without pain to allow for proper joint functioning as indicated by patients Functional Deficits. 3. Patient will demonstrate an increase in Strength to 3+//5 to allow for proper functional mobility as indicated by patients Functional Deficits. 4. Patient will return to reaching overhead to fix and dry hair functional activities without increased symptoms or restriction. 5. Patient to be able to open a jar without shld pain and weakness(patient specific functional goal)               Progression Towards Functional goals:  [x] Patient is progressing as expected towards functional goals listed. [] Progression is slowed due to complexities listed. [] Progression has been slowed due to co-morbidities. [] Plan just implemented, too soon to assess goals progression  [] Other:     ASSESSMENT: Able to tolerate strengthening exercises with less pain this visit. AROM/PROM improving. Strength deficits still noted. Treatment/Activity Tolerance:  [x] Patient tolerated treatment well [] Patient limited by fatique  [] Patient limited by pain  [] Patient limited by other medical complications  [] Other:     Prognosis: [x] Good [] Fair  [] Poor    Patient Requires Follow-up: [x] Yes  [] No    PLAN: Cont PT.2x/week. Progress as tolerated.   [x] Continue per plan of care [] Alter current plan (see comments)  [] Plan of care initiated [] Hold pending MD visit [] Discharge    Electronically signed by: Leelee Buenrostro [facility]

## 2019-06-03 NOTE — PLAN OF CARE
[facility], [address] [place]  Phone: [phone]  Fax [phone]    Physical Therapy Recertification  Date: 6/3/2019        Patient Name:  Jaqueline Gong    :  1945  MRN: [id number]  Referring Physician: Prakash Herrera  Diagnosis: L rotator cuff tear                      ICD Code: M75.121  Therapy Diagnosis/Practice Pattern: L shoulder pain s/p RCR/ SAD/ long head biceps tenodesis      Total number of visits: 20  Reporting Period:   Beginning Date:2018   End Date:6/3/19    OBJECTIVE  Test used Initial score Current Score   Pain Summary 0-10 2-8 2-3   Functional questionnaire Quick DASH 64% 27%   Functional Testing            ROM Passive flex/ER 51/0 160/80    Active abd NT 95    Active flex     Strength Flex  >3 per AROM    AROM IR NT L1        Test/tests used to determine % limitation: Q DASH  Actual Score used to drive % limitation: 34%    Treatment to date:  [x] Therapeutic Exercise    [] Modalities:  [] Therapeutic Activity             []Ultrasound            [x]Electrical Stimulation  [] Gait Training     []Cervical Traction    [] Lumbar Traction  [x] Neuromuscular Re-education [x] Cold/hotpack         []Iontophoresis  [x] Instruction in HEP      Other:  [x] Manual Therapy                   []                       ?           []   Assessment:  [x] Improvement noted relative to goals: Patient's response to treatment/Additional assessment: Pt perceives 75% improvement in overall condition. Notes improvement in ROM/ strength overall, with decreasing pain levels. [] No Improvement noted related to goals:/Patient's response to treatment/Additional assessment: Pt perceives 50% improvement in overall condition. Notes improvement in ROM/ strength overall, with decreasing pain levels.       New or Updated Goals (if applicable):  [x] No change to goals established upon initial eval/last progress note:  New Goals:    Rehab Potential:   []Excellent   [x] Good   [] Fair   [] Poor    Plan of Care:  [x] Continue Therapy    Frequency/Duration:  # Days per week: [x] 1 day # Weeks: [] 1 week [] 7 weeks     [x] 2 days? [] 2 weeks [] 8 weeks     [] 3 days   [] 3 weeks [] 9 weeks     [] 4 days   [] 4 weeks [] 10 weeks      [] 5 days   [] 5 weeks [] 11 weeks          [x] 6 weeks [] 12 weeks    Interventions:  [x] Therapeutic Exercise    [] Modalities:  [] Therapeutic Activity    [] Ultrasound  [x] Electrical Stimulation  [] Gait Training     [] Cervical Traction [] Lumbar Traction  [x] Neuromuscular Re-education [x] Cold/hotpack [] Iontophoresis   [x] Instruction in HEP     Other:  [x] Manual Therapy      []                     []           ?       Electronically signed by:  [place]  [id number]  If you have any questions or concerns, please don't hesitate to call. Thank you for the referral.  Medicare requires recertification of the therapy plan of care. Additional visits are being requested. Please recertify for additional visits:    Physician Signature:____________________________________Date:_______________  By signing above, therapist's plan is approved by the physician.

## 2019-06-06 ENCOUNTER — HOSPITAL ENCOUNTER (OUTPATIENT)
Dept: PHYSICAL THERAPY | Age: 74
Setting detail: THERAPIES SERIES
End: 2019-06-06
Payer: MEDICARE

## 2019-06-11 ENCOUNTER — HOSPITAL ENCOUNTER (OUTPATIENT)
Dept: PHYSICAL THERAPY | Age: 74
Setting detail: THERAPIES SERIES
Discharge: HOME | End: 2019-06-11
Payer: MEDICARE

## 2019-06-11 PROCEDURE — 97112 NEUROMUSCULAR REEDUCATION: CPT | Performed by: PHYSICAL THERAPIST

## 2019-06-11 PROCEDURE — 97140 MANUAL THERAPY 1/> REGIONS: CPT | Performed by: PHYSICAL THERAPIST

## 2019-06-11 PROCEDURE — G0283 ELEC STIM OTHER THAN WOUND: HCPCS | Performed by: PHYSICAL THERAPIST

## 2019-06-11 PROCEDURE — 97110 THERAPEUTIC EXERCISES: CPT | Performed by: PHYSICAL THERAPIST

## 2019-06-11 NOTE — FLOWSHEET NOTE
[facility],  [address] [place]  Phone: [phone]  Fax [phone]    ATHLETIC TRAINING [address]  Date:  2019    Patient Name:  Nadia Vaughan  \"Anjali\"  :  1945  MRN: [id number]  Restrictions/Precautions:    Medical/Treatment Diagnosis Information:  ·  R RTCR, SAD, long head of biceps tenodesis     Physician Information:   Favorito      Weeks Post-op  2wks    4 wks 6 wks 8 wks   3wks 6 wks 9 wks 12 wks   19                     Activity Log                                                          DOS/DOI: 19                                                         Date: 19   ATC Communication  90 A Flex, IR to T7  Focus scap dep  Fatigued w/ new RX  HEP practice scap depression w/ ADL  \"Feel like I've 'turned a corner'\"  Short on time today           OVL Pullbacks x10  YTB Pullbacks R/L 10x ea OTB R/L Pullbacks 10x          True Stretch B arms in FF, 3D hip drives  R arm in scap on 3rd bar, L lat hip drives 24-22K ea  Pec stretch (low) 15x5\"  IR stretch w/R/L lat hip drives, mini squats 05Y B arms in FF, 3D hip drives  R arm in scap on 3rd bar, L lat hip drives 26-14A ea  Pec stretch (low) 15x5\"  IR stretch w/R/L lat hip drives, mini squats 08Q            Finisher              Plyoback      Chop                            Chest                            ER Flip              Long/Short lever  Flex.    YTB w/ER wall climb 15x OTB w/ER wall climb 15x                                Scap.                                    ABd.               punch YTB at 1/2 wall 15x3\" YTB at 1/2 wall 10x3\" YTB at 1/2 wall 15x5\" OTB at 1/2 wall 4\" 15x3\"     Purple x15     Body/Cardio Blade Flex/Ext                                       IR/ER                                       ABd/ADd              Push-up      Plus                              Wall Table                           Floor              Ball on Wall   Towel 11/12/1 10x  Towel Clocks 7x Towel 11/12/1 10x  Towel Clocks 7x               Tramp              Theraband    Rows/Ext   Rows GTB Ecc 20x  Ext YTB Ecc 20x                          IR                             ER   YTB Ecc 20x                          No money                             Horiz.  ABd  YTB 10x3\" low                           Biceps                             Triceps   YTB Ecc 20x                          Punches              Cable Column   Rows 15# (B) 3x10 IH 10# 2x10  20# (B) 3x10                              Ext. 15# (B) 2x10                                 Lat. Pulldown 25# (B) 2x10 IH 25# 3x10  25# (B) 25x                              Biceps                                  Triceps 15# (B) 3x10             Modality PM/CP 13' PM/CP 15' PM/CP 15' PM/CP 13'   Initials JLW DB EP JLW   Time spent one on one (workers comp)       Time spent with PT assistant

## 2019-06-11 NOTE — FLOWSHEET NOTE
[facility], [address] [place]  Phone: [phone]  Fax [phone]      Physical Therapy Daily Treatment Note  Date:  2019    Patient Name:  Chaitanya Lepe  \" Anjali\"  :  1945  MRN: [id number]  Restrictions/Precautions:    Medical/Treatment Diagnosis Information:  · Diagnosis: M75.121 tear of R Rotator cuff, s/p R RCR, SAD, long head of biceps tenodesis on 19  · Treatment Diagnosis: M75.121 tear of R Rotator cuff, R shld pain M25.511D, s/p R RCR, SAD, long head of biceps tenodesis on   Insurance/Certification information:  [facility]  Physician Information:  Referring Practitioner: Dr. Amita Sanchez of care signed (Y/N):     Date of Patient follow up with Physician: F/U in 4 months. G-Code (if applicable):      Date G-Code Applied:       MEDICARE CAP EXCEPTION DOCUMENTATION      I certify that this patient meets one of the below criteria necessary for becoming an exception to the Medicare cap on therapy services:    [x]  The patient has a condition identified by an ICD-10 code that has a direct and significant impact on the need for therapy. (Significantly impacts the rate of recovery.)                   []  The patient has a complexity identified by an ICD-10 code that has a direct and significant impact on the need for therapy. (Significantly impacts the rate of recovery and is associated with a primary condition.)         []  The patient has associated variables that influence the amount of treatment to include:  Social support, self-efficacy/motivation, prognosis, time since onset/acuity. []  The patient has generalized musculoskeletal conditions or a condition affecting multiple sites that will have a direct impact on the rate of recovery. [x]  The patient had a prior episode of outpatient therapy during this calendar year for a different condition. []  The patient has a mental or cognitive disorder in addition to the condition being treated that will have a direct and significant impact on the rate of recovery. Progress Note: []  Yes  [x]  No  Next due by: Visit #20      Latex Allergy:  [x]NO      []YES  Preferred Language for Healthcare:   [x]English       []other:    Visit # Insurance Allowable Requires auth   21 [employer]    []no        []yes:   15 weeks post-op 5/21/19  Pain level:  2-3/10     Height: 5' 5\", Weight: 135lbs. SUBJECTIVE:  No new issues in general. Using R UE more with activities. Took granddaughter to zoo and pushed her in the stroller with UE. Wasn't too sore in general.      OBJECTIVE:  Progressed HEP this visit. Reviewed and modified HEP. Provided Education in post-operative precautions/contraindications. Flex with cane 145 this visit. Observation:   Test measurements: On  Flex:160, ER:90. 161 flex AAROM with cane. PROM Flex: 161    RESTRICTIONS/PRECAUTIONS:  Abduction pillow /sling. Massive RCR protocol ( RCR, SAD and bicep tenodesis 2/6/19)   Post-op note. 1. Superior Labrum/Biceps Tendon. Extensive tearing of the long head biceps tendon. Intact superior labrum   2. Anterior/Posterior Labrum- Intact without fraying  3. Rotator Cuff- There was a full thickness tear of the supraspinatus tendon and anterior 50% of the infraspinatus tendon. There was a full thickness tear of the upper 50% of the subscapularis tendon. 4. Inferior Axillary Recess. No loose bodies  5. Articular Surfaces. Intact          Exercises/Interventions: Held strengthening exercises this visit due to increase soreness today.   Therapeutic Ex Sets/rep comments   FAV 4 10 x10 reps HEP   Flex/scap table slides H10 x10 + scap to HEP   Cross arm UE stretch in supine H30 x5 reps    SB flexion slides/scaption slides     Ext/Abd isometrics  + to HEP   Supine Er with 1.5 wt     ER with cane   HEP     punch without assist and BP with hands clasped  SL carrying, lifting, driving/computer work. Home Exercise Program:    [x] (60752) Reviewed/Progressed HEP activities related to strengthening, flexibility, endurance, ROM of scapular, scapulothoracic and UE control with self care, reaching, carrying, lifting, house/yardwork, driving/computer work  [] (04731) Reviewed/Progressed HEP activities related to improving balance, coordination, kinesthetic sense, posture, motor skill, proprioception of scapular, scapulothoracic and UE control with self care, reaching, carrying, lifting, house/yardwork, driving/computer work      Manual Treatments:  PROM / STM / Oscillations-Mobs:  G-I, II, III, IV (PA's, Inf., Post.)  [x] (56920) Provided manual therapy to mobilize soft tissue/joints of cervical/CT, scapular GHJ and UE for the purpose of modulating pain, promoting relaxation,  increasing ROM, reducing/eliminating soft tissue swelling/inflammation/restriction, improving soft tissue extensibility and allowing for proper ROM for normal function with self care, reaching, carrying, lifting, house/yardwork, driving/computer work    Modalities: ES with CP to R shld x 15 min after treatment    Charges:  Timed Code Treatment Minutes: 50'   Total Treatment Minutes: 80' (ATC +modality)     [] EVAL (LOW) 73698 (typically 20 minutes face-to-face)  [x] EVAL (MOD) 82145 (typically 30 minutes face-to-face)  [] EVAL (HIGH) 27559 (typically 45 minutes face-to-face)  [x] RE-EVAL     [x] LX(07156) x  1   [] IONTO  [x] NMR (79761) x  1   [] VASO  [x] Manual (32353) x  1    [] Other:  [] TA x       [] Mech Traction (47113)  [] ES(attended) (05534)      [x] ES (un) (05044):     GOALS:  Patient stated goal: Decrease pain     Therapist goals for Patient:   Short Term Goals: To be achieved in: 2 weeks  1. Independent in HEP and progression per patient tolerance, in order to prevent re-injury.    2. Patient will have a decrease in pain to facilitate improvement in movement, function, and ADLs as

## 2019-06-18 ENCOUNTER — HOSPITAL ENCOUNTER (OUTPATIENT)
Dept: PHYSICAL THERAPY | Age: 74
Setting detail: THERAPIES SERIES
Discharge: HOME | End: 2019-06-18
Payer: MEDICARE

## 2019-06-18 PROCEDURE — 97110 THERAPEUTIC EXERCISES: CPT | Performed by: PHYSICAL THERAPIST

## 2019-06-18 PROCEDURE — 97140 MANUAL THERAPY 1/> REGIONS: CPT | Performed by: PHYSICAL THERAPIST

## 2019-06-18 PROCEDURE — 97112 NEUROMUSCULAR REEDUCATION: CPT | Performed by: PHYSICAL THERAPIST

## 2019-06-18 PROCEDURE — G0283 ELEC STIM OTHER THAN WOUND: HCPCS | Performed by: PHYSICAL THERAPIST

## 2019-06-18 NOTE — FLOWSHEET NOTE
[facility], [address] [place]  Phone: [phone]  Fax [phone]      Physical Therapy Daily Treatment Note  Date:  2019    Patient Name:  Junior Tony  \" Anjali\"  :  1945  MRN: [id number]  Restrictions/Precautions:    Medical/Treatment Diagnosis Information:  · Diagnosis: M75.121 tear of R Rotator cuff, s/p R RCR, SAD, long head of biceps tenodesis on 19  · Treatment Diagnosis: M75.121 tear of R Rotator cuff, R shld pain M25.511D, s/p R RCR, SAD, long head of biceps tenodesis on 16  Insurance/Certification information:  PT Insurance Information: / AARP  Physician Information:  Referring Practitioner: Dr. Sherrill Hammond of care signed (Y/N):     Date of Patient follow up with Physician: F/U in 4 months. G-Code (if applicable):      Date G-Code Applied:       MEDICARE CAP EXCEPTION DOCUMENTATION      I certify that this patient meets one of the below criteria necessary for becoming an exception to the Medicare cap on therapy services:    [x]  The patient has a condition identified by an ICD-10 code that has a direct and significant impact on the need for therapy. (Significantly impacts the rate of recovery.)                   []  The patient has a complexity identified by an ICD-10 code that has a direct and significant impact on the need for therapy. (Significantly impacts the rate of recovery and is associated with a primary condition.)         []  The patient has associated variables that influence the amount of treatment to include:  Social support, self-efficacy/motivation, prognosis, time since onset/acuity. []  The patient has generalized musculoskeletal conditions or a condition affecting multiple sites that will have a direct impact on the rate of recovery. [x]  The patient had a prior episode of outpatient therapy during this calendar year for a different condition. []  The patient has a mental or cognitive disorder in addition to the condition being treated that will have a direct and significant impact on the rate of recovery. Progress Note: []  Yes  [x]  No  Next due by: Visit #20      Latex Allergy:  [x]NO      []YES  Preferred Language for Healthcare:   [x]English       []other:    Visit # Insurance Allowable Requires auth   22 MC/AARP    []no        []yes:   15 weeks post-op 5/21/19  Pain level:  2-3/10     Height: 5' 5\", Weight: 135lbs. SUBJECTIVE:  No new issues in general. Progressing well overall with ROM. OBJECTIVE:  Progressed HEP this visit. Reviewed and modified HEP. Provided Education in post-operative precautions/contraindications. Flex with cane 145 this visit. Observation:   Test measurements: On  Flex:160, ER:90. 161 flex AAROM with cane. PROM Flex: 161     RESTRICTIONS/PRECAUTIONS:  Abduction pillow /sling. Massive RCR protocol ( RCR, SAD and bicep tenodesis 2/6/19)   Post-op note. 1. Superior Labrum/Biceps Tendon. Extensive tearing of the long head biceps tendon. Intact superior labrum   2. Anterior/Posterior Labrum- Intact without fraying  3. Rotator Cuff- There was a full thickness tear of the supraspinatus tendon and anterior 50% of the infraspinatus tendon. There was a full thickness tear of the upper 50% of the subscapularis tendon. 4. Inferior Axillary Recess. No loose bodies  5. Articular Surfaces. Intact          Exercises/Interventions: Held strengthening exercises this visit due to increase soreness today.   Therapeutic Ex Sets/rep comments   FAV 4 10 x10 reps HEP   Flex/scap table slides H10 x10 + scap to HEP   Cross arm UE stretch in supine H30 x5 reps    SB flexion slides/scaption slides     Ext/Abd isometrics  + to HEP   Supine Er with 1.5 wt     ER with cane   HEP     punch without assist and BP with hands clasped  SL Er  SL Horizontal abd 0 wt 2x10 reps    SL abd with elbow straight 1#2 x10 reps each    SL Reviewed/Progressed HEP activities related to strengthening, flexibility, endurance, ROM of scapular, scapulothoracic and UE control with self care, reaching, carrying, lifting, house/yardwork, driving/computer work  [] (67802) Reviewed/Progressed HEP activities related to improving balance, coordination, kinesthetic sense, posture, motor skill, proprioception of scapular, scapulothoracic and UE control with self care, reaching, carrying, lifting, house/yardwork, driving/computer work      Manual Treatments:  PROM / STM / Oscillations-Mobs:  G-I, II, III, IV (PA's, Inf., Post.)  [x] (33767) Provided manual therapy to mobilize soft tissue/joints of cervical/CT, scapular GHJ and UE for the purpose of modulating pain, promoting relaxation,  increasing ROM, reducing/eliminating soft tissue swelling/inflammation/restriction, improving soft tissue extensibility and allowing for proper ROM for normal function with self care, reaching, carrying, lifting, house/yardwork, driving/computer work    Modalities: ES with CP to R shld x 15 min after treatment    Charges:  Timed Code Treatment Minutes: 50'   Total Treatment Minutes: 80' (ATC +modality)     [] EVAL (LOW) 98139 (typically 20 minutes face-to-face)  [x] EVAL (MOD) 19404 (typically 30 minutes face-to-face)  [] EVAL (HIGH) 56734 (typically 45 minutes face-to-face)  [x] RE-EVAL     [x] HJ(15983) x  1   [] IONTO  [x] NMR (19934) x  1   [] VASO  [x] Manual (30720) x  1    [] Other:  [] TA x       [] Mech Traction (73076)  [] ES(attended) (83397)      [x] ES (un) (89965):     GOALS:  Patient stated goal: Decrease pain     Therapist goals for Patient:   Short Term Goals: To be achieved in: 2 weeks  1. Independent in HEP and progression per patient tolerance, in order to prevent re-injury. 2. Patient will have a decrease in pain to facilitate improvement in movement, function, and ADLs as indicated by Functional Deficits.     Long Term Goals: To be achieved in: 12 weeks  1.

## 2019-06-21 ENCOUNTER — APPOINTMENT (OUTPATIENT)
Dept: PHYSICAL THERAPY | Age: 74
End: 2019-06-21
Payer: MEDICARE

## 2019-07-02 ENCOUNTER — HOSPITAL ENCOUNTER (OUTPATIENT)
Dept: PHYSICAL THERAPY | Age: 74
Setting detail: THERAPIES SERIES
Discharge: HOME | End: 2019-07-02
Payer: MEDICARE

## 2019-07-02 PROCEDURE — G0283 ELEC STIM OTHER THAN WOUND: HCPCS | Performed by: PHYSICAL THERAPIST

## 2019-07-02 PROCEDURE — 97112 NEUROMUSCULAR REEDUCATION: CPT | Performed by: PHYSICAL THERAPIST

## 2019-07-02 PROCEDURE — 97110 THERAPEUTIC EXERCISES: CPT | Performed by: PHYSICAL THERAPIST

## 2019-07-02 PROCEDURE — 97140 MANUAL THERAPY 1/> REGIONS: CPT | Performed by: PHYSICAL THERAPIST

## 2019-07-02 NOTE — FLOWSHEET NOTE
[facility], [address] [place]  Phone: [phone]  Fax [phone]      Physical Therapy Daily Treatment Note  Date:  2019    Patient Name:  Rayne Da Silva  \" Anjali\"  :  1945  MRN: [id number]  Restrictions/Precautions:    Medical/Treatment Diagnosis Information:  · Diagnosis: M75.121 tear of R Rotator cuff, s/p R RCR, SAD, long head of biceps tenodesis on 19  · Treatment Diagnosis: M75.121 tear of R Rotator cuff, R shld pain M25.511D, s/p R RCR, SAD, long head of biceps tenodesis on 09  Insurance/Certification information:  PT Insurance Information: / AARP  Physician Information:  Referring Practitioner: Dr. Wayne Matthews of care signed (Y/N):     Date of Patient follow up with Physician: F/U in 4 months. G-Code (if applicable):      Date G-Code Applied:       MEDICARE CAP EXCEPTION DOCUMENTATION      I certify that this patient meets one of the below criteria necessary for becoming an exception to the Medicare cap on therapy services:    [x]  The patient has a condition identified by an ICD-10 code that has a direct and significant impact on the need for therapy. (Significantly impacts the rate of recovery.)                   []  The patient has a complexity identified by an ICD-10 code that has a direct and significant impact on the need for therapy. (Significantly impacts the rate of recovery and is associated with a primary condition.)         []  The patient has associated variables that influence the amount of treatment to include:  Social support, self-efficacy/motivation, prognosis, time since onset/acuity. []  The patient has generalized musculoskeletal conditions or a condition affecting multiple sites that will have a direct impact on the rate of recovery. [x]  The patient had a prior episode of outpatient therapy during this calendar year for a different condition. []  The patient has a mental or cognitive disorder in addition to the condition being treated that will have a direct and significant impact on the rate of recovery. Progress Note: []  Yes  [x]  No  Next due by: Visit #20      Latex Allergy:  [x]NO      []YES  Preferred Language for Healthcare:   [x]English       []other:    Visit # Insurance Allowable Requires auth   23 MC/AARP    []no        []yes:   15 weeks post-op 5/21/19  Pain level:  2-3/10     Height: 5' 5\", Weight: 135lbs. SUBJECTIVE:  No new issues in general. Still has pain at night which wakes her up. Was on vacation last week and wasn't as good about doing HEP. Unable to unhook bra still, but able to use UE to fix hair. OBJECTIVE: AROM Abd: 115 this visit. Provided Education in post-operative precautions/contraindications. Flex with cane 145 this visit. Observation:   Test measurements:    Flex:160, ER:90. 161 flex AAROM with cane. PROM Flex: 161     RESTRICTIONS/PRECAUTIONS:  Abduction pillow /sling. Massive RCR protocol ( RCR, SAD and bicep tenodesis 2/6/19)   Post-op note. 1. Superior Labrum/Biceps Tendon. Extensive tearing of the long head biceps tendon. Intact superior labrum   2. Anterior/Posterior Labrum- Intact without fraying  3. Rotator Cuff- There was a full thickness tear of the supraspinatus tendon and anterior 50% of the infraspinatus tendon. There was a full thickness tear of the upper 50% of the subscapularis tendon. 4. Inferior Axillary Recess. No loose bodies  5. Articular Surfaces. Intact          Exercises/Interventions: Held strengthening exercises this visit due to increase soreness today.   Therapeutic Ex Sets/rep comments   FAV 4 10 x10 reps HEP   Flex/scap table slides H10 x10 + scap to HEP   Cross arm UE stretch in supine H30 x5 reps    SB flexion slides/scaption slides     Ext/Abd isometrics  + to HEP   Supine Er with 1.5 wt     ER with cane   HEP     punch without assist and BP with hands

## 2019-07-02 NOTE — FLOWSHEET NOTE
[facility],  [address] [place]  Phone: [phone]  Fax [phone]    ATHLETIC TRAINING [address]  Date:  2019    Patient Name:  Clovis Suresh  \"Anjali\"  :  1945  MRN: [id number]  Restrictions/Precautions:    Medical/Treatment Diagnosis Information:  ·  R RTCR, SAD, long head of biceps tenodesis     Physician Information:   Favorito      Weeks Post-op  2wks    4 wks 6 wks 8 wks   3wks 6 wks 9 wks 12 wks   19                     Activity Log                                                          DOS/DOI: 19                                                         Date: 19   ATC Communication  \"Feel like I've 'turned a corner'\"  Short on time today Short on time    Sharp p! c towel clocks when moving above 90 Has been on vacation           YTB Pullbacks R/L 10x ea OTB R/L Pullbacks 10x            True Stretch              Finisher              Plyoback      Chop                            Chest                            ER Flip              Long/Short lever  Flex.  YTB w/ER wall climb 15x OTB w/ER wall climb 15x OTB w/ER wall climb 20x OTB w/ER wall climb 20x                                Scap.                                    ABd.               punch YTB at 1/2 wall 15x5\" OTB at 1/2 wall 4\" 15x3\" OTB at 1/2 wall 4\" 15x3\" OTB at 1/2 wall 4\" 15x3\"          Body/Cardio Blade Flex/Ext                                       IR/ER                                       ABd/ADd              Push-up      Plus                              Wall                            Table                           Floor              Ball on Wall Towel  10x  Towel Clocks 7x Towel  10x  Towel Clocks 7x Towel  10x  held                Tramp              Theraband    Rows/Ext Rows GTB Ecc 20x  Ext YTB Ecc 20x                            IR

## 2019-07-05 ENCOUNTER — APPOINTMENT (OUTPATIENT)
Dept: PHYSICAL THERAPY | Age: 74
End: 2019-07-05
Payer: MEDICARE

## 2019-07-10 ENCOUNTER — HOSPITAL ENCOUNTER (OUTPATIENT)
Dept: PHYSICAL THERAPY | Age: 74
Setting detail: THERAPIES SERIES
Discharge: HOME | End: 2019-07-10
Payer: MEDICARE

## 2019-07-10 PROCEDURE — 97110 THERAPEUTIC EXERCISES: CPT | Performed by: PHYSICAL THERAPIST

## 2019-07-10 PROCEDURE — 97112 NEUROMUSCULAR REEDUCATION: CPT | Performed by: PHYSICAL THERAPIST

## 2019-07-10 PROCEDURE — G0283 ELEC STIM OTHER THAN WOUND: HCPCS | Performed by: PHYSICAL THERAPIST

## 2019-07-10 PROCEDURE — 97140 MANUAL THERAPY 1/> REGIONS: CPT | Performed by: PHYSICAL THERAPIST

## 2019-07-16 ENCOUNTER — HOSPITAL ENCOUNTER (OUTPATIENT)
Dept: PHYSICAL THERAPY | Age: 74
Setting detail: THERAPIES SERIES
Discharge: HOME | End: 2019-07-16
Payer: MEDICARE

## 2019-07-16 PROCEDURE — 97140 MANUAL THERAPY 1/> REGIONS: CPT | Performed by: PHYSICAL THERAPIST

## 2019-07-16 PROCEDURE — 97110 THERAPEUTIC EXERCISES: CPT | Performed by: PHYSICAL THERAPIST

## 2019-07-16 PROCEDURE — 97112 NEUROMUSCULAR REEDUCATION: CPT | Performed by: PHYSICAL THERAPIST

## 2019-07-16 PROCEDURE — G0283 ELEC STIM OTHER THAN WOUND: HCPCS | Performed by: PHYSICAL THERAPIST

## 2019-07-16 NOTE — FLOWSHEET NOTE
[facility],  [address]  Phone: [phone]  Fax [phone]    ATHLETIC TRAINING [address]  Date:  2019    Patient Name:  Rayne Da Silva  \"Anjali\"  :  1945  MRN: [id number]  Restrictions/Precautions:    Medical/Treatment Diagnosis Information:  ·  R RTCR, SAD, long head of biceps tenodesis     Physician Information:   Favorito      Weeks Post-op  2wks    4 wks 6 wks 8 wks   3wks 6 wks 9 wks 12 wks   19                     Activity Log                                                          DOS/DOI: 19                                                         Date: 6/18/19 7/2/19 7/10/19 7/16/19   ATC Communication Short on time    [employer] p! c towel clocks when moving above 90 Has been on vacation Cross body motion, IR, Hor abd limited               OVL pullback x15          True Stretch              Finisher              Plyoback      Chop                            Chest                            ER Flip              Long/Short lever  Flex. OTB w/ER wall climb 20x OTB w/ER wall climb 20x  OTB w/ER wall climb 20x                                Scap. AROM to 90 x10                                 ABd. Wall hor abd cross body x15       Wall hor abd lift off x10     punch OTB at 1/2 wall 4\" 15x3\" OTB at 1/2 wall 4\" 15x3\" YTB 15x5\" OVL 4\" at 1/2 wall 20x3\"           Body/Cardio Blade Flex/Ext                                       IR/ER                                       ABd/ADd              Push-up      Plus                              Wall                            Table                           Floor              Ball on Wall Towel  10x  held                  Tramp              Theraband    Rows/Ext                             IR                             ER                             No money                             Horiz.  ABd    YTB 2x10 Biceps                             Triceps                             Punches              Cable Column   Rows 20# 3x10 20# (B) 3x10 NV 20# (B) 3x10                              Ext.  15# (B) 3x10 15# (B) 3x10 15# (B) 3x10                              Lat. Pulldown 25# 3x10 25# (B) 3x10 NV 25# (B) 3x10                              Biceps                                  Triceps  20# (B) 3x10 NV 20# (B) 3x10          Modality declined PM/CP 13' PM/CP 15' PM/CP 13'   Initials DTM JLW DB DTM   Time spent one on one (workers comp)       Time spent with PT assistant

## 2019-07-16 NOTE — FLOWSHEET NOTE
[facility], [address] [place]  Phone: [phone]  Fax [phone]      Physical Therapy Daily Treatment Note  Date:  2019    Patient Name:  Loretta Long  \" Anjali\"  :  1945  MRN: [id number]  Restrictions/Precautions:    Medical/Treatment Diagnosis Information:  · Diagnosis: M75.121 tear of R Rotator cuff, s/p R RCR, SAD, long head of biceps tenodesis on 19  · Treatment Diagnosis: M75.121 tear of R Rotator cuff, R shld pain M25.511D, s/p R RCR, SAD, long head of biceps tenodesis on 01  Insurance/Certification information:  [facility]  Physician Information:  Referring Practitioner: Dr. Aiyana Cotter of care signed (Y/N):     Date of Patient follow up with Physician: Sept/Oct.     G-Code (if applicable):      Date G-Code Applied:       MEDICARE CAP EXCEPTION DOCUMENTATION      I certify that this patient meets one of the below criteria necessary for becoming an exception to the Medicare cap on therapy services:    [x]  The patient has a condition identified by an ICD-10 code that has a direct and significant impact on the need for therapy. (Significantly impacts the rate of recovery.)                   []  The patient has a complexity identified by an ICD-10 code that has a direct and significant impact on the need for therapy. (Significantly impacts the rate of recovery and is associated with a primary condition.)         []  The patient has associated variables that influence the amount of treatment to include:  Social support, self-efficacy/motivation, prognosis, time since onset/acuity. []  The patient has generalized musculoskeletal conditions or a condition affecting multiple sites that will have a direct impact on the rate of recovery. [x]  The patient had a prior episode of outpatient therapy during this calendar year for a different condition.

## 2019-07-24 ENCOUNTER — HOSPITAL ENCOUNTER (OUTPATIENT)
Dept: PHYSICAL THERAPY | Age: 74
Setting detail: THERAPIES SERIES
Discharge: HOME | End: 2019-07-24
Payer: MEDICARE

## 2019-07-24 PROCEDURE — 97110 THERAPEUTIC EXERCISES: CPT | Performed by: PHYSICAL THERAPIST

## 2019-07-24 PROCEDURE — 97112 NEUROMUSCULAR REEDUCATION: CPT | Performed by: PHYSICAL THERAPIST

## 2019-07-24 PROCEDURE — 97140 MANUAL THERAPY 1/> REGIONS: CPT | Performed by: PHYSICAL THERAPIST

## 2019-07-24 NOTE — FLOWSHEET NOTE
scap retraction     Pulleys 20 reps    Wall push-ups with SB 2x10 repsWall slides- straight/ X pattern  10 reps ea    HEP   BP with cane into shld flex stretches with bridges Supine 90/90 A/P , M/L  Tricep skull  3# 3 x 10   Supine with OVL flexion Serratus anterior 3#  H5 3x10 reps   Gentle post capsule (s)  Gentle sleeper (s) in SLH30 x 3 reps HEP   No money YTB   HEP   IR assist with strap H10 x 10 reps    IR TB ROM assist with hip movements TB Rows (RTB) Rows HEP   TB overhead flex/abd  [employer] walkouts SA with band GTB in standing TB ER/IR  HEPProne mid trap 0# [id number] shld ext 2#  2x 10 repsBiceps  TB PNF D2/Horizontal add RTB 2x10    AROM flex/scap/abd in standing 1# 10 reps + to HEP Manual Intervention     PROM R ld/ [facility] 1-2 mobs  Scope hole massage 10'                                  NMR re-education             The finisher         Ball on wall  2 reps of fatigue                       Therapeutic Exercise and NMR EXR  [x] (56613) Provided verbal/tactile cueing for activities related to strengthening, flexibility, endurance, ROM  for improvements in scapular, scapulothoracic and UE control with self care, reaching, carrying, lifting, house/yardwork, driving/computer work.    [] (00512) Provided verbal/tactile cueing for activities related to improving balance, coordination, kinesthetic sense, posture, motor skill, proprioception  to assist with  scapular, scapulothoracic and UE control with self care, reaching, carrying, lifting, house/yardwork, driving/computer work. Therapeutic Activities:    [] (03784 or 21577) Provided verbal/tactile cueing for activities related to improving balance, coordination, kinesthetic sense, posture, motor skill, proprioception and motor activation to allow for proper function of scapular, scapulothoracic and UE control with self care, carrying, lifting, driving/computer work.      Home Exercise Program:    [x] (92355) Reviewed/Progressed HEP activities related to strengthening, flexibility, endurance, ROM of scapular, scapulothoracic and UE control with self care, reaching, carrying, lifting, house/yardwork, driving/computer work  [] (00709) Reviewed/Progressed HEP activities related to improving balance, coordination, kinesthetic sense, posture, motor skill, proprioception of scapular, scapulothoracic and UE control with self care, reaching, carrying, lifting, house/yardwork, driving/computer work      Manual Treatments:  PROM / STM / Oscillations-Mobs:  G-I, II, III, IV (PA's, Inf., Post.)  [x] (92335) Provided manual therapy to mobilize soft tissue/joints of cervical/CT, scapular GHJ and UE for the purpose of modulating pain, promoting relaxation,  increasing ROM, reducing/eliminating soft tissue swelling/inflammation/restriction, improving soft tissue extensibility and allowing for proper ROM for normal function with self care, reaching, carrying, lifting, house/yardwork, driving/computer work    Modalities: Deferred this visit. Charges:  Timed Code Treatment Minutes: 39'   Total Treatment Minutes: 39'     [] EVAL (LOW) 81862 (typically 20 minutes face-to-face)  [x] EVAL (MOD) 60126 (typically 30 minutes face-to-face)  [] EVAL (HIGH) 12487 (typically 45 minutes face-to-face)  [x] RE-EVAL     [x] XT(44263) x  1   [] IONTO  [x] NMR (78103) x  1   [] VASO  [x] Manual (13153) x  1    [] Other:  [] TA x       [] Mech Traction (01368)  [] ES(attended) (67188)      [] ES (un) (04598):     GOALS:  Patient stated goal: Decrease pain     Therapist goals for Patient:   Short Term Goals: To be achieved in: 2 weeks  1. Independent in HEP and progression per patient tolerance, in order to prevent re-injury. 2. Patient will have a decrease in pain to facilitate improvement in movement, function, and ADLs as indicated by Functional Deficits.     Long Term Goals: To be achieved in: 12 weeks  1.  Disability index score of 35% or less for the [facility] to assist with reaching prior level of function. 2. Patient will demonstrate increased AROM to WNL without pain to allow for proper joint functioning as indicated by patients Functional Deficits. 3. Patient will demonstrate an increase in Strength to 3+//5 to allow for proper functional mobility as indicated by patients Functional Deficits. 4. Patient will return to reaching overhead to fix and dry hair functional activities without increased symptoms or restriction. 5. Patient to be able to open a jar without shld pain and weakness(patient specific functional goal)               Progression Towards Functional goals:  [x] Patient is progressing as expected towards functional goals listed. [] Progression is slowed due to complexities listed. [] Progression has been slowed due to co-morbidities. [] Plan just implemented, too soon to assess goals progression  [] Other:     ASSESSMENT: AROM much improved from last visit. Strength progressing well. Treatment/Activity Tolerance:  [x] Patient tolerated treatment well [] Patient limited by fatique  [] Patient limited by pain  [] Patient limited by other medical complications  [] Other:     Prognosis: [x] Good [] Fair  [] Poor    Patient Requires Follow-up: [x] Yes  [] No    PLAN: . Progress as tolerated. Decrease to 1x /week with PT next week. Consider D/C next week if progressing well.   [x] Continue per plan of care [] Alter current plan (see comments)  [] Plan of care initiated [] Hold pending MD visit [] Discharge    Electronically signed by: Eulalia Sargent, [address] [facility]

## 2019-07-31 ENCOUNTER — HOSPITAL ENCOUNTER (OUTPATIENT)
Dept: PHYSICAL THERAPY | Age: 74
Setting detail: THERAPIES SERIES
Discharge: HOME | End: 2019-07-31
Payer: MEDICARE

## 2019-07-31 PROCEDURE — G0283 ELEC STIM OTHER THAN WOUND: HCPCS | Performed by: PHYSICAL THERAPIST

## 2019-07-31 PROCEDURE — 97140 MANUAL THERAPY 1/> REGIONS: CPT | Performed by: PHYSICAL THERAPIST

## 2019-07-31 PROCEDURE — 97110 THERAPEUTIC EXERCISES: CPT | Performed by: PHYSICAL THERAPIST

## 2019-07-31 NOTE — FLOWSHEET NOTE
flexibility, endurance, ROM of scapular, scapulothoracic and UE control with self care, reaching, carrying, lifting, house/yardwork, driving/computer work  [] (20483) Reviewed/Progressed HEP activities related to improving balance, coordination, kinesthetic sense, posture, motor skill, proprioception of scapular, scapulothoracic and UE control with self care, reaching, carrying, lifting, house/yardwork, driving/computer work      Manual Treatments:  PROM / STM / Oscillations-Mobs:  G-I, II, III, IV (PA's, Inf., Post.)  [x] (58579) Provided manual therapy to mobilize soft tissue/joints of cervical/CT, scapular GHJ and UE for the purpose of modulating pain, promoting relaxation,  increasing ROM, reducing/eliminating soft tissue swelling/inflammation/restriction, improving soft tissue extensibility and allowing for proper ROM for normal function with self care, reaching, carrying, lifting, house/yardwork, driving/computer work    Modalities: ES with CP to R shld x 15 min after treatment. Charges:  Timed Code Treatment Minutes: 35'   Total Treatment Minutes: 61' with ATC     [] EVAL (LOW) 55661 (typically 20 minutes face-to-face)  [x] EVAL (MOD) 91603 (typically 30 minutes face-to-face)  [] EVAL (HIGH) 66915 (typically 45 minutes face-to-face)  [x] RE-EVAL     [x] HO(95389) x  1   [] IONTO  [] NMR (02256) x      [] VASO  [x] Manual (50412) x  1    [] Other:  [] TA x       [] Mech Traction (55386)  [] ES(attended) (54314)      [x] ES (un) (33600):     GOALS:  Patient stated goal: Decrease pain     Therapist goals for Patient:   Short Term Goals: To be achieved in: 2 weeks  1. Independent in HEP and progression per patient tolerance, in order to prevent re-injury. 2. Patient will have a decrease in pain to facilitate improvement in movement, function, and ADLs as indicated by Functional Deficits.     Long Term Goals: To be achieved in: 12 weeks  1.  Disability index score of 35% or less for the [facility] to assist

## 2019-08-07 ENCOUNTER — HOSPITAL ENCOUNTER (OUTPATIENT)
Dept: PHYSICAL THERAPY | Age: 74
Setting detail: THERAPIES SERIES
Discharge: HOME | End: 2019-08-07
Payer: MEDICARE

## 2019-08-07 PROCEDURE — 97140 MANUAL THERAPY 1/> REGIONS: CPT | Performed by: PHYSICAL THERAPIST

## 2019-08-07 PROCEDURE — 97110 THERAPEUTIC EXERCISES: CPT | Performed by: PHYSICAL THERAPIST

## 2019-08-07 NOTE — FLOWSHEET NOTE
condition. []  The patient has a mental or cognitive disorder in addition to the condition being treated that will have a direct and significant impact on the rate of recovery. Progress Note: []  Yes  [x]  No  Next due by: Visit #20      Latex Allergy:  [x]NO      []YES  Preferred Language for Healthcare:   [x]English       []other:    Visit # Insurance Allowable Requires auth   28 [employer]/A[employer]    []no        []yes:   15 weeks post-op 5/21/19  Pain level:  2/10     Height: 5' 5\", Weight: 135lbs. SUBJECTIVE:  Reports doing well. Can perform all ADL task without difficulty. Can almost reach bra strap. Can open most jars. OBJECTIVE: Less compensatory hiking with AROM for abd. Reviewed and modified HEP. Has gym routine. Added Sleeper stretches and horizontal post cuff stretch in SL to HEP to achieve IR goal.    Observation:   Test measurements:  Previous visit.:  Flex:160, ER:90. 161 flex AAROM with cane. PROM Flex: 161     RESTRICTIONS/PRECAUTIONS:  Abduction pillow /sling. Massive RCR protocol ( RCR, SAD and bicep tenodesis 2/6/19)   Post-op note. 1. Superior Labrum/Biceps Tendon. Extensive tearing of the long head biceps tendon. Intact superior labrum   2. Anterior/Posterior Labrum- Intact without fraying  3. Rotator Cuff- There was a full thickness tear of the supraspinatus tendon and anterior 50% of the infraspinatus tendon. There was a full thickness tear of the upper 50% of the subscapularis tendon. 4. Inferior Axillary Recess. No loose bodies  5. Articular Surfaces.  Intact          Exercises/Interventions:   Therapeutic Ex Sets/rep comments   FAV 4 10 x10 reps HEP   Flex/scap table slides H10 x10 + scap to HEP   Cross arm UE stretch in supine H30 x5 reps    SB flexion slides/scaption slides     Ext/Abd isometrics  + to HEP   Supine ER 3'     ER with cane   HEP     punch without assist and BP with hands clasped  SL Er 2#/1# H5 x12 ea reps  VC for alignment   SL Horizontal abd 1# wt Disability index score of 35% or less for the [facility] to assist with reaching prior level of function. MET  2. Patient will demonstrate increased AROM to WNL without pain to allow for proper joint functioning as indicated by patients Functional Deficits. MET  3. Patient will demonstrate an increase in Strength to 3+//5 to allow for proper functional mobility as indicated by patients Functional Deficits. MET  4. Patient will return to reaching overhead to fix and dry hair functional activities without increased symptoms or restriction. MET  5. Patient to be able to open a jar without shld pain and weakness(patient specific functional goal)   MET            Progression Towards Functional goals:  [x] Patient is progressing as expected towards functional goals listed. [] Progression is slowed due to complexities listed. [] Progression has been slowed due to co-morbidities. [] Plan just implemented, too soon to assess goals progression  [] Other:     ASSESSMENT: All goals achieved    Treatment/Activity Tolerance:  [x] Patient tolerated treatment well [] Patient limited by fatique  [] Patient limited by pain  [] Patient limited by other medical complications  [] Other:     Prognosis: [x] Good [] Fair  [] Poor    Patient Requires Follow-up: [x] Yes  [x] No    PLAN: D/C with HEP.  F/U with MD in early Oct.  [] Continue per plan of care [] Alter current plan (see comments)  [] Plan of care initiated [] Hold pending MD visit [x] Discharge    Electronically signed by: Juaquin Thomas [id number] [facility]

## 2019-10-17 ENCOUNTER — HOSPITAL ENCOUNTER (OUTPATIENT)
Dept: WOMENS IMAGING | Age: 74
Discharge: HOME | End: 2019-10-17
Payer: MEDICARE

## 2019-10-17 DIAGNOSIS — Z12.31 ENCOUNTER FOR SCREENING MAMMOGRAM FOR BREAST CANCER: ICD-10-CM

## 2019-10-17 PROCEDURE — 77063 BREAST TOMOSYNTHESIS BI: CPT

## 2021-06-06 ENCOUNTER — APPOINTMENT (OUTPATIENT)
Dept: GENERAL RADIOLOGY | Age: 76
DRG: 066 | End: 2021-06-06
Payer: MEDICARE

## 2021-06-06 ENCOUNTER — APPOINTMENT (OUTPATIENT)
Dept: CT IMAGING | Age: 76
DRG: 066 | End: 2021-06-06
Payer: MEDICARE

## 2021-06-06 ENCOUNTER — HOSPITAL ENCOUNTER (INPATIENT)
Age: 76
LOS: 2 days | Discharge: HOME | DRG: 066 | End: 2021-06-08
Attending: EMERGENCY MEDICINE | Admitting: PEDIATRICS
Payer: MEDICARE

## 2021-06-06 DIAGNOSIS — F41.1 ANXIETY STATE: ICD-10-CM

## 2021-06-06 DIAGNOSIS — R94.31 ABNORMAL EKG: ICD-10-CM

## 2021-06-06 DIAGNOSIS — I21.4 NSTEMI (NON-ST ELEVATED MYOCARDIAL INFARCTION) (HCC): Primary | ICD-10-CM

## 2021-06-06 DIAGNOSIS — R03.0 ELEVATED BLOOD PRESSURE READING: ICD-10-CM

## 2021-06-06 DIAGNOSIS — G45.4 TRANSIENT GLOBAL AMNESIA: ICD-10-CM

## 2021-06-06 DIAGNOSIS — I48.0 PAROXYSMAL ATRIAL FIBRILLATION (HCC): ICD-10-CM

## 2021-06-06 PROBLEM — R41.3 MEMORY LOSS: Status: ACTIVE | Noted: 2021-06-06

## 2021-06-06 LAB
A/G RATIO: 1.5 (ref 1.1–2.2)
ALBUMIN SERPL-MCNC: 4.5 G/DL (ref 3.4–5)
ALP BLD-CCNC: 78 U/L (ref 40–129)
ALT SERPL-CCNC: 21 U/L (ref 10–40)
ANION GAP SERPL CALCULATED.3IONS-SCNC: 13 MMOL/L (ref 3–16)
APTT: 35.7 SEC (ref 24.2–36.2)
AST SERPL-CCNC: 28 U/L (ref 15–37)
BASOPHILS ABSOLUTE: 0 K/UL (ref 0–0.2)
BASOPHILS RELATIVE PERCENT: 0.6 %
BILIRUB SERPL-MCNC: <0.2 MG/DL (ref 0–1)
BILIRUBIN URINE: NEGATIVE
BLOOD, URINE: NEGATIVE
BUN BLDV-MCNC: 23 MG/DL (ref 7–20)
CALCIUM SERPL-MCNC: 9.8 MG/DL (ref 8.3–10.6)
CHLORIDE BLD-SCNC: 99 MMOL/L (ref 99–110)
CHP ED QC CHECK: YES
CLARITY: CLEAR
CO2: 24 MMOL/L (ref 21–32)
COLOR: NORMAL
CREAT SERPL-MCNC: 0.8 MG/DL (ref 0.6–1.2)
EOSINOPHILS ABSOLUTE: 0.2 K/UL (ref 0–0.6)
EOSINOPHILS RELATIVE PERCENT: 2.3 %
GFR AFRICAN AMERICAN: >60
GFR NON-AFRICAN AMERICAN: >60
GLOBULIN: 3.1 G/DL
GLUCOSE BLD-MCNC: 101 MG/DL (ref 70–99)
GLUCOSE BLD-MCNC: 92 MG/DL
GLUCOSE BLD-MCNC: 92 MG/DL (ref 70–99)
GLUCOSE URINE: NEGATIVE MG/DL
HCT VFR BLD CALC: 42 % (ref 36–48)
HEMOGLOBIN: 14.1 G/DL (ref 12–16)
INR BLD: 1.04 (ref 0.86–1.14)
KETONES, URINE: NEGATIVE MG/DL
LEUKOCYTE ESTERASE, URINE: NEGATIVE
LYMPHOCYTES ABSOLUTE: 3.3 K/UL (ref 1–5.1)
LYMPHOCYTES RELATIVE PERCENT: 45.4 %
MAGNESIUM: 1.9 MG/DL (ref 1.8–2.4)
MCH RBC QN AUTO: 29.9 PG (ref 26–34)
MCHC RBC AUTO-ENTMCNC: 33.6 G/DL (ref 31–36)
MCV RBC AUTO: 89.1 FL (ref 80–100)
MICROSCOPIC EXAMINATION: NORMAL
MONOCYTES ABSOLUTE: 0.8 K/UL (ref 0–1.3)
MONOCYTES RELATIVE PERCENT: 11.3 %
NEUTROPHILS ABSOLUTE: 2.9 K/UL (ref 1.7–7.7)
NEUTROPHILS RELATIVE PERCENT: 40.4 %
NITRITE, URINE: NEGATIVE
PDW BLD-RTO: 12.9 % (ref 12.4–15.4)
PERFORMED ON: NORMAL
PH UA: 7 (ref 5–8)
PLATELET # BLD: 266 K/UL (ref 135–450)
PMV BLD AUTO: 7.8 FL (ref 5–10.5)
POTASSIUM SERPL-SCNC: 4 MMOL/L (ref 3.5–5.1)
PROTEIN UA: NEGATIVE MG/DL
PROTHROMBIN TIME: 12.1 SEC (ref 10–13.2)
RBC # BLD: 4.71 M/UL (ref 4–5.2)
SODIUM BLD-SCNC: 136 MMOL/L (ref 136–145)
SPECIFIC GRAVITY UA: 1.01 (ref 1–1.03)
SPECIMEN STATUS: NORMAL
TOTAL PROTEIN: 7.6 G/DL (ref 6.4–8.2)
TROPONIN: 0.13 NG/ML
TROPONIN: 0.34 NG/ML
TROPONIN: <0.01 NG/ML
URINE TYPE: NORMAL
UROBILINOGEN, URINE: 0.2 E.U./DL
WBC # BLD: 7.2 K/UL (ref 4–11)

## 2021-06-06 PROCEDURE — 81003 URINALYSIS AUTO W/O SCOPE: CPT

## 2021-06-06 PROCEDURE — 85610 PROTHROMBIN TIME: CPT

## 2021-06-06 PROCEDURE — 80053 COMPREHEN METABOLIC PANEL: CPT

## 2021-06-06 PROCEDURE — 93005 ELECTROCARDIOGRAM TRACING: CPT | Performed by: EMERGENCY MEDICINE

## 2021-06-06 PROCEDURE — 36415 COLL VENOUS BLD VENIPUNCTURE: CPT

## 2021-06-06 PROCEDURE — 83735 ASSAY OF MAGNESIUM: CPT

## 2021-06-06 PROCEDURE — 6370000000 HC RX 637 (ALT 250 FOR IP): Performed by: PEDIATRICS

## 2021-06-06 PROCEDURE — 84484 ASSAY OF TROPONIN QUANT: CPT

## 2021-06-06 PROCEDURE — 2580000003 HC RX 258: Performed by: PEDIATRICS

## 2021-06-06 PROCEDURE — 6370000000 HC RX 637 (ALT 250 FOR IP): Performed by: EMERGENCY MEDICINE

## 2021-06-06 PROCEDURE — 70496 CT ANGIOGRAPHY HEAD: CPT

## 2021-06-06 PROCEDURE — 70450 CT HEAD/BRAIN W/O DYE: CPT

## 2021-06-06 PROCEDURE — 96361 HYDRATE IV INFUSION ADD-ON: CPT

## 2021-06-06 PROCEDURE — 6360000004 HC RX CONTRAST MEDICATION: Performed by: EMERGENCY MEDICINE

## 2021-06-06 PROCEDURE — 6360000002 HC RX W HCPCS: Performed by: EMERGENCY MEDICINE

## 2021-06-06 PROCEDURE — 2580000003 HC RX 258: Performed by: EMERGENCY MEDICINE

## 2021-06-06 PROCEDURE — 85025 COMPLETE CBC W/AUTO DIFF WBC: CPT

## 2021-06-06 PROCEDURE — 96376 TX/PRO/DX INJ SAME DRUG ADON: CPT

## 2021-06-06 PROCEDURE — 96374 THER/PROPH/DIAG INJ IV PUSH: CPT

## 2021-06-06 PROCEDURE — 1200000000 HC SEMI PRIVATE

## 2021-06-06 PROCEDURE — 85730 THROMBOPLASTIN TIME PARTIAL: CPT

## 2021-06-06 PROCEDURE — 71045 X-RAY EXAM CHEST 1 VIEW: CPT

## 2021-06-06 PROCEDURE — 99285 EMERGENCY DEPT VISIT HI MDM: CPT

## 2021-06-06 RX ORDER — SODIUM CHLORIDE 0.9 % (FLUSH) 0.9 %
5-40 SYRINGE (ML) INJECTION PRN
Status: DISCONTINUED | OUTPATIENT
Start: 2021-06-06 | End: 2021-06-08 | Stop reason: HOSPADM

## 2021-06-06 RX ORDER — ONDANSETRON 2 MG/ML
4 INJECTION INTRAMUSCULAR; INTRAVENOUS EVERY 6 HOURS PRN
Status: DISCONTINUED | OUTPATIENT
Start: 2021-06-06 | End: 2021-06-08 | Stop reason: HOSPADM

## 2021-06-06 RX ORDER — TIZANIDINE 4 MG/1
4 TABLET ORAL 2 TIMES DAILY PRN
Status: DISCONTINUED | OUTPATIENT
Start: 2021-06-06 | End: 2021-06-08 | Stop reason: HOSPADM

## 2021-06-06 RX ORDER — 0.9 % SODIUM CHLORIDE 0.9 %
500 INTRAVENOUS SOLUTION INTRAVENOUS ONCE
Status: COMPLETED | OUTPATIENT
Start: 2021-06-06 | End: 2021-06-06

## 2021-06-06 RX ORDER — ASPIRIN 81 MG/1
324 TABLET, CHEWABLE ORAL ONCE
Status: COMPLETED | OUTPATIENT
Start: 2021-06-06 | End: 2021-06-06

## 2021-06-06 RX ORDER — SODIUM CHLORIDE 0.9 % (FLUSH) 0.9 %
5-40 SYRINGE (ML) INJECTION EVERY 12 HOURS SCHEDULED
Status: DISCONTINUED | OUTPATIENT
Start: 2021-06-06 | End: 2021-06-08 | Stop reason: HOSPADM

## 2021-06-06 RX ORDER — HEPARIN SODIUM 1000 [USP'U]/ML
30 INJECTION, SOLUTION INTRAVENOUS; SUBCUTANEOUS PRN
Status: DISCONTINUED | OUTPATIENT
Start: 2021-06-06 | End: 2021-06-07

## 2021-06-06 RX ORDER — POLYETHYLENE GLYCOL 3350 17 G/17G
17 POWDER, FOR SOLUTION ORAL DAILY PRN
Status: DISCONTINUED | OUTPATIENT
Start: 2021-06-06 | End: 2021-06-08 | Stop reason: HOSPADM

## 2021-06-06 RX ORDER — LORAZEPAM 2 MG/ML
0.5 INJECTION INTRAMUSCULAR ONCE
Status: COMPLETED | OUTPATIENT
Start: 2021-06-06 | End: 2021-06-06

## 2021-06-06 RX ORDER — ASPIRIN 81 MG/1
81 TABLET ORAL DAILY
Status: DISCONTINUED | OUTPATIENT
Start: 2021-06-07 | End: 2021-06-08 | Stop reason: HOSPADM

## 2021-06-06 RX ORDER — TRAZODONE HYDROCHLORIDE 50 MG/1
50 TABLET ORAL NIGHTLY PRN
Status: DISCONTINUED | OUTPATIENT
Start: 2021-06-06 | End: 2021-06-08 | Stop reason: HOSPADM

## 2021-06-06 RX ORDER — HEPARIN SODIUM 1000 [USP'U]/ML
60 INJECTION, SOLUTION INTRAVENOUS; SUBCUTANEOUS PRN
Status: DISCONTINUED | OUTPATIENT
Start: 2021-06-06 | End: 2021-06-07

## 2021-06-06 RX ORDER — LABETALOL HYDROCHLORIDE 5 MG/ML
10 INJECTION, SOLUTION INTRAVENOUS EVERY 10 MIN PRN
Status: DISCONTINUED | OUTPATIENT
Start: 2021-06-06 | End: 2021-06-08 | Stop reason: HOSPADM

## 2021-06-06 RX ORDER — ONDANSETRON 4 MG/1
4 TABLET, ORALLY DISINTEGRATING ORAL EVERY 8 HOURS PRN
Status: DISCONTINUED | OUTPATIENT
Start: 2021-06-06 | End: 2021-06-08 | Stop reason: HOSPADM

## 2021-06-06 RX ORDER — SODIUM CHLORIDE 9 MG/ML
25 INJECTION, SOLUTION INTRAVENOUS PRN
Status: DISCONTINUED | OUTPATIENT
Start: 2021-06-06 | End: 2021-06-08 | Stop reason: HOSPADM

## 2021-06-06 RX ORDER — ATORVASTATIN CALCIUM 80 MG/1
80 TABLET, FILM COATED ORAL NIGHTLY
Status: DISCONTINUED | OUTPATIENT
Start: 2021-06-06 | End: 2021-06-08 | Stop reason: HOSPADM

## 2021-06-06 RX ORDER — VENLAFAXINE HYDROCHLORIDE 37.5 MG/1
75 CAPSULE, EXTENDED RELEASE ORAL DAILY
Status: DISCONTINUED | OUTPATIENT
Start: 2021-06-07 | End: 2021-06-08 | Stop reason: HOSPADM

## 2021-06-06 RX ORDER — CALCIUM CARBONATE 500(1250)
600 TABLET ORAL 2 TIMES DAILY PRN
Status: DISCONTINUED | OUTPATIENT
Start: 2021-06-06 | End: 2021-06-08 | Stop reason: HOSPADM

## 2021-06-06 RX ORDER — ASPIRIN 300 MG/1
300 SUPPOSITORY RECTAL DAILY
Status: DISCONTINUED | OUTPATIENT
Start: 2021-06-07 | End: 2021-06-08 | Stop reason: HOSPADM

## 2021-06-06 RX ORDER — HYDROXYZINE PAMOATE 25 MG/1
50 CAPSULE ORAL ONCE
Status: COMPLETED | OUTPATIENT
Start: 2021-06-06 | End: 2021-06-06

## 2021-06-06 RX ORDER — HEPARIN SODIUM 1000 [USP'U]/ML
60 INJECTION, SOLUTION INTRAVENOUS; SUBCUTANEOUS ONCE
Status: COMPLETED | OUTPATIENT
Start: 2021-06-06 | End: 2021-06-06

## 2021-06-06 RX ORDER — HEPARIN SODIUM 10000 [USP'U]/100ML
680 INJECTION, SOLUTION INTRAVENOUS CONTINUOUS
Status: DISCONTINUED | OUTPATIENT
Start: 2021-06-06 | End: 2021-06-07

## 2021-06-06 RX ADMIN — HEPARIN SODIUM 680 UNITS/HR: 10000 INJECTION, SOLUTION INTRAVENOUS at 23:30

## 2021-06-06 RX ADMIN — HYDROXYZINE PAMOATE 50 MG: 25 CAPSULE ORAL at 19:05

## 2021-06-06 RX ADMIN — HEPARIN SODIUM 3400 UNITS: 1000 INJECTION INTRAVENOUS; SUBCUTANEOUS at 23:31

## 2021-06-06 RX ADMIN — ASPIRIN 324 MG: 81 TABLET, CHEWABLE ORAL at 19:53

## 2021-06-06 RX ADMIN — SODIUM CHLORIDE 500 ML: 9 INJECTION, SOLUTION INTRAVENOUS at 18:37

## 2021-06-06 RX ADMIN — ATORVASTATIN CALCIUM 80 MG: 80 TABLET, FILM COATED ORAL at 23:33

## 2021-06-06 RX ADMIN — Medication 10 ML: at 23:33

## 2021-06-06 RX ADMIN — IOPAMIDOL 75 ML: 755 INJECTION, SOLUTION INTRAVENOUS at 18:28

## 2021-06-06 RX ADMIN — LORAZEPAM 0.5 MG: 2 INJECTION, SOLUTION INTRAMUSCULAR; INTRAVENOUS at 18:38

## 2021-06-06 RX ADMIN — LORAZEPAM 0.5 MG: 2 INJECTION, SOLUTION INTRAMUSCULAR; INTRAVENOUS at 19:53

## 2021-06-06 ASSESSMENT — PAIN SCALES - GENERAL: PAINLEVEL_OUTOF10: 0

## 2021-06-06 NOTE — H&P
Hospital Medicine History & Physical      PCP: Delfin Pro    Date of Admission: 2021    Date of Service: Pt seen/examined on 21  and Admitted to Inpatient with expected LOS greater than two midnights due to medical therapy. Chief Complaint:  Feeling confused, lost her memory       History Of Present Illness:       77 y.o. female who presented to [facility] with her son with report that she was feeling confused today and has had memory problems around 1730. She ordered decaf coffee but her son suspects that she was given caffeinated coffee instead. Today she attended a [employer] and a lunch but subsequently didn't recall either. Has also had trouble recalling conversations that she had just had. No headache, no fever, no neck stiffness. Denies sensation of focal weakness, numbness, or paresthesia. No trouble with speech / word formation. She admits to feeling confused. She guessed the present month to be \"close to vacation, - maybe . \" She correctly reports the year as . She knows her home address. She is aware that she is at [employer]. She does have hypertension. No DM. Has a history of issues with pseudohypoglycemia (no measured lows, but she doesn't feel right and attributes it to needing to eat). No chest pain or pressure. No c/o dyspnea. She presents with ST depressions on her EKG and a abnormal troponin to 0.13. She has a history of atrial fibrillation and previously was treated with an ablation. No daily aspirin, no oral anticoagulation per patient.        ROS:   No nausea   No vomiting   No pain   No dyspnea   No dysuria     FamHx:   - Mom - living-   - dad -  from \"old age\" in his 80s     SocHx:   - does not smoke, previously smoked in college   - social alcohol consumption, maybe 4 drinks / week   - denies marijuana use   -    - has 3 children (accompanied by her son Brian Witt today)         Past Medical History:          Diagnosis Date  Anesthesia complication     Low B/P after atrial ablation (SBP 60)    Anxiety     Atrial fibrillation (HCC)     CAD (coronary artery disease) 5/11/12    at fib    Rotator cuff tendinitis 2/15/2016       Past Surgical History:          Procedure Laterality Date    ATRIAL ABLATION SURGERY  3/2012       Medications Prior to Admission:      Prior to Admission medications    Medication Sig Start Date End Date Taking? Authorizing Provider   tiZANidine (ZANAFLEX) 4 MG tablet Take 1 tablet by mouth 2 times daily as needed (muscle spasms) 11/12/18   BERNA Santos   ibuprofen (ADVIL;MOTRIN) 200 MG tablet Take 200 mg by mouth every 6 hours as needed for Pain    Historical Provider, MD   calcium carbonate 600 MG TABS tablet Take 1 tablet by mouth 2 times daily as needed    Historical Provider, MD   diclofenac (VOLTAREN) 75 MG EC tablet Take 1 tablet by mouth 2 times daily 1/4/16   Sabrina Blue MD   aspirin 81 MG tablet Take 81 mg by mouth daily. Historical Provider, MD   venlafaxine (EFFEXOR-XR) 75 MG XR capsule Take 75 mg by mouth daily. Historical Provider, MD       Allergies:  Cephalexin and Pcn [penicillins]    Social History:      The patient currently lives at home alone with her cat (Laura)     TOBACCO:   reports that she quit smoking about 42 years ago. She has never used smokeless tobacco.  ETOH:   reports current alcohol use of about 5.0 standard drinks of alcohol per week. E-Cigarettes/Vaping Use     Questions Responses    E-Cigarette/Vaping Use     Start Date     Passive Exposure     Quit Date     Counseling Given     Comments             Family History:      As noted above     REVIEW OF SYSTEMS COMPLETED:   Pertinent positives as noted in the HPI. All other systems reviewed and negative.     PHYSICAL EXAM PERFORMED:    BP (!) 153/82   Pulse 80   Temp 97 °F (36.1 °C) (Oral)   Resp 22   Wt 125 lb (56.7 kg)   SpO2 97%   BMI 20.80 kg/m²     General appearance:  No apparent distress, Camila Ruiz MD    Thank you Benigno Rodriguez for the opportunity to be involved in this patient's care.

## 2021-06-06 NOTE — ED NOTES
18- Called  Stroke team  Per [id number] [facility]  RE Code stroke  475- Stroke team returned call     Alexandro Schofield  06/06/21 0709

## 2021-06-06 NOTE — ED PROVIDER NOTES
EMERGENCY DEPARTMENT ENCOUNTER        Pt Name: Aracelis Zavala  MRN: [id number]  Armsishgfamina 1945  Date of evaluation: 6/6/2021  Provider: David Thomason MD  PCP: Mc Mccann       Chief Complaint   Patient presents with    Altered Mental Status     ordered a decaf at [address], son states that he believes she had caffeine instead of decan and is now having memory loss, has had same conversation, has had this reaction to caffeine before       HISTORY OFPRESENT ILLNESS   (Location/Symptom, Timing/Onset, Context/Setting, Quality, Duration, Modifying Factors,Severity)  Note limiting factors. Aracelis Zavala is a 77 y.o. female presenting today due to concern for having coffee around 2:30 PM today when she started to you have some trouble with speech to the point where she was correcting herself multiple times and having repetitive speech due to having short-term memory loss. She denied any headache, chest pain, source of breath, abdominal pain, other concerning symptoms. She is currently with her son. She has a history of anxiety when having products with caffeine although they believe she had decaffeinated coffee at [facility]. She was able to walk in on her own. She is never had symptoms like this before in regards to short-term memory loss after having caffeine in the past.  She denies any prior history of stroke. She had an atrial ablation in 2012 but otherwise denies any other cardiac problems. She is on aspirin but no other blood thinners. History is limited though due to short-term memory loss and the patient is continually repeating yourself and asking what happened. Her son is with her. REVIEW OF SYSTEMS    (2-9 systems for level 4, 10 or more for level 5)     Review of Systems   Unable to perform ROS: Other   Constitutional: Negative for chills, diaphoresis, fatigue and fever. Eyes: Negative for pain and visual disturbance.    Respiratory: Negative for shortness of breath. Cardiovascular: Negative for chest pain. Gastrointestinal: Negative for abdominal pain. Musculoskeletal: Negative for neck pain. Skin: Negative for wound (no reported falls per son). Neurological: Positive for speech difficulty. Negative for weakness, numbness and headaches. Hematological: Does not bruise/bleed easily (only on aspirin). Psychiatric/Behavioral: Positive for confusion. The patient is nervous/anxious. short term memory loss so limited with ROS but patient denying any complaints at this time besides anxiety and not knowing why she is at the hospital       Positives and Pertinent negatives as per HPI. PASTMEDICAL HISTORY     Past Medical History:   Diagnosis Date    Anesthesia complication     Low B/P after atrial ablation (SBP 60)    Anxiety     Atrial fibrillation (HCC)     CAD (coronary artery disease) 5/11/12    at fib    Rotator cuff tendinitis 2/15/2016         SURGICAL HISTORY       Past Surgical History:   Procedure Laterality Date    ATRIAL ABLATION SURGERY  3/2012         CURRENT MEDICATIONS       Current Discharge Medication List      CONTINUE these medications which have NOT CHANGED    Details   ibuprofen (ADVIL;MOTRIN) 200 MG tablet Take 200 mg by mouth every 6 hours as needed for Pain    Associated Diagnoses: Right shoulder pain, unspecified chronicity; Impingement syndrome of right shoulder      calcium carbonate 600 MG TABS tablet Take 1 tablet by mouth 2 times daily as needed      venlafaxine (EFFEXOR-XR) 75 MG XR capsule Take 75 mg by mouth daily. tiZANidine (ZANAFLEX) 4 MG tablet Take 1 tablet by mouth 2 times daily as needed (muscle spasms)  Qty: 40 tablet, Refills: 0      diclofenac (VOLTAREN) 75 MG EC tablet Take 1 tablet by mouth 2 times daily  Qty: 42 tablet, Refills: 0      aspirin 81 MG tablet Take 81 mg by mouth daily. ALLERGIES     Cephalexin and Pcn [penicillins]    FAMILY HISTORY     History reviewed.  No pertinent family history. SOCIAL HISTORY       Social History     Socioeconomic History    Marital status:      Spouse name: None    Number of children: None    Years of education: None    Highest education level: None   Occupational History    None   Tobacco Use    Smoking status: Former Smoker     Quit date: 1979     Years since quittin.3    Smokeless tobacco: Never Used   Substance and Sexual Activity    Alcohol use: Yes     Alcohol/week: 5.0 standard drinks     Types: 5 Glasses of wine per week    Drug use: No    Sexual activity: Yes     Partners: Male   Other Topics Concern    None   Social History Narrative    None     Social Determinants of Health     Financial Resource Strain:     Difficulty of Paying Living Expenses:    Food Insecurity:     Worried About Running Out of Food in the Last Year:     Ran Out of Food in the Last Year:    Transportation Needs:     Lack of Transportation (Medical):  Lack of Transportation (Non-Medical):    Physical Activity:     Days of Exercise per Week:     Minutes of Exercise per Session:    Stress:     Feeling of Stress :    Social Connections:     Frequency of Communication with Friends and Family:     Frequency of Social Gatherings with Friends and Family:     Attends Mu-ism Services:     Active Member of Clubs or Organizations:     Attends Club or Organization Meetings:     Marital Status:    Intimate Partner Violence:     Fear of Current or Ex-Partner:     Emotionally Abused:     Physically Abused:     Sexually Abused:        SCREENINGS   NIH Stroke Scale  Interval: Reassessment  Level of Consciousness (1a. ): Alert  LOC Questions (1b. ):  Answers both correctly  LOC Commands (1c. ): Performs both tasks correctly  Best Gaze (2. ): Normal  Visual (3. ): No visual loss  Facial Palsy (4. ): Normal symmetrical movement  Motor Arm, Left (5a. ): No drift  Motor Arm, Right (5b. ): No drift  Motor Leg, Left (6a. ): No drift  Motor Leg, Right (6b. ): No drift  Limb Ataxia (7. ): Absent  Sensory (8. ): Normal  Best Language (9. ): No aphasia  Dysarthria (10. ): Normal  Extinction and Inattention (11): No abnormality  Total: 0Glasgow Coma Scale  Eye Opening: Spontaneous  Best Verbal Response: Oriented  Best Motor Response: Obeys commands  Eric Coma Scale Score: 15           PHYSICAL EXAM    (up to 7 for level 4, 8 or more for level 5)     ED Triage Vitals [06/06/21 1803]   BP Temp Temp Source Pulse Resp SpO2 Height Weight   (!) 178/96 97 °F (36.1 °C) Oral 94 20 99 % -- 125 lb (56.7 kg)       Physical Exam  Vitals and nursing note reviewed. Constitutional:       General: She is not in acute distress. Appearance: Normal appearance. She is well-developed and normal weight. She is not ill-appearing, toxic-appearing or diaphoretic. HENT:      Head: Normocephalic and atraumatic. Right Ear: External ear normal.      Left Ear: External ear normal.      Nose: Nose normal.      Mouth/Throat:      Mouth: Mucous membranes are moist.      Pharynx: Oropharynx is clear. No oropharyngeal exudate or posterior oropharyngeal erythema. Eyes:      General: Lids are normal. No visual field deficit or scleral icterus. Right eye: No discharge. Left eye: No discharge. Extraocular Movements: Extraocular movements intact. Right eye: Normal extraocular motion and no nystagmus. Left eye: Normal extraocular motion and no nystagmus. Conjunctiva/sclera: Conjunctivae normal.      Pupils: Pupils are equal, round, and reactive to light. Pupils are equal.      Right eye: Pupil is round and reactive. Left eye: Pupil is round and reactive. Visual Fields: Right eye visual fields normal and left eye visual fields normal.   Neck:      Trachea: Trachea and phonation normal. No tracheal deviation. Cardiovascular:      Rate and Rhythm: Normal rate and regular rhythm.       Pulses:           Radial pulses are 2+ on the right side and 2+ on the left side. Heart sounds: Normal heart sounds. Pulmonary:      Effort: Pulmonary effort is normal. No accessory muscle usage or respiratory distress. Breath sounds: Normal breath sounds. No stridor. No decreased breath sounds, wheezing, rhonchi or rales. Chest:      Chest wall: No tenderness. Abdominal:      General: Bowel sounds are normal. There is no distension. Palpations: Abdomen is soft. Abdomen is not rigid. Tenderness: There is no abdominal tenderness. There is no guarding or rebound. Negative signs include Gonzales's sign and McBurney's sign. Musculoskeletal:         General: No tenderness or deformity. Normal range of motion. Cervical back: Full passive range of motion without pain, normal range of motion and neck supple. No edema, erythema, signs of trauma, rigidity, torticollis or crepitus. No pain with movement, spinous process tenderness or muscular tenderness. Normal range of motion. Right lower leg: No edema. Left lower leg: No edema. Skin:     General: Skin is warm and dry. Capillary Refill: Capillary refill takes less than 2 seconds. Coloration: Skin is not cyanotic or pale. Findings: No erythema or rash. Neurological:      General: No focal deficit present. Mental Status: She is alert and oriented to person, place, and time. She is confused. GCS: GCS eye subscore is 4. GCS verbal subscore is 5. GCS motor subscore is 6. Cranial Nerves: Cranial nerves are intact. No cranial nerve deficit, dysarthria or facial asymmetry. Sensory: Sensation is intact. No sensory deficit. Motor: Motor function is intact. No weakness, tremor, atrophy, abnormal muscle tone, seizure activity or pronator drift. Coordination: Coordination is intact. Coordination normal. Finger-Nose-Finger Test and Heel to [facility] Test normal.      Gait: Gait is intact.  Gait normal.      Comments: NIHSS = 1 (due to confusion with repeating why she is here only)   Psychiatric:         Attention and Perception: Attention normal.         Mood and Affect: Affect normal. Mood is anxious. Speech: Speech is not slurred. Behavior: Behavior normal. Behavior is cooperative. Cognition and Memory: Memory is impaired. She exhibits impaired recent memory. She does not exhibit impaired remote memory.              DIAGNOSTIC RESULTS   :    Labs Reviewed   COMPREHENSIVE METABOLIC PANEL - Abnormal; Notable for the following components:       Result Value    Glucose 101 (*)     BUN 23 (*)     All other components within normal limits    Narrative:     Performed at:  [address], [facility]   Phone [phone]   TROPONIN - Abnormal; Notable for the following components:    Troponin 0.13 (*)     All other components within normal limits    Narrative:     Performed at:  [address], [facility]   Phone [phone]   LIPID PANEL - Abnormal; Notable for the following components:    HDL 67 (*)     All other components within normal limits    Narrative:     Performed at:  [address] [address]   Phone [phone]   TROPONIN - Abnormal; Notable for the following components:    Troponin 0.34 (*)     All other components within normal limits    Narrative:     Performed at:  [address], [facility]   Phone [phone]   TROPONIN - Abnormal; Notable for the following components:    Troponin 0.39 (*)     All other components within normal limits    Narrative:     Performed at:  [address], [facility]   Phone [phone]   TROPONIN - Abnormal; Notable for the following components:    Troponin 0.36 (*)     All other components within normal limits    Narrative:     Performed at:  [address], [address]   Phone [phone]   TROPONIN - Abnormal; Notable for the following components:    Troponin 0.32 (*)     All other components within normal limits    Narrative:     Performed at:  [address], [address] [place]   Phone [phone]   APTT - Abnormal; Notable for the following components:    aPTT 118.0 (*)     All other components within normal limits    Narrative:     [address] [place] [address],  Chemistry results called to and read back by SAPPHIRE Bennett, 06/07/2021 06:56,  by Margoth Hsatings  Performed at:  [address], [facility] [place]   Phone [phone]   POCT GLUCOSE - Normal   CBC WITH AUTO DIFFERENTIAL    Narrative:     Performed at:  [address], [facility]   Phone [phone]   URINALYSIS    Narrative:     Performed at:  [address], [facility]   Phone [phone]   TROPONIN    Narrative:     Performed at:  [address], [facility] [place]   Phone [phone]   PROTIME-INR    Narrative:     Performed at:  [address], [address]   Phone [phone]   SAMPLE POSSIBLE BLOOD BANK TESTING    Narrative:     Performed at:  [address], [address]   Phone [phone]   MAGNESIUM    Narrative:     Performed at:  [address], [address]   Phone [phone]   CBC    Narrative:     Performed at:  [address], [address]   Phone [phone] Slight   dependent atelectasis. XR CHEST PORTABLE   Final Result   Wide mediastinum compared to prior exam.  This appearance may be due in part   to differences in technique. CTA chest recommended for better evaluation and   to exclude acute mediastinal process. CTA HEAD NECK W CONTRAST   Final Result   No acute arterial abnormality or hemodynamically significant arterial   stenosis in the head or neck. CT HEAD WO CONTRAST   Final Result   No acute intracranial abnormality. Critical results were called by Dr. Ajith Maurer to Rhonda Flowers on   6/6/2021 at 18:35. PROCEDURES   Unless otherwise noted below, none     Procedures    CRITICAL CARE TIME   Time: 45 minutes  Includes repeat examinations, speaking with consultants, lab interpretation, charting, assessing for acute stroke with concern for transient global amnesia. Also, treating for NSTEMI. Excludes separate billable procedures. Patient at risk for serious decompensation if not treated for this life-threatening condition. CONSULTS:  Spoke with Dr. Annika Hamilton at 1056 and he recommended against TPA. Spoke with cardiology with NP Carlos Hatfield at 2150 and he reviewed the EKG with cardiology and they did not recommend emergent catheterization but did recommend heparin at this point. Paged hospitalist for admission.   IP CONSULT TO STROKE TEAM  IP CONSULT TO HOSPITALIST  IP CONSULT TO CARDIOLOGY  IP CONSULT TO CARDIOLOGY  IP CONSULT TO NEUROLOGY    EMERGENCY DEPARTMENT COURSE and DIFFERENTIAL DIAGNOSIS/MDM:   Vitals:    Vitals:    06/07/21 0446 06/07/21 0715 06/07/21 1100 06/07/21 1500   BP: (!) 101/58 105/68 115/69 107/63   Pulse: 62 60 64 68   Resp: 16 16 16 16   Temp: 97.5 °F (36.4 °C) 97.7 °F (36.5 °C) 98.2 °F (36.8 °C) 98.3 °F (36.8 °C)   TempSrc: Oral Oral Oral Oral   SpO2: 99% 98% 95% 93%   Weight:       Height:           Patient was given the following medications:  Medications   sodium chloride flush 0.9 % injection 5-40 mL (5 mLs Intravenous Not Given 6/7/21 0808)   sodium chloride flush 0.9 % injection 5-40 mL (has no administration in time range)   0.9 % sodium chloride infusion (has no administration in time range)   ondansetron (ZOFRAN-ODT) disintegrating tablet 4 mg (has no administration in time range)     Or   ondansetron (ZOFRAN) injection 4 mg (has no administration in time range)   polyethylene glycol (GLYCOLAX) packet 17 g (has no administration in time range)   aspirin EC tablet 81 mg (81 mg Oral Given 6/7/21 0808)     Or   aspirin suppository 300 mg ( Rectal See Alternative 6/7/21 0808)   atorvastatin (LIPITOR) tablet 80 mg (80 mg Oral Given 6/6/21 2333)   labetalol (NORMODYNE;TRANDATE) injection 10 mg (has no administration in time range)   perflutren lipid microspheres (DEFINITY) injection 1.65 mg (has no administration in time range)   tiZANidine (ZANAFLEX) tablet 4 mg (has no administration in time range)   venlafaxine (EFFEXOR XR) extended release capsule 75 mg (75 mg Oral Given 6/7/21 0808)   calcium elemental (OSCAL) tablet 500 mg (has no administration in time range)   traZODone (DESYREL) tablet 50 mg (50 mg Oral Given 6/7/21 0012)   perflutren lipid microspheres (DEFINITY) injection 1.65 mg (has no administration in time range)   LORazepam (ATIVAN) injection 0.5 mg (0.5 mg Intravenous Given 6/6/21 1838)   0.9 % sodium chloride bolus (0 mLs Intravenous Stopped 6/6/21 1937)   iopamidol (ISOVUE-370) 76 % injection 75 mL (75 mLs Intravenous Given 6/6/21 1828)   hydrOXYzine (VISTARIL) capsule 50 mg (50 mg Oral Given 6/6/21 1905)   aspirin chewable tablet 324 mg (324 mg Oral Given 6/6/21 1953)   LORazepam (ATIVAN) injection 0.5 mg (0.5 mg Intravenous Given 6/6/21 1953)   heparin (porcine) injection 3,400 Units (3,400 Units Intravenous Given 6/6/21 2331)   LORazepam (ATIVAN) injection 0.5 mg (0.5 mg Intravenous Given 6/7/21 4780)   iopamidol (ISOVUE-370) 76 % injection 75 mL (75 mLs Intravenous Given 6/7/21 1246)     Patient was evaluated a concern for trouble with short-term memory just prior to arrival with concern for transient global amnesia. She had no focal neural deficits on exam and the only issue was with her repeating yourself multiple times since she would forget why she was at the hospital.  Initial EKG showed concern for possible sub under cardio ischemia although initial troponin was negative and she denied any chest pain or shorts or breath. Stroke team recommended against TPA based on symptoms being mild and risk of TPA outweighs the benefit at this point per stroke team based on transient global amnesia. Repeat EKG showed improvement in regards to ischemia. However, repeat troponin was elevated and therefore cardiology did recommend starting heparin. I did reevaluate the patient prior to admission and she was still having trouble with short-term memory although still another focal neural deficits on exam. She wanted further evaluation in the hospital in neurology and cardiology consultations. Cardiology did not recommend emergent catheterization related to the elevated troponin. She is stable for the floor with telemetry and the patient and her son feel comfortable with this plan. The patient tolerated their visit well. The patient and / or the family were informed of the results of any tests, a time was given to answer questions. FINAL IMPRESSION      1. NSTEMI (non-ST elevated myocardial infarction) ([address]    2. Transient global amnesia    3. Anxiety state    4. Elevated blood pressure reading    5. Abnormal EKG          DISPOSITION/PLAN   DISPOSITION Admitted 06/06/2021 09:07:42 PM      PATIENT REFERRED TO:  No follow-up provider specified.     DISCHARGEMEDICATIONS:  Current Discharge Medication List          DISCONTINUED MEDICATIONS:  Current Discharge Medication List                 (Please note that portions of this note were completed with a voicerecognition program. Efforts were made to edit the dictations but occasionally words are mis-transcribed.)    Adiel Morton MD (electronically signed)            Adiel Morton MD  06/07/21 Miri Sifuentes MD  07/09/21 0528

## 2021-06-07 ENCOUNTER — APPOINTMENT (OUTPATIENT)
Dept: CT IMAGING | Age: 76
DRG: 066 | End: 2021-06-07
Payer: MEDICARE

## 2021-06-07 ENCOUNTER — APPOINTMENT (OUTPATIENT)
Dept: MRI IMAGING | Age: 76
DRG: 066 | End: 2021-06-07
Payer: MEDICARE

## 2021-06-07 PROBLEM — G45.4 TGA (TRANSIENT GLOBAL AMNESIA): Status: ACTIVE | Noted: 2021-06-06

## 2021-06-07 LAB
APTT: 118 SEC (ref 24.2–36.2)
APTT: 33.7 SEC (ref 24.2–36.2)
CHOLESTEROL, TOTAL: 180 MG/DL (ref 0–199)
EKG ATRIAL RATE: 76 BPM
EKG ATRIAL RATE: 95 BPM
EKG DIAGNOSIS: NORMAL
EKG DIAGNOSIS: NORMAL
EKG P AXIS: 59 DEGREES
EKG P AXIS: 68 DEGREES
EKG P-R INTERVAL: 162 MS
EKG P-R INTERVAL: 246 MS
EKG Q-T INTERVAL: 362 MS
EKG Q-T INTERVAL: 382 MS
EKG QRS DURATION: 92 MS
EKG QRS DURATION: 94 MS
EKG QTC CALCULATION (BAZETT): 429 MS
EKG QTC CALCULATION (BAZETT): 454 MS
EKG R AXIS: 38 DEGREES
EKG R AXIS: 66 DEGREES
EKG T AXIS: 52 DEGREES
EKG T AXIS: 69 DEGREES
EKG VENTRICULAR RATE: 76 BPM
EKG VENTRICULAR RATE: 95 BPM
ESTIMATED AVERAGE GLUCOSE: 102.5 MG/DL
HBA1C MFR BLD: 5.2 %
HCT VFR BLD CALC: 38.5 % (ref 36–48)
HDLC SERPL-MCNC: 67 MG/DL (ref 40–60)
HEMOGLOBIN: 13 G/DL (ref 12–16)
LDL CHOLESTEROL CALCULATED: 99 MG/DL
MCH RBC QN AUTO: 30.3 PG (ref 26–34)
MCHC RBC AUTO-ENTMCNC: 33.8 G/DL (ref 31–36)
MCV RBC AUTO: 89.7 FL (ref 80–100)
PDW BLD-RTO: 13 % (ref 12.4–15.4)
PLATELET # BLD: 217 K/UL (ref 135–450)
PMV BLD AUTO: 7.5 FL (ref 5–10.5)
RBC # BLD: 4.29 M/UL (ref 4–5.2)
TRIGL SERPL-MCNC: 72 MG/DL (ref 0–150)
TROPONIN: 0.32 NG/ML
TROPONIN: 0.36 NG/ML
TROPONIN: 0.39 NG/ML
VLDLC SERPL CALC-MCNC: 14 MG/DL
WBC # BLD: 6.5 K/UL (ref 4–11)

## 2021-06-07 PROCEDURE — 6360000002 HC RX W HCPCS: Performed by: INTERNAL MEDICINE

## 2021-06-07 PROCEDURE — 6370000000 HC RX 637 (ALT 250 FOR IP): Performed by: PEDIATRICS

## 2021-06-07 PROCEDURE — 93010 ELECTROCARDIOGRAM REPORT: CPT | Performed by: INTERNAL MEDICINE

## 2021-06-07 PROCEDURE — 99222 1ST HOSP IP/OBS MODERATE 55: CPT | Performed by: INTERNAL MEDICINE

## 2021-06-07 PROCEDURE — 2580000003 HC RX 258: Performed by: PEDIATRICS

## 2021-06-07 PROCEDURE — 84484 ASSAY OF TROPONIN QUANT: CPT

## 2021-06-07 PROCEDURE — 85730 THROMBOPLASTIN TIME PARTIAL: CPT

## 2021-06-07 PROCEDURE — 71275 CT ANGIOGRAPHY CHEST: CPT

## 2021-06-07 PROCEDURE — 97161 PT EVAL LOW COMPLEX 20 MIN: CPT

## 2021-06-07 PROCEDURE — 99222 1ST HOSP IP/OBS MODERATE 55: CPT | Performed by: PSYCHIATRY & NEUROLOGY

## 2021-06-07 PROCEDURE — 83036 HEMOGLOBIN GLYCOSYLATED A1C: CPT

## 2021-06-07 PROCEDURE — 1200000000 HC SEMI PRIVATE

## 2021-06-07 PROCEDURE — 6370000000 HC RX 637 (ALT 250 FOR IP): Performed by: NURSE PRACTITIONER

## 2021-06-07 PROCEDURE — 6360000004 HC RX CONTRAST MEDICATION: Performed by: INTERNAL MEDICINE

## 2021-06-07 PROCEDURE — 80061 LIPID PANEL: CPT

## 2021-06-07 PROCEDURE — 85027 COMPLETE CBC AUTOMATED: CPT

## 2021-06-07 PROCEDURE — 6360000002 HC RX W HCPCS: Performed by: NURSE PRACTITIONER

## 2021-06-07 PROCEDURE — 70551 MRI BRAIN STEM W/O DYE: CPT

## 2021-06-07 PROCEDURE — 36415 COLL VENOUS BLD VENIPUNCTURE: CPT

## 2021-06-07 RX ORDER — HEPARIN SODIUM 10000 [USP'U]/100ML
340 INJECTION, SOLUTION INTRAVENOUS CONTINUOUS
Status: DISCONTINUED | OUTPATIENT
Start: 2021-06-07 | End: 2021-06-07

## 2021-06-07 RX ORDER — LORAZEPAM 2 MG/ML
0.5 INJECTION INTRAMUSCULAR ONCE
Status: COMPLETED | OUTPATIENT
Start: 2021-06-07 | End: 2021-06-07

## 2021-06-07 RX ADMIN — Medication 10 ML: at 21:52

## 2021-06-07 RX ADMIN — TRAZODONE HYDROCHLORIDE 50 MG: 50 TABLET ORAL at 00:12

## 2021-06-07 RX ADMIN — HEPARIN SODIUM 340 UNITS/HR: 10000 INJECTION, SOLUTION INTRAVENOUS at 08:03

## 2021-06-07 RX ADMIN — VENLAFAXINE HYDROCHLORIDE 75 MG: 37.5 CAPSULE, EXTENDED RELEASE ORAL at 08:08

## 2021-06-07 RX ADMIN — IOPAMIDOL 75 ML: 755 INJECTION, SOLUTION INTRAVENOUS at 12:46

## 2021-06-07 RX ADMIN — LORAZEPAM 0.5 MG: 2 INJECTION, SOLUTION INTRAMUSCULAR; INTRAVENOUS at 00:54

## 2021-06-07 RX ADMIN — ASPIRIN 81 MG: 81 TABLET, COATED ORAL at 08:08

## 2021-06-07 RX ADMIN — ATORVASTATIN CALCIUM 80 MG: 80 TABLET, FILM COATED ORAL at 21:52

## 2021-06-07 ASSESSMENT — ENCOUNTER SYMPTOMS
ABDOMINAL PAIN: 0
RHINORRHEA: 0
DIARRHEA: 0
SORE THROAT: 0
NAUSEA: 0
CONSTIPATION: 0
BLOOD IN STOOL: 0
PHOTOPHOBIA: 0
VOMITING: 0
EYE PAIN: 0
SHORTNESS OF BREATH: 0
COUGH: 0

## 2021-06-07 ASSESSMENT — PAIN SCALES - GENERAL
PAINLEVEL_OUTOF10: 0

## 2021-06-07 NOTE — PROGRESS NOTES
Physical Therapy    Facility/Department: [facility] B3 - MED SURG  Initial Assessment and Discharge Summary    NAME: Ozzy Brewer  : 1945  MRN: [id number]    Date of Service: 2021    Discharge Recommendations:  Home with assist PRN   PT Equipment Recommendations  Equipment Needed: No    Assessment   Body structures, Functions, Activity limitations: Decreased functional mobility   Assessment: Pt is a 76 y/o female presenting to [facility] with confusion. Of note, pt had trending troponins up upon admission. PTA, pt was IND with all functional mobility without AD and living alone. Pt's son states he cognition is much better this morning, though she hasn't been awake for long. Pt demos equal strength and sensation on the BLE with no LOB with ambulation without an AD. Pt appears at her baseline function and has no further acute PT needs and will be discharged from caseload. If pt's status changes, please reach out. Thank you. Treatment Diagnosis: Impaired cognition/safety, decreased functional mobility  Prognosis: Excellent  Decision Making: Low Complexity  PT Education: Goals;PT Role;Plan of Care; Functional Mobility Training;Disease Specific Education  Patient Education: Pt verbalized understanding; Educated on findings during session  REQUIRES PT FOLLOW UP: No  Activity Tolerance  Activity Tolerance: Patient Tolerated treatment well  Activity Tolerance: BW=660/74, HR=61, SpO2=92%       Patient Diagnosis(es): The primary encounter diagnosis was NSTEMI (non-ST elevated myocardial infarction) ([address] Diagnoses of Transient global amnesia, Anxiety state, Elevated blood pressure reading, and Abnormal EKG were also pertinent to this visit. has a past medical history of Anesthesia complication, Anxiety, Atrial fibrillation ([address] CAD (coronary artery disease), and Rotator cuff tendinitis. has a past surgical history that includes Atrial ablation surgery (3/2012).     Restrictions  Restrictions/Precautions Restrictions/Precautions: General Precautions, Up as Tolerated  Vision/Hearing  Vision: Impaired  Vision Exceptions: Wears glasses for reading (+driving at night)  Hearing: Within functional limits     Subjective  General  Chart Reviewed: Yes  Patient assessed for rehabilitation services?: Yes  Response To Previous Treatment: Not applicable  Family / Caregiver Present: Yes (Son)  Referring Practitioner: Marisela Boo MD  Referral Date : 06/06/21  Subjective  Subjective: Pt agreeable to therapy. Denies confusion at this time. Pain Screening  Patient Currently in Pain: Denies  Vital Signs  Patient Currently in Pain: Denies       Orientation  Orientation  Overall Orientation Status: Within Functional Limits  Social/Functional History  Social/Functional History  Lives With: Alone  Type of Home: House  Home Layout: Two level, Bed/Bath upstairs, 1/2 bath on main level  Home Access: Stairs to enter without rails  Entrance Stairs - Number of Steps: 1 ONEYDA  Bathroom Shower/Tub: Walk-in shower  Bathroom Toilet: Standard  Home Equipment:  (no DME)  Receives Help From:  (Some cleans 1x/month)  ADL Assistance: Independent  Homemaking Assistance: Independent  Homemaking Responsibilities: Yes  Ambulation Assistance: Independent  Transfer Assistance: Independent  Active : Yes  Leisure & Hobbies: Read, scrapbook  Additional Comments: Denies falls in past 6 months  Cognition   Cognition  Overall Cognitive Status: WFL    Objective          AROM RLE (degrees)  RLE AROM: WFL  AROM LLE (degrees)  LLE AROM : WFL  Strength RLE  Strength RLE: WFL  Comment: Grossly 4/5  Strength LLE  Comment: Grossly 4/5  Tone RLE  RLE Tone: Normotonic  Tone LLE  LLE Tone: Normotonic  Sensation  Overall Sensation Status: WFL  Bed mobility  Supine to Sit: Modified independent  Sit to Supine: Modified independent  Comment:  Mod I with HOB elevated to 20 degrees; no rails used  Transfers  Sit to Stand: Modified independent  Stand to sit: Modified independent  Bed to Chair: Modified independent  Comment: Mod I with assist for IV pole and one cue for positioning. Without AD, no LOB. Ambulation  Ambulation?: Yes  More Ambulation?: No  Ambulation 1  Surface: level tile  Device: No Device  Assistance: Modified Independent  Quality of Gait: Slight decrease in guy, no LOB  Distance: 10 feet + 50 feet     Balance  Posture: Fair  Sitting - Static: Good  Sitting - Dynamic: Good  Standing - Static: Good;-  Standing - Dynamic: Good;-  Comments: without AD        Plan   Plan  Times per week: 1x only  Safety Devices  Type of devices: All fall risk precautions in place, Bed alarm in place, Call light within reach, Left in bed, Patient at risk for falls, Gait belt, Nurse notified    AM-PAC Score  AM-PAC Inpatient Mobility Raw Score : 23 (06/07/21 0853)  AM-PAC Inpatient T-Scale Score : 56.93 (06/07/21 0853)  Mobility Inpatient CMS 0-100% Score: 11.2 (06/07/21 0853)  Mobility Inpatient CMS G-Code Modifier : CI (06/07/21 0853)        Goals  Short term goals  Time Frame for Short term goals: All goals met 6/7  Short term goal 1: Pt will be Mod I for transfers without AD. Short term goal 2: Pt will ambulate 50 feet with Mod I and no AD. Patient Goals   Patient goals :  To go home     Therapy Time   Individual Concurrent Group Co-treatment   Time In 0819         Time Out 0835         Minutes 16         Timed Code Treatment Minutes: 6 Minutes (6 minutes of unbillable time- no treatment given, pt IND; 10 minute eval)     Hernando Russell, PT

## 2021-06-07 NOTE — PLAN OF CARE
Problem: Infection:  Goal: Will remain free from infection  Description: Will remain free from infection  6/7/2021 0457 by Virginia Miranda RN  Outcome: Ongoing  6/6/2021 2257 by Virginia Miranda RN  Outcome: Ongoing     Problem: Safety:  Goal: Free from accidental physical injury  Description: Free from accidental physical injury  6/7/2021 0457 by Virginia Miranda RN  Outcome: Ongoing  6/6/2021 2257 by Virginia Miranda RN  Outcome: Ongoing  Goal: Free from intentional harm  Description: Free from intentional harm  6/7/2021 0457 by Virginia Miranda RN  Outcome: Ongoing  6/6/2021 2257 by Virginia Miranda RN  Outcome: Ongoing     Problem: Daily Care:  Goal: Daily care needs are met  Description: Daily care needs are met  6/7/2021 0457 by Virginia Miranda RN  Outcome: Ongoing  6/6/2021 2257 by Virginia Miranda RN  Outcome: Ongoing

## 2021-06-07 NOTE — CONSULTS
[address] [place] [address]  [phone]      Attending Physician: Germán Gayle MD  Reason for Consultation/Chief Complaint: Elevated troponin    Subjective   History of Present Illness:  Keagan Andrade is a 77 y.o. female with a history of paroxysmal atrial fibrillation s/p cryoablation on 3/9/12 (no longer on anticoagluation), PVCs, anxiety, and previous tobacco use admitted with confusion. The patient reports that she attended a [employer] with her family yesterday and afterwards went to get some coffee. She usually drinks decaf, but says that her coffee yesterday had caffeine which sometimes makes her jittery and anxious. She went home and took a nap, but when she woke up her son, who is currently present at bedside, noticed that she was very confused and \"shakey\"and did not recognize many of the objects in her home. The patient does not recall all of these details, but denies any associated weakness, numbness, or tingling. She further denies any associated chest pain, shortness of breath, palpitations, lightheadedness, or dizziness. On arrival to the ED, she was initially hypertensive to 178/96. There was concern for possible TIA versus CVA and a non-contrast head CT was obtained and was negative for acute bleed. CTA head/neck was negative for any large vessel occlusions. Brain MRI has been ordered and remains pending. Her ECG showed sinus rhythm with first degree AV block, incomplete right bundle branch block, and inferior and anterolateral ST depression. Her troponins trended 0.13-->0.34-->0.39-->0.36 and she was started on an IV heparin infusion. Currently, her BP is controlled and her confusion seems to have resolved. She continues to deny and chest pain or shortness of breath. Cardiology has been consulted for further evaluation of her elevated troponin.           Past Medical History:   has a past medical history of Anesthesia complication, Anxiety, Atrial fibrillation ([address] CAD (coronary artery disease), and Rotator cuff tendinitis. Surgical History:   has a past surgical history that includes Atrial ablation surgery (3/2012). Social History:   reports that she quit smoking about 42 years ago. She has never used smokeless tobacco. She reports current alcohol use of about 5.0 standard drinks of alcohol per week. She reports that she does not use drugs. Family History:  No family history of early onset CAD or sudden cardiac death. Home Medications:  Were reviewed and are listed in nursing record and/or below  Prior to Admission medications    Medication Sig Start Date End Date Taking? Authorizing Provider   ibuprofen (ADVIL;MOTRIN) 200 MG tablet Take 200 mg by mouth every 6 hours as needed for Pain   Yes Historical Provider, MD   calcium carbonate 600 MG TABS tablet Take 1 tablet by mouth 2 times daily as needed   Yes Historical Provider, MD   venlafaxine (EFFEXOR-XR) 75 MG XR capsule Take 75 mg by mouth daily. Yes Historical Provider, MD   tiZANidine (ZANAFLEX) 4 MG tablet Take 1 tablet by mouth 2 times daily as needed (muscle spasms) 11/12/18   BERNA Odonnell   diclofenac (VOLTAREN) 75 MG EC tablet Take 1 tablet by mouth 2 times daily 1/4/16   Cornelius Elizondo MD   aspirin 81 MG tablet Take 81 mg by mouth daily.     Historical Provider, MD        CURRENT Medications:  heparin 25,000 units in dextrose 5% 250 mL (premix) infusion, Continuous  sodium chloride flush 0.9 % injection 5-40 mL, 2 times per day  sodium chloride flush 0.9 % injection 5-40 mL, PRN  0.9 % sodium chloride infusion, PRN  ondansetron (ZOFRAN-ODT) disintegrating tablet 4 mg, Q8H PRN   Or  ondansetron (ZOFRAN) injection 4 mg, Q6H PRN  polyethylene glycol (GLYCOLAX) packet 17 g, Daily PRN  aspirin EC tablet 81 mg, Daily   Or  aspirin suppository 300 mg, Daily  atorvastatin (LIPITOR) tablet 80 mg, Nightly  labetalol (NORMODYNE;TRANDATE) injection 10 mg, Q10 Min PRN  perflutren lipid microspheres (DEFINITY) injection 1.65 mg, ONCE PRN  tiZANidine (ZANAFLEX) tablet 4 mg, BID PRN  venlafaxine (EFFEXOR XR) extended release capsule 75 mg, Daily  calcium elemental (OSCAL) tablet 500 mg, BID PRN  heparin (porcine) injection 3,400 Units, PRN  heparin (porcine) injection 1,700 Units, PRN  traZODone (DESYREL) tablet 50 mg, Nightly PRN        Allergies:  Cephalexin and Pcn [penicillins]     Review of Systems:   A 14 point review of symptoms completed. Pertinent positives identified in the HPI, all other review of symptoms negative as below. Review of Systems   Constitutional: Negative for chills and fever. HENT: Negative for congestion, rhinorrhea and sore throat. Eyes: Negative for photophobia, pain and visual disturbance. Respiratory: Negative for cough and shortness of breath. Cardiovascular: Negative for chest pain, palpitations and leg swelling. Gastrointestinal: Negative for abdominal pain, blood in stool, constipation, diarrhea, nausea and vomiting. Endocrine: Negative for cold intolerance and heat intolerance. Genitourinary: Negative for difficulty urinating, dysuria and hematuria. Musculoskeletal: Negative for arthralgias, joint swelling and myalgias. Skin: Negative for rash and wound. Allergic/Immunologic: Negative for environmental allergies and food allergies. Neurological: Negative for dizziness, syncope, facial asymmetry, weakness, light-headedness and numbness. Positive for confusion. Hematological: Negative for adenopathy. Does not bruise/bleed easily. Psychiatric/Behavioral: Negative for dysphoric mood. The patient is not nervous/anxious. Objective   PHYSICAL EXAM:    Vitals:    06/07/21 0715   BP: 105/68   Pulse: 60   Resp: 16   Temp: 97.7 °F (36.5 °C)   SpO2: 98%    Weight: 131 lb (59.4 kg)       General: Adult female in no acute distress.  Pleasant and interactive on exam.  HEENT: Normocephalic, atraumatic, non-icteric, hearing intact, evaluation of   pericardial effusion. Royce Caro is a small hypoechoic space anterior to   the heart which may represent a small pericardial effusion versus   pericardial fat pad.       CONCLUSIONS-  Possible small anteroapical pericardial effusion   versus anterior fat pad. TTE 12/19/13:  Conclusions    Summary    Normal left ventricle size, wall thickness and systolic function with an    estimated ejection fraction of 55%. No regional wall motion abnormalities    are seen. Normal diastolic function.    The mitral valve is normal in structure and function. Trace of mitral    regurgitation.    The aortic valve is normal in structure and function. No evidence of aortic    valve regurgitation.    The tricuspid valve is normal in structure and function. Trivial tricuspid    regurgitation.    The IVC is normal .    no intracardiac mass, vegetations or thrombi.       Stress Test:  [facility] Nuclear SPECT Stress 6/30/09:  CONCLUSIONS-   1. Limited transesophageal echocardiogram but overall left   ventricular function does appear to be reduced.       2. No evidence of intracardiac thrombus.       Cath:  [facility] 3/12/12:  Normal coronary arteries    Other Studies:   Non-contrast head CT 6/6/21:  No acute intracranial abnormality. CTA head/neck 6/6/21:  No acute arterial abnormality or hemodynamically significant arterial   stenosis in the head or neck. Assessment and Plan      1. Troponin elevation - Etiology of troponin elevation is not entirely clear at this time. Patient denies anginal symptoms and her transient confusion and mental status changes raise concern for possible TIA versus acute CVA which can be associated with mild troponin elevations and ST changes on ECG.   -Since troponins are now downtrending and patient continues to deny angina, would recommend discontinuing IV heparin infusion as the risks for possible hemorrhagic transformation in the setting of an acute CVA likely outweigh potential benefits given absence of clinical or objective active ischemia at this time  -Will obtain transthoracic echocardiogram with bubble study to evaluate cardiac structure and function and rule out atrial level shunting  -Additionally, her chest x-ray shows evidence of mediastinal widening and would also recommend chest CTA to further evaluate for aortic dissection and other other potential acute mediastinal abnormality    -Pending results of echo, brain MRI, and CTA, can consider additional ischemic evaluation with nuclear SPECT stress testing versus invasive angiography for further cardiac risk stratification as indicated  -Continue aspirin 81 mg daily and atorvastatin 80 mg daily  -OK to stop trending troponins  -Continue to monitor on telemetry    2. Altered mental status/concern for TIA versus acute CVA - Overall, confusion seems to have resolved and mental status seems to be back at baseline. Non-contrast head CT was negative for evidence of acute bleed and no large vessel occlusions were seen on CTA head/neck. -Recommend neurology consult   -Brain MRI pending  -On aspirin and high-intensity statin     3. Paroxysmal atrial fibrillation - S/p cryoablation in 2012. Anticoagulation previously discontinued by EP.  -No recurrent a-fib thus far observed on telemetry, but cannot rule out recent paroxysms and possible cardioembolic source of suspected TIA/CVA above  -Obtaining echo with bubble  -Continue to monitor on telemetry while inpatient and pending clinical course, will consider 30-day cardiac event monitor at discharge to further evaluate for paroxysmal atrial fibrillation  -Will continue to hold off on initiating indefinite anticoagulation for now    4. Elevated BP - BP elevated on admission, but is currently controlled at 105/68.   No previous formal diagnosis of hypertension and was not on antihypertensive agents as outpatient.    -Will continue to monitor and if BP elevated on repeat evaluation can plan to initiate antihypertensive therapy    5. Mediastinal widening on chest x-ray  -Obtaining chest CTA for further evaluation    6. Previous tobacco use  -Continue to encourage cessation      Thank you for allowing us to participate in the care of Scott Barr. Please call me with any questions [phone]. DO Damion Peterson 81  [phone] [facility]  [phone] [address] [place] [address]  6/7/2021 10:01 AM      I will address the patient's cardiac risk factors and adjusted pharmacologic treatment as needed. In addition, I have reinforced the need for patient directed risk factor modification. All questions and concerns were addressed to the patient/family. Alternatives to my treatment were discussed. The note was completed using EMR. Every effort was made to ensure accuracy; however, inadvertent computerized transcription errors may be present.

## 2021-06-07 NOTE — CONSULTS
In patient Neurology consult        [facility] Neurology      MD Lion Reis  1945    Date of Service: 2021    Referring Physician: Adam Trevizo MD      Reason for the consult and CC: Acute confusion and amnesia    HPI:   The patient is a 77y.o.  years old female who was admitted to the hospital yesterday with acute amnesia. Symptoms started yesterday around 1500. She had a sudden onset of short-term memory of the event occurred the same day. She forgot going to the [employer] and attend [facility] and having lunch with her family. She could member coming to the emergency room after her son took her prior Degree was severe and persistent for several hours. She was able to follow simple directions. Baseline she is independent. No other relieving or aggravating factors. No recent fever, chills or change in medications. No clear triggers. Initial work-up with CT showed no acute changes. She was admitted  Today she feels much better. She is back to her baseline. No residual deficit. She denies any headache, chest pain, dysphagia or dysarthria. Other review of system was unremarkable      History reviewed. No pertinent family history. Past Surgical History:   Procedure Laterality Date    ATRIAL ABLATION SURGERY  3/2012        Past Medical History:   Diagnosis Date    Anesthesia complication     Low B/P after atrial ablation (SBP 60)    Anxiety     Atrial fibrillation (HCC)     CAD (coronary artery disease) 12    at fib    Rotator cuff tendinitis 2/15/2016     Social History     Tobacco Use    Smoking status: Former Smoker     Quit date: 1979     Years since quittin.3    Smokeless tobacco: Never Used   Substance Use Topics    Alcohol use:  Yes     Alcohol/week: 5.0 standard drinks     Types: 5 Glasses of wine per week    Drug use: No     Allergies   Allergen Reactions    Cephalexin Hives and Swelling    Pcn [Penicillins] Hives and Swelling Current Facility-Administered Medications   Medication Dose Route Frequency Provider Last Rate Last Admin    perflutren lipid microspheres (DEFINITY) injection 1.65 mg  1.5 mL Intravenous ONCE PRN Justice Edwards DO        sodium chloride flush 0.9 % injection 5-40 mL  5-40 mL Intravenous 2 times per day Gardenia Guzmán MD   10 mL at 06/06/21 2333    sodium chloride flush 0.9 % injection 5-40 mL  5-40 mL Intravenous PRN Shiva Oscar MD        0.9 % sodium chloride infusion  25 mL Intravenous PRN Shiva Oscar MD        ondansetron (ZOFRAN-ODT) disintegrating tablet 4 mg  4 mg Oral Q8H PRN Shiva Oscar MD        Or    ondansetron (ZOFRAN) injection 4 mg  4 mg Intravenous Q6H PRN Shiva Oscar MD        polyethylene glycol (GLYCOLAX) packet 17 g  17 g Oral Daily PRN Gardenia Guzmán MD        aspirin EC tablet 81 mg  81 mg Oral Daily Gardenia Guzmán MD   81 mg at 06/07/21 6040    Or    aspirin suppository 300 mg  300 mg Rectal Daily Shiva Oscar MD        atorvastatin (LIPITOR) tablet 80 mg  80 mg Oral Nightly Shiva Oscar MD   80 mg at 06/06/21 2333    labetalol (NORMODYNE;TRANDATE) injection 10 mg  10 mg Intravenous Q10 Min PRN Shiva Oscar MD        perflutren lipid microspheres (DEFINITY) injection 1.65 mg  1.5 mL Intravenous ONCE PRN Shiva Oscar MD        tiZANidine (ZANAFLEX) tablet 4 mg  4 mg Oral BID PRN Gardenia Guzmán MD        venlafaxine (EFFEXOR XR) extended release capsule 75 mg  75 mg Oral Daily Shiva Oscar MD   75 mg at 06/07/21 0808    calcium elemental (OSCAL) tablet 500 mg  500 mg Oral BID PRN Gardenia Guzmán MD        traZODone (DESYREL) tablet 50 mg  50 mg Oral Nightly PRN MARKUS Green - CNP   50 mg at 06/07/21 0012       ROS : A 10-14 system review of constitutional, cardiovascular, respiratory, eyes, musculoskeletal, endocrine, GI, ENT, skin, hematological, genitourinary, psychiatric and neurologic systems was obtained and updated today and is unremarkable except as mentioned in my HPI      Exam:     Constitutional:   Vitals:    06/07/21 0446 06/07/21 0715 06/07/21 1100 06/07/21 1500   BP: (!) 101/58 105/68 115/69 107/63   Pulse: 62 60 64 68   Resp: 16 16 16 16   Temp: 97.5 °F (36.4 °C) 97.7 °F (36.5 °C) 98.2 °F (36.8 °C) 98.3 °F (36.8 °C)   TempSrc: Oral Oral Oral Oral   SpO2: 99% 98% 95% 93%   Weight:       Height:           General appearance:  Normal development and appear in no acute distress. Eye:  Fundus of the eye: Funduscopic examination cannot be performed due to COVID19 restrictions and precautions. Neck: supple  Cardiovascular: No lower leg edema with good pulsation. Mental Status:   Oriented to person, place, problem, and time. Memory: Good immediate recall. Intact remote memory  Normal attention span and concentration. Language: intact naming, repeating and fluency   Good fund of Knowledge. Aware of current events and vocabulary   Cranial Nerves:   II: Visual fields: Full. Pupils: equal, round, reactive to light  III,IV,VI: Extra Ocular Movements are intact. No nystagmus  V: Facial sensation is intact   VII: Facial strength and movements: intact and symmetric  VIII: Hearing: Intact  IX: Palate elevation is symmetric  XI: Shoulder shrug is intact  XII: Tongue movements are normal  Musculoskeletal: 5/5 in all 4 extremities. Tone: Normal tone. Reflexes: 2+ in the upper extremity and 2+ in the lower extremity   Planters: flexor bilaterally. Coordination: no pronator drift, no dysmetria with FNF in upper extremities. Normal REM. Sensation: normal to all modalities in both arms and legs.   Gait/Posture: steady gait    Data:  LABS:   Lab Results   Component Value Date     06/06/2021    K 4.0 06/06/2021    CL 99 06/06/2021    CO2 24 06/06/2021    BUN 23 06/06/2021    CREATININE 0.8 06/06/2021    GFRAA >60 06/06/2021    GFRAA >60 03/13/2012    LABGLOM >60 06/06/2021 GLUCOSE 92 06/06/2021    MG 1.90 06/06/2021    CALCIUM 9.8 06/06/2021     Lab Results   Component Value Date    WBC 6.5 06/07/2021    RBC 4.29 06/07/2021    HGB 13.0 06/07/2021    HCT 38.5 06/07/2021    MCV 89.7 06/07/2021    RDW 13.0 06/07/2021     06/07/2021     Lab Results   Component Value Date    INR 1.04 06/06/2021    PROTIME 12.1 06/06/2021       Neuroimaging were independently reviewed by me and discussed results with the patient  Reviewed notes from different physicians  Reviewed lab and blood testing    Impression:  Acute amnesia, severe. Likely transient global amnesia versus TIA/CVA. Will need MRI of the brain to rule out new ischemic stroke  Hyperlipidemia  Depression    Recommendation:  MRI brain  Neurochecks  Aspirin  Statin  DVT and GI prophylaxis  Follow troponin  Stroke prevention was discussed with the patient today  Telemetry  If MRI showed acute stroke, she will need echocardiogram  DC planning after above recommendation      Thank you for referring such patient. If you have any questions regarding my consult note, please don't hesitate to call me. Padmaja James MD  [phone]    This dictation was generated by voice recognition computer software.  Although all attempts are made to edit the dictation for accuracy, there may be errors in the  transcription that are not intended

## 2021-06-07 NOTE — CONSULTS
Pharmacy to Manage Heparin Infusion per [facility]     Dx: Elevated Troponin  Pt wt = 56.7 kg. Baseline aPTT = Pending.     Low Dose Heparin Infusion  Heparin 60 units/kg IVP bolus followed by Heparin infusion at 12 units/kg/hr (recommended initial max dose 1000 units./hr). Recheck aPTT in 6 hours. Goal aPTT = 49-76 seconds. Lisa Urias PharmD  6/6/2021 10:16 PM    6/7/2021  Recent Labs     06/07/21  0619   APTT 118.0*     Hold heparin gtt for 1 hour and decrease heparin gtt to 3.4 mL/hr. Recheck aPTT in 6 hours.   Lisa Urias PharmD  6/7/2021 6:59 AM

## 2021-06-07 NOTE — SIGNIFICANT EVENT
H&P note addendum - Heparin drip added per cardiology recommendations   TNI with up trend <0.01, 0.13, 0.34 - continue to trend     Miriam Machuca MD

## 2021-06-07 NOTE — PROGRESS NOTES
Speech Language Pathology  Attempt Note    Order received for ST millan. Chart reviewed. Spoke with RN who states pt is about to leave floor for CTA. Rn reports pt on regular solids and thin liquids with no signs of dysphagia. RN also reports pt's confusion and recall are improving. ST to attempt at late time as ST and pt schedule allow. Tamera Lebrno. Cristian Dinh, M.RADHA.  [id number] [facility]  Speech-Language Pathologist

## 2021-06-07 NOTE — PROGRESS NOTES
II-XII intact, grossly non-focal.  Psychiatric: Alert and oriented, thought content appropriate, normal insight  Capillary Refill: Brisk,3 seconds, normal   Peripheral Pulses: +2 palpable, equal bilaterally       Labs:   Recent Labs     06/06/21  1806 06/07/21 0619   WBC 7.2 6.5   HGB 14.1 13.0   HCT 42.0 38.5    217     Recent Labs     06/06/21  1806      K 4.0   CL 99   CO2 24   BUN 23*   CREATININE 0.8   CALCIUM 9.8     Recent Labs     06/06/21  1806   AST 28   ALT 21   BILITOT <0.2   ALKPHOS 78     Recent Labs     06/06/21  1806   INR 1.04     Recent Labs     06/07/21  0240 06/07/21  0619 06/07/21  1030   TROPONINI 0.39* 0.36* 0.32*       Urinalysis:      Lab Results   Component Value Date    NITRU Negative 06/06/2021    BLOODU Negative 06/06/2021    SPECGRAV 1.010 06/06/2021    GLUCOSEU Negative 06/06/2021       Radiology:  CTA CHEST W CONTRAST   Final Result   No evidence of pulmonary embolism or acute pulmonary abnormality. No   mediastinal hematoma, adenopathy, or other significant abnormality. Slight   dependent atelectasis. XR CHEST PORTABLE   Final Result   Wide mediastinum compared to prior exam.  This appearance may be due in part   to differences in technique. CTA chest recommended for better evaluation and   to exclude acute mediastinal process. CTA HEAD NECK W CONTRAST   Final Result   No acute arterial abnormality or hemodynamically significant arterial   stenosis in the head or neck. CT HEAD WO CONTRAST   Final Result   No acute intracranial abnormality. Critical results were called by Dr. Stephania Beaver to [facility] on   6/6/2021 at 18:35. MRI brain without contrast    (Results Pending)           Assessment/Plan:    Active Hospital Problems    Diagnosis     Memory loss [R41.3]      Transient global amnesia / memory loss / confusion: Cause unclear. ? TIA/ acute CVA. Mentation seems at baseline. MRI brain, cardiac echo pending. Neurology consulted. Continue aspirin, statin, PT/OT        Elevated trop: cardio recs appreciated, heparin ggt d/markos. Awaiting MRI brain, cardiac echo  and CTA pulm to determine further ischemic work up needed. Hx of A fib : Status post ablation in past.  No longer on AC. Cardio recs appreciated. Continue telemetry. 30-day event monitor at discharge. Elevated blood pressure: no prior hx of BP.    - monitor, treat if BP elevated > 220 / 120 with prn labetalol      Azotemia:   - support with IVF    - no blood in stool noted. Repeat BMP in a.m     Mood disorder:   - venlafaxine 75 mg po daily      Muscle spasms:   - tizanidine 4 mg po BID PRN      Reflux:   - calcium carbonate 600 mg po BID PRN     DVT Prophylaxis: Lovenox  Diet: ADULT DIET;  Regular  Code Status: Full Code    PT/OT Eval Status: Ordered    Dispo -1 to 3 days pending clinical course    Lynn Monzon MD

## 2021-06-08 ENCOUNTER — TELEPHONE (OUTPATIENT)
Dept: CARDIOLOGY | Age: 76
End: 2021-06-08

## 2021-06-08 VITALS
OXYGEN SATURATION: 94 % | HEIGHT: 65 IN | RESPIRATION RATE: 16 BRPM | SYSTOLIC BLOOD PRESSURE: 111 MMHG | WEIGHT: 131 LBS | DIASTOLIC BLOOD PRESSURE: 67 MMHG | HEART RATE: 73 BPM | TEMPERATURE: 98.4 F | BODY MASS INDEX: 21.83 KG/M2

## 2021-06-08 LAB
ANION GAP SERPL CALCULATED.3IONS-SCNC: 8 MMOL/L (ref 3–16)
BUN BLDV-MCNC: 25 MG/DL (ref 7–20)
CALCIUM SERPL-MCNC: 9.2 MG/DL (ref 8.3–10.6)
CHLORIDE BLD-SCNC: 102 MMOL/L (ref 99–110)
CO2: 28 MMOL/L (ref 21–32)
CREAT SERPL-MCNC: 0.8 MG/DL (ref 0.6–1.2)
GFR AFRICAN AMERICAN: >60
GFR NON-AFRICAN AMERICAN: >60
GLUCOSE BLD-MCNC: 91 MG/DL (ref 70–99)
LV EF: 58 %
LVEF MODALITY: NORMAL
POTASSIUM SERPL-SCNC: 4.3 MMOL/L (ref 3.5–5.1)
SODIUM BLD-SCNC: 138 MMOL/L (ref 136–145)

## 2021-06-08 PROCEDURE — 80048 BASIC METABOLIC PNL TOTAL CA: CPT

## 2021-06-08 PROCEDURE — 6370000000 HC RX 637 (ALT 250 FOR IP): Performed by: PEDIATRICS

## 2021-06-08 PROCEDURE — 6370000000 HC RX 637 (ALT 250 FOR IP): Performed by: INTERNAL MEDICINE

## 2021-06-08 PROCEDURE — 93306 TTE W/DOPPLER COMPLETE: CPT

## 2021-06-08 PROCEDURE — 99233 SBSQ HOSP IP/OBS HIGH 50: CPT | Performed by: PSYCHIATRY & NEUROLOGY

## 2021-06-08 PROCEDURE — 36415 COLL VENOUS BLD VENIPUNCTURE: CPT

## 2021-06-08 PROCEDURE — 99233 SBSQ HOSP IP/OBS HIGH 50: CPT | Performed by: NURSE PRACTITIONER

## 2021-06-08 PROCEDURE — 92523 SPEECH SOUND LANG COMPREHEN: CPT

## 2021-06-08 PROCEDURE — 2580000003 HC RX 258: Performed by: PEDIATRICS

## 2021-06-08 RX ORDER — SODIUM CHLORIDE 9 MG/ML
INJECTION, SOLUTION INTRAVENOUS CONTINUOUS
Status: DISCONTINUED | OUTPATIENT
Start: 2021-06-08 | End: 2021-06-08 | Stop reason: HOSPADM

## 2021-06-08 RX ORDER — ACETAMINOPHEN 325 MG/1
650 TABLET ORAL EVERY 4 HOURS PRN
Status: DISCONTINUED | OUTPATIENT
Start: 2021-06-08 | End: 2021-06-08 | Stop reason: HOSPADM

## 2021-06-08 RX ORDER — MECOBALAMIN 5000 MCG
5 TABLET,DISINTEGRATING ORAL NIGHTLY PRN
Status: DISCONTINUED | OUTPATIENT
Start: 2021-06-08 | End: 2021-06-08 | Stop reason: HOSPADM

## 2021-06-08 RX ORDER — ATORVASTATIN CALCIUM 80 MG/1
80 TABLET, FILM COATED ORAL NIGHTLY
Qty: 30 TABLET | Refills: 3 | Status: SHIPPED | OUTPATIENT
Start: 2021-06-08

## 2021-06-08 RX ADMIN — ASPIRIN 81 MG: 81 TABLET, COATED ORAL at 08:24

## 2021-06-08 RX ADMIN — Medication 10 ML: at 08:25

## 2021-06-08 RX ADMIN — VENLAFAXINE HYDROCHLORIDE 75 MG: 37.5 CAPSULE, EXTENDED RELEASE ORAL at 08:23

## 2021-06-08 RX ADMIN — ACETAMINOPHEN 650 MG: 325 TABLET ORAL at 08:23

## 2021-06-08 ASSESSMENT — PAIN SCALES - GENERAL
PAINLEVEL_OUTOF10: 0
PAINLEVEL_OUTOF10: 2
PAINLEVEL_OUTOF10: 0
PAINLEVEL_OUTOF10: 2

## 2021-06-08 ASSESSMENT — PAIN DESCRIPTION - LOCATION: LOCATION: HEAD

## 2021-06-08 ASSESSMENT — PAIN DESCRIPTION - PAIN TYPE: TYPE: ACUTE PAIN

## 2021-06-08 ASSESSMENT — PAIN DESCRIPTION - DESCRIPTORS: DESCRIPTORS: ACHING

## 2021-06-08 NOTE — PROGRESS NOTES
Monitor company [employer]  Length of monitor 30 days  Monitor ordered by Lawrence Santillan CNP  Code: [id number] Monitor ID: [id number]    Nurse will activate at the time of discharge.

## 2021-06-08 NOTE — PROGRESS NOTES
[facility] 82   Daily Progress Note    Admit Date:  6/6/2021  HPI:    Chief Complaint   Patient presents with    Altered Mental Status     ordered a decaf at [facility], son states that he believes she had caffeine instead of decan and is now having memory loss, has had same conversation, has had this reaction to caffeine before        Subjective:  Ms. Carol Davis seen up in room, states her mental clarity is improving. She denies any pain or shortness of breath. Objective:   Patient Vitals for the past 24 hrs:   BP Temp Temp src Pulse Resp SpO2   06/08/21 1115 134/84 98.1 °F (36.7 °C) Oral 62 16 93 %   06/08/21 0909    63     06/08/21 0730 114/74 97.7 °F (36.5 °C) Oral 62 16    06/08/21 0533 113/69 97.5 °F (36.4 °C) Oral 60 14 97 %   06/08/21 0042 126/74 97.8 °F (36.6 °C) Oral 68 15 96 %   06/07/21 2030 108/69 98.5 °F (36.9 °C) Oral 75 16 96 %   06/07/21 1500 107/63 98.3 °F (36.8 °C) Oral 68 16 93 %     No intake or output data in the 24 hours ending 06/08/21 1359  Wt Readings from Last 3 Encounters:   06/06/21 131 lb (59.4 kg)   12/17/18 130 lb 1.1 oz (59 kg)   11/19/18 130 lb 1.1 oz (59 kg)         ASSESSMENT:   1. Elevated troponin's: Unclear etiology, could be related to CVA versus myocardial ischemia  2. Amnesia: new ischemic right temporal stroke, unclear etiology (ischemic versus embolic)  3. PAF: s/p RFCA 2012, remains normal sinus rhythm here, she was symptomatic with the A. fib in the past  4. HTN: Hypertensive initially now normalized        PLAN:  1. Echocardiogram with normal function, no ASD/PFO, no wall motion abnormalities. 2. No further testing at this time. Will arrange for follow-up in the office and pursue ischemia evaluation as outpatient. 3. 30 day event monitor at discharge  4. Continue aspirin and statin  5. Okay for discharge from cardiac perspective. Will review cardiac medications on discharge medication reconciliation form.  Patient has follow up appointment with Dr. Charo Clarke in 1 week. Melody Yancey, APRN - CNP, 6/8/2021, 1:59 PM  [facility]   [phone]       Telemetry: SR 60s  NYHA: III    Physical Exam:  General:  Awake, alert, NAD  Skin:  Warm and dry  Neck:  JVP normal  Chest:  Clear to auscultation   Cardiovascular:  RRR, normal S1S2, no m/r/g  Abdomen:  Soft, nontender, +bowel sounds  Extremities:  No BLE edema        Medications:    sodium chloride flush  5-40 mL Intravenous 2 times per day    aspirin  81 mg Oral Daily    Or    aspirin  300 mg Rectal Daily    atorvastatin  80 mg Oral Nightly    venlafaxine  75 mg Oral Daily      sodium chloride         Lab Data: Lab results independently reviewed and analyzed by myself 6/8/21   CBC:   Recent Labs     06/06/21  1806 06/07/21  0619   WBC 7.2 6.5   HGB 14.1 13.0    217     BMP:    Recent Labs     06/06/21  1806 06/08/21  0634    138   K 4.0 4.3   CO2 24 28   BUN 23* 25*   CREATININE 0.8 0.8     INR:    Recent Labs     06/06/21  1806   INR 1.04     BNP:  No results for input(s): PROBNP in the last 72 hours. Cardiac Enzymes:   Recent Labs     06/07/21  0240 06/07/21  0619 06/07/21  1030   TROPONINI 0.39* 0.36* 0.32*     Lipids:   Lab Results   Component Value Date    TRIG 72 06/07/2021    HDL 67 06/07/2021    LDLCALC 99 06/07/2021       Cardiac Imaging:   ECHO 6/8/21:    Summary   Technically difficult examination. Normal left ventricular systolic function with ejection fraction of 55-60%. No regional wall motion abnormalites are seen. Normal left ventricular size with mild concentric left ventricular   hypertrophy. Grade I diastolic dysfunction with normal filling pressure. Compared to previous study from 12- no changes noted in left   ventricular function. A bubble study was performed and fails to show evidence of shunting. ECHO DEC 2013:     Summary    Normal left ventricle size, wall thickness and systolic function with an estimated ejection fraction of 55%.  No regional wall motion abnormalities are seen. Normal diastolic function.    The mitral valve is normal in structure and function. Trace of mitral regurgitation.     The aortic valve is normal in structure and function. No evidence of aortic valve regurgitation.    The tricuspid valve is normal in structure and function. Trivial tricuspid regurgitation.     The IVC is normal .    no intracardiac mass, vegetations or thrombi.

## 2021-06-08 NOTE — PLAN OF CARE
Problem: Daily Care:  Goal: Daily care needs are met  Description: Daily care needs are met  Outcome: Ongoing     Problem: Pain:  Goal: Patient's pain/discomfort is manageable  Description: Patient's pain/discomfort is manageable  Outcome: Ongoing     Problem: Falls - Risk of:  Goal: Will remain free from falls  Description: Will remain free from falls  Outcome: Ongoing

## 2021-06-08 NOTE — PROGRESS NOTES
Gaurav Winkler  Neurology Follow-up  [facility]    Date of Service: 6/8/2021    Subjective:   CC: Follow up today regarding: Acute amnesia    Events noted. Chart and lab reviewed. The patient denies any new symptoms today. She feels back to baseline. MRI showed very small ischemic stroke in the right hippocampus. Seen by cardiology and plan for cardiac cath and echocardiogram.  She is now on aspirin. Other review of system was unremarkable. ROS : A 10-12 system review obtained and updated today and is unremarkable except as mentioned  in my interval history.      Family history: Noncontributory    Past Medical History:   Diagnosis Date    Anesthesia complication     Low B/P after atrial ablation (SBP 60)    Anxiety     Atrial fibrillation (HCC)     CAD (coronary artery disease) 5/11/12    [facility]    Rotator cuff tendinitis 2/15/2016     Current Facility-Administered Medications   Medication Dose Route Frequency Provider Last Rate Last Admin    melatonin disintegrating tablet 5 mg  5 mg Oral Nightly PRN Tony Weems APRN - CNP        acetaminophen (TYLENOL) tablet 650 mg  650 mg Oral Q4H PRN Iman Dupont MD   650 mg at 06/08/21 8959    perflutren lipid microspheres (DEFINITY) injection 1.65 mg  1.5 mL Intravenous ONCE PRN Justice Nuñezx, DO        sodium chloride flush 0.9 % injection 5-40 mL  5-40 mL Intravenous 2 times per day Jared Hernandez MD   10 mL at 06/08/21 0825    sodium chloride flush 0.9 % injection 5-40 mL  5-40 mL Intravenous PRN Shiva Jha MD        0.9 % sodium chloride infusion  25 mL Intravenous PRN Shiva Jha MD        ondansetron (ZOFRAN-ODT) disintegrating tablet 4 mg  4 mg Oral Q8H PRN Shiva Jha MD        Or    ondansetron (ZOFRAN) injection 4 mg  4 mg Intravenous Q6H PRN Shiva Jha MD        polyethylene glycol (GLYCOLAX) packet 17 g  17 g Oral Daily PRN Jared Hernandez MD        aspirin EC tablet 81 mg  81 mg Oral Daily Suzette Ponce MD   81 mg at 06/08/21 7317    Or    aspirin suppository 300 mg  300 mg Rectal Daily Shiva Cox MD        atorvastatin (LIPITOR) tablet 80 mg  80 mg Oral Nightly Shiva Cox MD   80 mg at 06/07/21 2152    labetalol (NORMODYNE;TRANDATE) injection 10 mg  10 mg Intravenous Q10 Min PRN Shiva Cox MD        perflutren lipid microspheres (DEFINITY) injection 1.65 mg  1.5 mL Intravenous ONCE PRN Shiva Cox MD        tiZANidine (ZANAFLEX) tablet 4 mg  4 mg Oral BID PRN Suzette Ponce MD        venlafaxine (EFFEXOR XR) extended release capsule 75 mg  75 mg Oral Daily Suzette Ponce MD   75 mg at 06/08/21 7373    calcium elemental (OSCAL) tablet 500 mg  500 mg Oral BID PRN Suzette Ponce MD        traZODone (DESYREL) tablet 50 mg  50 mg Oral Nightly PRN Claudette Patton, APRN - CNP   50 mg at 06/07/21 0012     Allergies   Allergen Reactions    Cephalexin Hives and Swelling    Pcn [Penicillins] Hives and Swelling      reports that she quit smoking about 42 years ago. She has never used smokeless tobacco. She reports current alcohol use of about 5.0 standard drinks of alcohol per week. She reports that she does not use drugs. Objective:  Exam:   Constitutional:   Vitals:    06/08/21 0533 06/08/21 0730 06/08/21 0909 06/08/21 1115   BP: 113/69 114/74  134/84   Pulse: 60 62 63 62   Resp: 14 16  16   Temp: 97.5 °F (36.4 °C) 97.7 °F (36.5 °C)  98.1 °F (36.7 °C)   TempSrc: Oral Oral  Oral   SpO2: 97%   93%   Weight:       Height:         General appearance:  Normal development and appear in no acute distress. Mental Status:   Oriented to person, place, problem, and time. Memory: Good immediate recall. Intact remote memory  Normal attention span and concentration. Language: intact naming, repeating and fluency   Good fund of Knowledge. Cranial Nerves:   II: Visual fields: Full.    Pupils: equal, round, reactive to light  III,IV,VI: Extra Ocular Movements are intact. No nystagmus  V: Facial sensation is intact  VII: Facial strength and movements: intact and symmetric  XII: Tongue movements are normal  Musculoskeletal: 5/5 in all 4 extremities. Coordination: no pronator drift, no dysmetria with FNF  Sensation: normal to all modalities in both arms and legs. Gait/Posture: steady gait        Data:  LABS:   Lab Results   Component Value Date     06/08/2021    K 4.3 06/08/2021     06/08/2021    CO2 28 06/08/2021    BUN 25 06/08/2021    CREATININE 0.8 06/08/2021    GFRAA >60 06/08/2021    GFRAA >60 03/13/2012    LABGLOM >60 06/08/2021    GLUCOSE 91 06/08/2021    MG 1.90 06/06/2021    CALCIUM 9.2 06/08/2021     Lab Results   Component Value Date    WBC 6.5 06/07/2021    RBC 4.29 06/07/2021    HGB 13.0 06/07/2021    HCT 38.5 06/07/2021    MCV 89.7 06/07/2021    RDW 13.0 06/07/2021     06/07/2021     Lab Results   Component Value Date    INR 1.04 06/06/2021    PROTIME 12.1 06/06/2021       Neuroimaging was independently reviewed by me and discussed results with the patient  I reviewed blood testing and other test results and discussed results with the patient      Impression:  Acute amnesia. Improved. Could be related to new ischemic right temporal stroke. Thromboembolic versus cardioembolic. History of PAF, status post ablation  Hyperlipidemia  CAD      Recommendation  Aspirin for now.     Echo   Agree with the 4 weeks event monitor to rule out paroxysmal A. fib  DVT and GI prophylaxis  Statin  Continue blood pressure monitor  SSRI  Continue with cardiology recommendation and further cardiological work-up and even possible cath  Stroke education and secondary prevention was discussed in detail with the patient and her son today  Continue PT and OT  Blood pressure monitor at home   DC planning when medically stable  We will follow from periphery  Please call for questions    MDM: High due to risk of strokes and risk of recurrence. Rajni Mosqueda MD   [phone]      This dictation was generated by voice recognition computer software. Although all attempts are made to edit the dictation for accuracy, there may be errors in the transcription that are not intended.

## 2021-06-08 NOTE — PROGRESS NOTES
Hospitalist Progress Note      PCP: Rj Gallardo    Date of Admission: 6/6/2021    Chief Complaint: Confusion, memory loss    Hospital Course: H&P reviewed. Subjective:     MRI brain  + acute infarct. Patient denies any chest pain, focal weakness, tingling , numbness or any other symptoms currently. Son at bedside      Medications:  Reviewed    Infusion Medications    sodium chloride       Scheduled Medications    sodium chloride flush  5-40 mL Intravenous 2 times per day    aspirin  81 mg Oral Daily    Or    aspirin  300 mg Rectal Daily    atorvastatin  80 mg Oral Nightly    venlafaxine  75 mg Oral Daily     PRN Meds: melatonin, acetaminophen, perflutren lipid microspheres, sodium chloride flush, sodium chloride, ondansetron **OR** ondansetron, polyethylene glycol, labetalol, perflutren lipid microspheres, tiZANidine, calcium elemental, traZODone    No intake or output data in the 24 hours ending 06/08/21 1117    Physical Exam Performed:    /74   Pulse 63   Temp 97.7 °F (36.5 °C) (Oral)   Resp 16   Ht 5' 5\" (1.651 m)   Wt 131 lb (59.4 kg)   SpO2 97%   BMI 21.80 kg/m²     General appearance: No apparent distress, appears stated age and cooperative. HEENT: Pupils equal, round,  Conjunctivae/corneas clear. Neck: Supple, with full range of motion. No jugular venous distention. Trachea midline. Respiratory:  Normal respiratory effort. Clear to auscultation, bilaterally without Rales/Wheezes/Rhonchi. Cardiovascular: Regular rate and rhythm with normal S1/S2 without murmurs, rubs or gallops. Abdomen: Soft, non-tender, non-distended with normal bowel sounds. Musculoskeletal: No clubbing, cyanosis or edema bilaterally. Skin: Warm and dry   neurologic:  Neurovascularly intact without any focal sensory/motor deficits.  Cranial nerves: II-XII intact, grossly non-focal.  Psychiatric: Alert and oriented, thought content appropriate, normal insight         Labs:   Recent Labs 06/06/21  1806 06/07/21  0619   WBC 7.2 6.5   HGB 14.1 13.0   HCT 42.0 38.5    217     Recent Labs     06/06/21  1806 06/08/21  0634    138   K 4.0 4.3   CL 99 102   CO2 24 28   BUN 23* 25*   CREATININE 0.8 0.8   CALCIUM 9.8 9.2     Recent Labs     06/06/21  1806   AST 28   ALT 21   BILITOT <0.2   ALKPHOS 78     Recent Labs     06/06/21  1806   INR 1.04     Recent Labs     06/07/21  0240 06/07/21  0619 06/07/21  1030   TROPONINI 0.39* 0.36* 0.32*       Urinalysis:      Lab Results   Component Value Date    NITRU Negative 06/06/2021    BLOODU Negative 06/06/2021    SPECGRAV 1.010 06/06/2021    GLUCOSEU Negative 06/06/2021       Radiology:  MRI brain without contrast   Final Result   Punctate acute infarct in the right hippocampus. No significant mass effect   or midline shift. Mild chronic small vessel ischemic disease and mild atrophy. CTA CHEST W CONTRAST   Final Result   No evidence of pulmonary embolism or acute pulmonary abnormality. No   mediastinal hematoma, adenopathy, or other significant abnormality. Slight   dependent atelectasis. XR CHEST PORTABLE   Final Result   Wide mediastinum compared to prior exam.  This appearance may be due in part   to differences in technique. CTA chest recommended for better evaluation and   to exclude acute mediastinal process. CTA HEAD NECK W CONTRAST   Final Result   No acute arterial abnormality or hemodynamically significant arterial   stenosis in the head or neck. CT HEAD WO CONTRAST   Final Result   No acute intracranial abnormality. Critical results were called by Dr. Otto Botello to [facility] on   6/6/2021 at 18:35. 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

## 2021-06-08 NOTE — PROGRESS NOTES
Speech Language Pathology  Facility/Department: [facility] B3 - MED SURG  Initial Speech/Language/Cognitive Assessment    NAME: Mae Mcfadden  : 1945   MRN: [id number]  ADMISSION DATE: 2021  ADMITTING DIAGNOSIS: has Atrial fibrillation ([facility] [address] Atrial flutter ([address] Rotator cuff tendinitis; Complete tear of right rotator cuff; TGA (transient global amnesia); TIA involving basilar artery; HTN (hypertension), benign; NSTEMI (non-ST elevated myocardial infarction) ([facility] [address] Elevated blood pressure reading; and Cerebrovascular accident (CVA) ([facility] [address] on their problem list.  DATE ONSET: 2021    Date of Eval: 2021   Evaluating Therapist: CECILLE Osullivan    RECENT RESULTS  CT OF HEAD/MRI: 2021  Impression   Punctate acute infarct in the right hippocampus.  No significant mass effect   or midline shift.       Mild chronic small vessel ischemic disease and mild atrophy. Primary Complaint:  Per MD H&P: \"68 y.o. female who presented to [facility] with her son with report that she was feeling confused today and has had memory problems around 1730. She ordered decaf coffee but her son suspects that she was given caffeinated coffee instead. Today she attended a [employer] and a lunch but subsequently didn't recall either. Has also had trouble recalling conversations that she had just had. No headache, no fever, no neck stiffness. Denies sensation of focal weakness, numbness, or paresthesia. No trouble with speech / word formation. She admits to feeling confused. She guessed the present month to be \"close to vacation, - maybe . \" She correctly reports the year as . She knows her home address. She is aware that she is at [employer]. She does have hypertension. No DM. Has a history of issues with pseudohypoglycemia (no measured lows, but she doesn't feel right and attributes it to needing to eat). No chest pain or pressure. No c/o dyspnea.   She presents with ST depressions on her EKG and a abnormal troponin to 0. 15.    She has a history of atrial fibrillation and previously was treated with an ablation. \"    Pt with overall complaints of decreased recall, but has improved since admission. Pain:  Pain Assessment  Pain Assessment: 0-10  Pain Level: 0  Patient's Stated Pain Goal: No pain  Pain Type: Acute pain  Pain Location: Head  Pain Descriptors: Aching  Non-Pharmaceutical Pain Intervention(s): Rest  Response to Pain Intervention: Patient Satisfied    Assessment:  Cognitive Diagnosis: Mild cognitive deficits characterized by impaired memory     Diagnosis: Pt is a 77year old female admitted to [facility] on 06/06/2021 due to feeling confused and memory loss. PMHx includes anxiety, A-Fib, and CAD. Pt's MRI did indicate an infarct in right hippocampus. RN ok'd entry of SLP. Pt alert and cooperative, agreeable to cognitive evaluation. Pt reports she feels her memory has improved since initial admission and even since this AM. Pt denies other cognitive changes despite difficulty with memory. Pt reports she lives alone in a house. Pt is independent with finances, med management, and all household tasks (pt does have a cleaning lady). Pt is an active . SLP administered the MoCA for assessment of cognitive-linguistic function. Pt scored a 27/30 on the MoCA (criterion [facility] >26), which is judged to be [facility] when considering pt's prior level of function and high level of independence. Pt's area of deficit did include recall. Areas of strength include orientation, naming, language, attention, problem solving, executive function. Results as follows:     Chris Cognitive Assessment (MoCA)    Section Subtest Score Comments   Visuospatial/ Executive [place] making 1/1     Copy Cube 1/1     Clock 3/3    Naming Picture naming 3/3    Attention Number repetition 2/2     Selective attention 1/1     Serial 7s 3/3    Language Repetition 1/2 Errors related to recalling sentence more than language    Fluency 1/1    Abstraction Comparisons 2/2    Delayed Recall Recall 5 novel words 3/5 +1 given category cue  +1 given MC cue   Orientation Spatial and Temporal 6/6     Total:       Overall, pt's MoCA score does indicate cognitive skills WFL. Pt would benefit from further ST services to target recall, though. Pt is likely to d/c home today. Pt's 2 sons and brother in law present. Extensive education to pt and her family re: talking to her PCP about an OP speech referral for further and more in depth cognitive evaluation if memory does continue to improve and return to baseline. SLP also recommend assistance with finances and med management until memory has improved. Pt does report use of a pill box. Pt is agreeable to receive assistance from family as needed for finances and med management. Pt's daughter lives in [place], but is a speech therapist and pt reported daughter will also continue to help her. SLP edu pt re: recall strategies - writing info down, setting alarms, using timers. Pt verbalized understanding. Pt may benefit from continued ST services in the OP setting if memory does not improve. Pt and pt's family in agreement to discuss with PCP a speech referral if needed. Recommendations:  Requires SLP Intervention: Yes  Duration/Frequency of Treatment: 2-4x/week  D/C Recommendations: Home with intermittent assistance; Outpatient   *OP speech therapy is recommended if recall does not return to baseline       Plan:   Goals:  Short-term Goals  Timeframe for Short-term Goals: 4 days (06/12/2021)  Goal 1: Pt will recall 5 new pieces of information with 90% acc given min cues  Goal 2: Pt will complete graded recall tasks with 90% acc given min cues  Goal 3: Pt will demonstrate understanding of recall strategies in order to improve overall memory given min cues  Long-term Goals  Timeframe for Long-term Goals: 5 days (06/13/2021)  Goal 1: Pt will improve overall cognitive-linguistic abilities in order to safely return home to [facility]   Patient/family involved in developing goals and treatment plan: Yes    Subjective:   Previous level of function and limitations: Independent. Lives alone. Manages all finances, medications, household tasks. Active . General  Chart Reviewed: Yes  Patient assessed for rehabilitation services?: Yes  Additional Pertinent Hx: anxiety, A-Fib, CAD  Family / Caregiver Present: Yes  General Comment  Comments: Chart reviewed for completion of cognitive assessment  Subjective  Subjective: RN ok'd entry of SLP. Pt alert and cooperative, agreeable to eval. Pt upright in bed. Social/Functional History  Lives With: Alone  Type of Home: House  ADL Assistance: Independent  Homemaking Assistance: Independent  Homemaking Responsibilities: Yes  Ambulation Assistance: Independent  Active : Yes  Leisure & Hobbies: Read, scrapbook  Vision  Vision: Impaired  Vision Exceptions: Wears glasses for reading  Hearing  Hearing: Within functional limits           Objective:     Oral/Motor  Oral Motor: Within functional limits    Auditory Comprehension  Comprehension: Within Functional Limits     Expression  Primary Mode of Expression: Verbal    Verbal Expression  Verbal Expression: Within functional limits    Motor Speech  Motor Speech:  Within Functional Limits    Pragmatics/Social Functioning  Pragmatics: Within functional limits    Cognition:      Orientation  Overall Orientation Status: Within Normal Limits  Attention  Attention: Within Functional Limits  Memory  Memory: Exceptions to [facility]  People Encountered: Mild  Short-term Memory: Mild  Working Memory: Mild  Problem Solving  Problem Solving: Within Functional Limits  Numeric Reasoning  Numeric Reasoning: Within Functional Limits  Abstract Reasoning  Abstract Reasoning: Within Functional Limits  Safety/Judgement  Safety/Judgement: Within Functional Limits    Prognosis:  Speech Therapy Prognosis  Prognosis: Good  Individuals consulted  Consulted and agree with results and recommendations: Patient; Family member;RN  Family member consulted: pt's 2 sons, pt's brother in law    Education:  Patient Education: SLP edu pt re: role of SLP, rationale of cognitive evaluation, MoCA results, recall strategies, recommendations, POC  Patient Education Response: Verbalizes understanding  Safety Devices in place: Yes  Type of devices: Nurse notified;Call light within reach; Left in bed    Therapy Time:   Individual   Time In 1530   Time Out 1609   Minutes [address] [facility] [address] Bennett Rivera  Speech Language Pathologist

## 2021-06-08 NOTE — PLAN OF CARE
Problem: Neurological  Intervention: Speech Evaluation/treatment  Note: SLP completed evaluation. Please refer to notes in EMR.        David Morales MA [address] [facility]  Speech Language Pathologist

## 2021-06-08 NOTE — TELEPHONE ENCOUNTER
Monitor company [employer]  Length of monitor 30 days  Monitor ordered by Demond Nolasco CNP  Code: [id number] Monitor ID: [id number]    Nurse will activate at the time of discharge.

## 2021-06-11 NOTE — DISCHARGE INSTR - COC
Continuity of Care Form    Patient Name: Keagan Andrade   :  1945  MRN:  [id number]    Admit date:  2021  Discharge date:  ***    Code Status Order: Prior   Advance Directives:   [facility] Documentation       Date/Time Healthcare Directive Type of Healthcare Directive Copy in [address] [place] [address] [address] Agent's Name Healthcare Agent's Phone Number    21 [phone]  No, patient does not have an advance directive for healthcare treatment -- -- -- -- --            Admitting Physician:  Aye Romero MD  PCP: Rena Arana    Discharging Nurse: [facility] Unit/Room#: 6126/8567-43  Discharging Unit Phone Number: ***    Emergency Contact:   Extended Emergency Contact Information  Primary Emergency Contact: Jose Bates [address] Phone: [phone]  Relation: Brother/Sister  Secondary Emergency Contact: Gurpreet Gibson  Phone: [phone]  Relation: Other    Past Surgical History:  Past Surgical History:   Procedure Laterality Date    ATRIAL ABLATION SURGERY  3/2012       Immunization History:   Immunization History   Administered Date(s) Administered    COVID-19, Moderna, PF, 100mcg/0.5mL 2021, 2021    Pneumococcal Polysaccharide ([id number]) 2012       Active Problems:  Patient Active Problem List   Diagnosis Code    Atrial fibrillation ([facility] [address] I48.91    Atrial flutter (H[facility]) I48.92    Rotator cuff tendinitis M75.80    Complete tear of right rotator cuff M75.121    TGA (transient global amnesia) G45.4    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    .      Infection Onset Last Indicated Last Received Source    No mapped external infections found      . Unmapped Infections        C. diff 09/25/19 09/25/19 06/06/21 [facility]                  Patient Infection Status       None to display            Nurse Assessment:  Last Vital Signs: /67   Pulse 73   Temp 98.4 °F (36.9 °C) (Oral)   Resp 16   Ht 5' 5\" (1.651 m)   Wt 131 lb (59.4 kg)   SpO2 94%   BMI 21.80 kg/m²     Last documented pain score (0-10 scale): Pain Level: 0  Last Weight:   Wt Readings from Last 1 Encounters:   06/06/21 131 lb (59.4 kg)     Mental Status:  {IP PT MENTAL STATUS:20030:::0}    IV Access:  { ELIEZER IV ACCESS:236230572:::0}    Nursing Mobility/ADLs:  Walking   {P DME ADLs:116901684:::0}  Transfer  {CHP DME ADLs:756195253:::0}  Bathing  {CHP DME ADLs:457718574:::0}  Dressing  {CHP DME ADLs:141413308:::0}  Toileting  {CHP DME ADLs:496836297:::0}  Feeding  {CHP DME ADLs:513120025:::0}  Med Admin  {P DME ADLs:662138573:::0}  Med Delivery   { ELIEZER MED Delivery:769784661:::0}    Wound Care Documentation and Therapy:  Incision 05/11/12 Mouth (Active)   Number of days: 6854        Elimination:  Continence: Bowel: {YES / NQ:40641}  Bladder: {YES / NV:52808}  Urinary Catheter: {Urinary Catheter:327886211:::0}   Colostomy/Ileostomy/Ileal Conduit: {YES / NU:93113}       Date of Last BM: ***  No intake or output data in the 24 hours ending 06/11/21 0056  No intake/output data recorded.     Safety Concerns:     508 N4MD Safety Concerns:666274587:::0}    Impairments/Disabilities:      508 N4MD Impairments/Disabilities:747443666:::0}    Nutrition Therapy:  Current Nutrition Therapy:   508 N4MD Diet List:475040163:::0}    Routes of Feeding: {CHP DME Other Feedings:928404800:::0}  Liquids: {Slp liquid thickness:26647}  Daily Fluid Restriction: {CHP DME Yes amt example:444563819:::0}  Last Modified Barium Swallow with Video (Video Swallowing Test): {Done Not Done UZNY:548962252:::8}    Treatments at the Time of Hospital Discharge:   Respiratory Treatments: ***  Oxygen Therapy:  {Therapy; copd oxygen:58425:::0}  Ventilator:    {[facility] Vent List:639107091:::0}    Rehab Therapies: {THERAPEUTIC INTERVENTION:1546045177}  Weight Bearing Status/Restrictions: { CC Weight Bearin:::0}  Other Medical Equipment (for information only, NOT a DME order):  {EQUIPMENT:206958071}  Other Treatments: ***    Patient's personal belongings (please select all that are sent with patient):  {CHP DME Belongings:815136209:::0}    RN SIGNATURE:  {Esignature:294752586:::0}    CASE MANAGEMENT/SOCIAL WORK SECTION    Inpatient Status Date: ***    Readmission Risk Assessment Score:  Readmission Risk              Risk of Unplanned Readmission:  0           Discharging to Facility/ Agency   Name:   Address:  Phone:  Fax:    Dialysis Facility (if applicable)   Name:  Address:  Dialysis Schedule:  Phone:  Fax:    / signature: {Esignature:856538536:::0}    PHYSICIAN SECTION    Prognosis: {Prognosis:4414954732:::0}    Condition at Discharge: [address] [place] [address] Patient Condition:799585932:::0}    Rehab Potential (if transferring to Rehab): {Prognosis:0839758069:::0}    Recommended Labs or Other Treatments After Discharge: ***    Physician Certification: I certify the above information and transfer of Autumn Fox  is necessary for the continuing treatment of the diagnosis listed and that she requires {Admit to Appropriate Level of Care:77273:::0} for {GREATER/LESS:832806375} 30 days.      Update Admission H&P: {CHP DME Changes in HandP:376317651:::0}    PHYSICIAN SIGNATURE:  {Esignature:836811016:::0}

## 2021-06-11 NOTE — DISCHARGE SUMMARY
Hospital Medicine Discharge Summary    Patient ID: Lito Lester      Patient's PCP: Delfin Pro    Admit Date: 6/6/2021     Discharge Date: 6/8/2021      Admitting Physician: Diana Pepe MD     Discharge Physician: Tigist Ward MD     Discharge Diagnoses: Active Hospital Problems    Diagnosis     Cerebrovascular accident (CVA) ([facility] [address] [I63.9]      Priority: High    Elevated troponin           Elevated blood pressure reading [R03.0]     TIA involving basilar artery [G45.0]     HTN (hypertension), benign [I10]     TGA (transient global amnesia) [G45.4]        The patient was seen and examined on day of discharge and this discharge summary is in conjunction with any daily progress note from day of discharge. HPI on 6/6/2021:   77 y.o. female who presented to [facility] with her son with report that she was feeling confused today and has had memory problems around 1730. She ordered decaf coffee but her son suspects that she was given caffeinated coffee instead.      Today she attended a [employer] and a lunch but subsequently didn't recall either. Has also had trouble recalling conversations that she had just had. No headache, no fever, no neck stiffness. Denies sensation of focal weakness, numbness, or paresthesia. No trouble with speech / word formation.      She admits to feeling confused. She guessed the present month to be \"close to vacation, - maybe June. \" She correctly reports the year as 2021. She knows her home address. She is aware that she is at [employer].      She does have hypertension. No DM. Has a history of issues with pseudohypoglycemia (no measured lows, but she doesn't feel right and attributes it to needing to eat).     No chest pain or pressure. No c/o dyspnea. She presents with ST depressions on her EKG and a abnormal troponin to 0. 15.      She has a history of atrial fibrillation and previously was treated with an ablation.    No daily aspirin, non-tender, non-distended with normal bowel sounds. Musculoskeletal:  No clubbing, cyanosis or edema bilaterally. Skin: Skin color, texture, turgor normal.  No rashes or lesions. Neurologic:  Neurovascularly intact without any focal sensory/motor deficits. Cranial nerves: II-XII intact, grossly non-focal.  Psychiatric:  Alert and orientedX3   Capillary Refill: Brisk,< 3 seconds   Peripheral Pulses: +2 palpable, equal bilaterally       Labs: For convenience and continuity at follow-up the following most recent labs are provided:      CBC:    Lab Results   Component Value Date    WBC 6.5 06/07/2021    HGB 13.0 06/07/2021    HCT 38.5 06/07/2021     06/07/2021       Renal:    Lab Results   Component Value Date     06/08/2021    K 4.3 06/08/2021     06/08/2021    CO2 28 06/08/2021    BUN 25 06/08/2021    CREATININE 0.8 06/08/2021    CALCIUM 9.2 06/08/2021         Significant Diagnostic Studies    Radiology:   MRI brain without contrast   Final Result   Punctate acute infarct in the right hippocampus. No significant mass effect   or midline shift. Mild chronic small vessel ischemic disease and mild atrophy. CTA CHEST W CONTRAST   Final Result   No evidence of pulmonary embolism or acute pulmonary abnormality. No   mediastinal hematoma, adenopathy, or other significant abnormality. Slight   dependent atelectasis. XR CHEST PORTABLE   Final Result   Wide mediastinum compared to prior exam.  This appearance may be due in part   to differences in technique. CTA chest recommended for better evaluation and   to exclude acute mediastinal process. CTA HEAD NECK W CONTRAST   Final Result   No acute arterial abnormality or hemodynamically significant arterial   stenosis in the head or neck. CT HEAD WO CONTRAST   Final Result   No acute intracranial abnormality. Critical results were called by Dr. Meche Jackson to Skip Moore on   6/6/2021 at 18:35. Cardiac echo :    Technically difficult examination. Normal left ventricular systolic function with ejection fraction of 55-60%. No regional wall motion abnormalites are seen. Normal left ventricular size with mild concentric left ventricular   hypertrophy. Grade I diastolic dysfunction with normal filling pressure. Compared to previous study from 12- no changes noted in left   ventricular function. A bubble study was performed and fails to show evidence of shunting. Consults:     IP CONSULT TO STROKE TEAM  IP CONSULT TO HOSPITALIST  IP CONSULT TO CARDIOLOGY  IP CONSULT TO CARDIOLOGY  IP CONSULT TO NEUROLOGY    Disposition:  Home       Condition at Discharge: Stable    Discharge Instructions/Follow-up:  Follow up with cardiology  In one week per d/c instructions       Code Status:  Prior       Activity: activity as tolerated    Diet: cardiac diet      Discharge Medications:     Discharge Medication List as of 6/8/2021  3:54 PM           Details   atorvastatin (LIPITOR) 80 MG tablet Take 1 tablet by mouth nightly, Disp-30 tablet, R-3Normal              Details   ibuprofen (ADVIL;MOTRIN) 200 MG tablet Take 200 mg by mouth every 6 hours as needed for PainHistorical Med      calcium carbonate 600 MG TABS tablet Take 1 tablet by mouth 2 times daily as neededHistorical Med      venlafaxine (EFFEXOR-XR) 75 MG XR capsule Take 75 mg by mouth daily. tiZANidine (ZANAFLEX) 4 MG tablet Take 1 tablet by mouth 2 times daily as needed (muscle spasms), Disp-40 tablet, R-0Normal      diclofenac (VOLTAREN) 75 MG EC tablet Take 1 tablet by mouth 2 times daily, Disp-42 tablet, R-0      aspirin 81 MG tablet Take 81 mg by mouth daily. Time Spent on discharge is more than 30 minutes in the examination, evaluation, counseling and review of medications and discharge plan.       Signed:    Warden Alvina MD   6/11/2021      Thank you Lorene Ramirez for the opportunity to be involved in this

## 2021-06-15 ENCOUNTER — OFFICE VISIT (OUTPATIENT)
Dept: CARDIOLOGY CLINIC | Age: 76
End: 2021-06-15
Payer: MEDICARE

## 2021-06-15 VITALS
BODY MASS INDEX: 21.66 KG/M2 | HEIGHT: 65 IN | WEIGHT: 130 LBS | OXYGEN SATURATION: 98 % | DIASTOLIC BLOOD PRESSURE: 72 MMHG | SYSTOLIC BLOOD PRESSURE: 120 MMHG | HEART RATE: 65 BPM

## 2021-06-15 DIAGNOSIS — I10 HTN (HYPERTENSION), BENIGN: ICD-10-CM

## 2021-06-15 DIAGNOSIS — I48.0 PAROXYSMAL ATRIAL FIBRILLATION (HCC): ICD-10-CM

## 2021-06-15 DIAGNOSIS — Z87.891 FORMER TOBACCO USE: ICD-10-CM

## 2021-06-15 DIAGNOSIS — R77.8 ELEVATED TROPONIN: Primary | ICD-10-CM

## 2021-06-15 DIAGNOSIS — I63.9 CEREBROVASCULAR ACCIDENT (CVA), UNSPECIFIED MECHANISM (HCC): ICD-10-CM

## 2021-06-15 PROCEDURE — 1111F DSCHRG MED/CURRENT MED MERGE: CPT | Performed by: INTERNAL MEDICINE

## 2021-06-15 PROCEDURE — 1090F PRES/ABSN URINE INCON ASSESS: CPT | Performed by: INTERNAL MEDICINE

## 2021-06-15 PROCEDURE — G8420 CALC BMI NORM PARAMETERS: HCPCS | Performed by: INTERNAL MEDICINE

## 2021-06-15 PROCEDURE — G8399 PT W/DXA RESULTS DOCUMENT: HCPCS | Performed by: INTERNAL MEDICINE

## 2021-06-15 PROCEDURE — 4040F PNEUMOC VAC/ADMIN/RCVD: CPT | Performed by: INTERNAL MEDICINE

## 2021-06-15 PROCEDURE — G8427 DOCREV CUR MEDS BY ELIG CLIN: HCPCS | Performed by: INTERNAL MEDICINE

## 2021-06-15 PROCEDURE — 1036F TOBACCO NON-USER: CPT | Performed by: INTERNAL MEDICINE

## 2021-06-15 PROCEDURE — 99214 OFFICE O/P EST MOD 30 MIN: CPT | Performed by: INTERNAL MEDICINE

## 2021-06-15 PROCEDURE — 1123F ACP DISCUSS/DSCN MKR DOCD: CPT | Performed by: INTERNAL MEDICINE

## 2021-06-15 PROCEDURE — 3017F COLORECTAL CA SCREEN DOC REV: CPT | Performed by: INTERNAL MEDICINE

## 2021-06-15 RX ORDER — TRAZODONE HYDROCHLORIDE 50 MG/1
50 TABLET ORAL NIGHTLY
COMMUNITY

## 2021-06-15 RX ORDER — IRBESARTAN 150 MG/1
75 TABLET ORAL NIGHTLY
COMMUNITY

## 2021-06-15 NOTE — PROGRESS NOTES
[facility]   Follow Up  [phone]      Attending Physician: No att. providers found  Reason for Consultation/Chief Complaint:   Follow-up from recent admission for stroke     Subjective     Polly Diaz is a 77 y.o. female with a history of paroxysmal atrial fibrillation s/p cryoablation on 3/9/12 (no longer on anticoagluation), PVCs, anxiety, and previous tobacco use who presents for follow-up after recently being admitted with an acute CVA. Brain MRI during her admission showed a punctate acute infarct in the right hippocampus. Neck CTA of the head/neck did not demonstrate any hemodynamically significant stenoses. No atrial fibrillation was observed on telemetry during her admission and she was discharged with a 30-day cardiac event monitor which she is wearing today. Her troponins were elevated at the time of her stroke (peak troponin T 0.39) and TTE with bubble study from 6/8/21 showed an LVEF of 55-60% with normal wall motion, mild concentric LVH, grade 1 diastolic dysfunction, normal RV size and systolic function, and no hemodynamically significant valvular abnormalities. No atrial level shunting was observed. She did not have any angina during admission and her troponin elevation was felt to be secondary to her CVA. Outpatient stress testing was planned for further cardiac risk stratification. Today, the patient presents with her sister. She still has some confusion and does not remember much about her admission or the events leading up to it. Aside from her memory issues, which she believes are steadily improving, she has generally been feeling well. She does note some occasional mild lightheadedness when standing too quickly, but has not had any falls or syncope. She denies chest pain, shortness of breath, palpitations, lower extremity edema, orthopnea, or paroxysmal nocturnal dyspnea.        Past Medical History:   has a past medical history of Anesthesia complication, Anxiety, Atrial fibrillation ([address] CAD (coronary artery disease), and Rotator cuff tendinitis. Surgical History:   has a past surgical history that includes Atrial ablation surgery (3/2012). Social History:   reports that she quit smoking about 42 years ago. She has never used smokeless tobacco. She reports current alcohol use of about 5.0 standard drinks of alcohol per week. She reports that she does not use drugs. Family History:  No family history of early onset CAD or sudden cardiac death. Home Medications:  Were reviewed and are listed in nursing record and/or below  Prior to Admission medications    Medication Sig Start Date End Date Taking? Authorizing Provider   Probiotic Product (PROBIOTIC DAILY PO) Take by mouth   Yes Historical Provider, MD   Omeprazole (PRILOSEC PO) Take by mouth   Yes Historical Provider, MD   irbesartan (AVAPRO) 150 MG tablet Take 150 mg by mouth nightly   Yes Historical Provider, MD   Cetirizine HCl (ZYRTEC PO) Take by mouth   Yes Historical Provider, MD   traZODone (DESYREL) 50 MG tablet Take 50 mg by mouth nightly   Yes Historical Provider, MD   atorvastatin (LIPITOR) 80 MG tablet Take 1 tablet by mouth nightly 6/8/21  Yes Trace Dupont MD   calcium carbonate 600 MG TABS tablet Take 1 tablet by mouth 2 times daily as needed   Yes Historical Provider, MD   aspirin 81 MG tablet Take 81 mg by mouth daily. Yes Historical Provider, MD   venlafaxine (EFFEXOR-XR) 75 MG XR capsule Take 75 mg by mouth daily.      Yes Historical Provider, MD   tiZANidine (ZANAFLEX) 4 MG tablet Take 1 tablet by mouth 2 times daily as needed (muscle spasms) 11/12/18   BERNA Odonnell   ibuprofen (ADVIL;MOTRIN) 200 MG tablet Take 200 mg by mouth every 6 hours as needed for Pain    Historical Provider, MD   diclofenac (VOLTAREN) 75 MG EC tablet Take 1 tablet by mouth 2 times daily 1/4/16   Cornelius Elizondo MD        CURRENT Medications:  No current facility-administered medications for this visit. Allergies:  Cephalexin and Pcn [penicillins]     Review of Systems:   A 14 point review of symptoms completed. Pertinent positives identified in the HPI, all other review of symptoms negative as below. Review of Systems   Constitutional: Negative for chills and fever. HENT: Negative for congestion, rhinorrhea and sore throat. Eyes: Negative for photophobia, pain and visual disturbance. Respiratory: Negative for cough and shortness of breath. Cardiovascular: Negative for chest pain and leg swelling. Gastrointestinal: Negative for abdominal pain, blood in stool, constipation, diarrhea, nausea and vomiting. Endocrine: Negative for cold intolerance and heat intolerance. Genitourinary: Negative for difficulty urinating, dysuria and hematuria. Musculoskeletal: Negative for arthralgias, joint swelling and myalgias. Skin: Negative for rash and wound. Allergic/Immunologic: Negative for food allergies. Neurological: Positive for dizziness. Negative for syncope, weakness and light-headedness. Hematological: Negative for adenopathy. Does not bruise/bleed easily. Psychiatric/Behavioral: Positive for confusion. Negative for dysphoric mood. The patient is not nervous/anxious. Objective   PHYSICAL EXAM:    Vitals:    06/15/21 1550   BP: 120/72   Pulse: 65   SpO2: 98%    Weight: 130 lb (59 kg)       General: Adult female in no acute distress. Pleasant and interactive on exam.  HEENT: Normocephalic, atraumatic, non-icteric, hearing intact, nares normal, mucous membranes moist.  Neck: No JVD. Heart: Regular rate and rhythm. Normal S1 and S2. No murmurs, gallops or rubs. Lungs: Normal respiratory effort. Clear to auscultation bilaterally. No wheezes, rales, or rhonchi. Abdomen: Soft, non-tender. Normoactive bowel sounds. No masses or organomegaly. Skin: No rashes, wounds, or lesions. Pulses: 2+ and symmetric. Extremities: No clubbing, cyanosis, or edema.   Neuro: Alert and oriented. Labs   CBC:   Lab Results   Component Value Date    WBC 6.5 06/07/2021    RBC 4.29 06/07/2021    HGB 13.0 06/07/2021    HCT 38.5 06/07/2021    MCV 89.7 06/07/2021    RDW 13.0 06/07/2021     06/07/2021     CMP:  Lab Results   Component Value Date     06/08/2021    K 4.3 06/08/2021     06/08/2021    CO2 28 06/08/2021    BUN 25 06/08/2021    CREATININE 0.8 06/08/2021    GFRAA >60 06/08/2021    GFRAA >60 03/13/2012    AGRATIO 1.5 06/06/2021    LABGLOM >60 06/08/2021    GLUCOSE 91 06/08/2021    PROT 7.6 06/06/2021    PROT 5.8 03/11/2012    CALCIUM 9.2 06/08/2021    BILITOT <0.2 06/06/2021    ALKPHOS 78 06/06/2021    AST 28 06/06/2021    ALT 21 06/06/2021     PT/INR:  No results found for: PTINR  HgBA1c:  Lab Results   Component Value Date    LABA1C 5.2 06/07/2021     Lab Results   Component Value Date    TROPONINI 0.32 (H) 06/07/2021     Lab Results   Component Value Date    CHOL 180 06/07/2021     Lab Results   Component Value Date    TRIG 72 06/07/2021     Lab Results   Component Value Date    HDL 67 (H) 06/07/2021     Lab Results   Component Value Date    LDLCALC 99 06/07/2021     Lab Results   Component Value Date    LABVLDL 14 06/07/2021     No results found for: CHOLHDLRATIO      Cardiac Data        Echo:   ROSIO 3/9/12:  CONCLUSIONS-       1. Limited transesophageal echocardiogram but overall left   ventricular function does appear to be reduced.       2.  No evidence of intracardiac thrombus.       Limited TTE 3/9/12:  FINDINGS-  This is a limited echocardiogram for evaluation of   pericardial effusion. Nelda Clare is a small hypoechoic space anterior to   the heart which may represent a small pericardial effusion versus   pericardial fat pad.       CONCLUSIONS-  Possible small anteroapical pericardial effusion   versus anterior fat pad.      TTE 12/19/13:  Conclusions    Summary    Normal left ventricle size, wall thickness and systolic function with an    estimated ejection fraction of 55%. No regional wall motion abnormalities    are seen. Normal diastolic function.    The mitral valve is normal in structure and function. Trace of mitral    regurgitation.    The aortic valve is normal in structure and function. No evidence of aortic    valve regurgitation.    The tricuspid valve is normal in structure and function. Trivial tricuspid    regurgitation.    The IVC is normal .    no intracardiac mass, vegetations or thrombi. TTE 6/6/21:  Summary   Technically difficult examination. Normal left ventricular systolic function with ejection fraction of 55-60%. No regional wall motion abnormalites are seen. Normal left ventricular size with mild concentric left ventricular   hypertrophy. Grade I diastolic dysfunction with normal filling pressure. Compared to previous study from 12- no changes noted in left   ventricular function. A bubble study was performed and fails to show evidence of shunting.      Stress Test:  GXT Nuclear SPECT Stress 6/30/09:  CONCLUSIONS-   1. Limited transesophageal echocardiogram but overall left   ventricular function does appear to be reduced.       2. No evidence of intracardiac thrombus.       Cath:  [facility] 3/12/12:  Normal coronary arteries     Other Studies:   Non-contrast head CT 6/6/21:  No acute intracranial abnormality.     CTA head/neck 6/6/21:  No acute arterial abnormality or hemodynamically significant arterial   stenosis in the head or neck. CTA Chest 6/7/21:    FINDINGS:   Aorta:  Aorta and great vessels are normal in size and appearance.  No   aneurysm or dissection.  Left common carotid artery arises from the   brachiocephalic artery.       Pulmonary Arteries: Pulmonary arteries are adequately opacified for   evaluation.  No evidence of intraluminal filling defect to suggest pulmonary   embolism.  Main pulmonary artery is normal in caliber.       Mediastinum: No evidence of mediastinal lymphadenopathy.  The heart and pericardium demonstrate no acute abnormality.  There is no acute abnormality   of the thoracic aorta.  Thyroid appears normal.  No mediastinal hematoma or   abnormal widening present.       Lungs/pleura: The lungs are without acute process.  No focal consolidation or   pulmonary edema.  No evidence of pleural effusion or pneumothorax.  Slight   bibasilar dependent atelectasis.       Upper Abdomen: Gallbladder is contracted related to recent meal.  No acute   abnormality.       Soft Tissues/Bones: No acute bone or soft tissue abnormality.           Impression   No evidence of pulmonary embolism or acute pulmonary abnormality.  No   mediastinal hematoma, adenopathy, or other significant abnormality.  Slight   dependent atelectasis.           Brain MRI without contrast 6/7/21:  Punctate acute infarct in the right hippocampus.  No significant mass effect   or midline shift.       Mild chronic small vessel ischemic disease and mild atrophy. Assessment:      1. Elevated Troponin - Occurred in setting of acute stroke. She has not had any angina and overall presentation was not consistent with ACS. She does have multiple risk factors for coronary artery disease and will obtain nuclear SPECT stress for further cardiac risk stratification. 2. Paroxysmal atrial fibrillation - S/p cryoablation in 2012. Anticoagulation previously discontinued by EP. Given recent CVA, have arranged for 30-day cardiac event monitor (which patient is still wearing) to evaluate for recurrent a-fib and if detected, would plan to resume and refer back to EP. 3. Essential hypertension - BP currently controlled. 4. CVA  5. Previous tobacco use      Plan:     1. Recommend GXT nuclear SPECT stress test for further cardiac risk stratification. OK to convert to [facility] if does not reach target heart rate with exercise.   2. Will follow-up on results of 30-day cardiac event monitor once monitoring period is complete and data have been uploaded for review. 3. Continue aspirin 81 mg daily and atorvastatin 80 mg daily. 4. Continue irbesartan 150 mg daily. 5. Has follow-up scheduled with neurology. 6. Follow-up with me in 6 weeks. This note was scribed in the presence of Justice Edwards DO by Ely Regan RN. It is a pleasure to assist in the care of Taniya Ospina. Please call with any questions. The scribes documentation has been prepared under my direction and personally reviewed by me in its entirety. I confirm that the note above accurately reflects all work, treatment, procedures, and medical decision making performed by me. I, Dr. Titi Wu, personally performed the services described in this documentation as scribed by Ely Regan RN in my presence, and it is both accurate and complete to the best of our ability. Titi Wu DO  [facility], [place], operated by [facility]  [phone] [facility]  [phone] [address] [place] [address]  6/15/2021 4:30 PM    I will address the patient's cardiac risk factors and adjusted pharmacologic treatment as needed. In addition, I have reinforced the need for patient directed risk factor modification. All questions and concerns were addressed to the patient/family. Alternatives to my treatment were discussed. The note was completed using EMR. Every effort was made to ensure accuracy; however, inadvertent computerized transcription errors may be present.

## 2021-06-21 ASSESSMENT — ENCOUNTER SYMPTOMS
PHOTOPHOBIA: 0
EYE PAIN: 0
VOMITING: 0
SHORTNESS OF BREATH: 0
SORE THROAT: 0
DIARRHEA: 0
BLOOD IN STOOL: 0
NAUSEA: 0
RHINORRHEA: 0
ABDOMINAL PAIN: 0
CONSTIPATION: 0
COUGH: 0

## 2021-07-08 DIAGNOSIS — I21.4 NSTEMI (NON-ST ELEVATED MYOCARDIAL INFARCTION) (HCC): Primary | ICD-10-CM

## 2021-07-08 DIAGNOSIS — R94.31 ABNORMAL EKG: ICD-10-CM

## 2021-07-08 DIAGNOSIS — R77.8 ELEVATED TROPONIN I LEVEL: ICD-10-CM

## 2021-07-15 ENCOUNTER — APPOINTMENT (OUTPATIENT)
Dept: CT IMAGING | Age: 76
DRG: 068 | End: 2021-07-15
Payer: MEDICARE

## 2021-07-15 ENCOUNTER — APPOINTMENT (OUTPATIENT)
Dept: GENERAL RADIOLOGY | Age: 76
DRG: 068 | End: 2021-07-15
Payer: MEDICARE

## 2021-07-15 ENCOUNTER — HOSPITAL ENCOUNTER (INPATIENT)
Age: 76
LOS: 1 days | Discharge: HOME | DRG: 068 | End: 2021-07-16
Attending: EMERGENCY MEDICINE | Admitting: INTERNAL MEDICINE
Payer: MEDICARE

## 2021-07-15 DIAGNOSIS — R47.01 APHASIA: Primary | ICD-10-CM

## 2021-07-15 DIAGNOSIS — I63.9 CEREBROVASCULAR ACCIDENT (CVA), UNSPECIFIED MECHANISM (HCC): ICD-10-CM

## 2021-07-15 LAB
A/G RATIO: 1.5 (ref 1.1–2.2)
ALBUMIN SERPL-MCNC: 4.3 G/DL (ref 3.4–5)
ALP BLD-CCNC: 79 U/L (ref 40–129)
ALT SERPL-CCNC: 25 U/L (ref 10–40)
ANION GAP SERPL CALCULATED.3IONS-SCNC: 12 MMOL/L (ref 3–16)
AST SERPL-CCNC: 24 U/L (ref 15–37)
BASOPHILS ABSOLUTE: 0.1 K/UL (ref 0–0.2)
BASOPHILS RELATIVE PERCENT: 0.8 %
BILIRUB SERPL-MCNC: 0.4 MG/DL (ref 0–1)
BILIRUBIN URINE: NEGATIVE
BLOOD, URINE: NEGATIVE
BUN BLDV-MCNC: 26 MG/DL (ref 7–20)
CALCIUM SERPL-MCNC: 9.8 MG/DL (ref 8.3–10.6)
CHLORIDE BLD-SCNC: 103 MMOL/L (ref 99–110)
CLARITY: CLEAR
CO2: 23 MMOL/L (ref 21–32)
COLOR: YELLOW
CREAT SERPL-MCNC: 0.7 MG/DL (ref 0.6–1.2)
EKG ATRIAL RATE: 90 BPM
EKG DIAGNOSIS: NORMAL
EKG P AXIS: 68 DEGREES
EKG P-R INTERVAL: 170 MS
EKG Q-T INTERVAL: 374 MS
EKG QRS DURATION: 92 MS
EKG QTC CALCULATION (BAZETT): 457 MS
EKG R AXIS: 80 DEGREES
EKG T AXIS: 70 DEGREES
EKG VENTRICULAR RATE: 90 BPM
EOSINOPHILS ABSOLUTE: 0.1 K/UL (ref 0–0.6)
EOSINOPHILS RELATIVE PERCENT: 2 %
GFR AFRICAN AMERICAN: >60
GFR NON-AFRICAN AMERICAN: >60
GLOBULIN: 2.9 G/DL
GLUCOSE BLD-MCNC: 106 MG/DL (ref 70–99)
GLUCOSE URINE: NEGATIVE MG/DL
HCT VFR BLD CALC: 42.1 % (ref 36–48)
HEMOGLOBIN: 14.4 G/DL (ref 12–16)
INR BLD: 1.12 (ref 0.88–1.12)
KETONES, URINE: NEGATIVE MG/DL
LEUKOCYTE ESTERASE, URINE: NEGATIVE
LYMPHOCYTES ABSOLUTE: 1.8 K/UL (ref 1–5.1)
LYMPHOCYTES RELATIVE PERCENT: 27 %
MCH RBC QN AUTO: 30.1 PG (ref 26–34)
MCHC RBC AUTO-ENTMCNC: 34.1 G/DL (ref 31–36)
MCV RBC AUTO: 88.4 FL (ref 80–100)
MICROSCOPIC EXAMINATION: NORMAL
MONOCYTES ABSOLUTE: 0.7 K/UL (ref 0–1.3)
MONOCYTES RELATIVE PERCENT: 10.1 %
NEUTROPHILS ABSOLUTE: 4 K/UL (ref 1.7–7.7)
NEUTROPHILS RELATIVE PERCENT: 60.1 %
NITRITE, URINE: NEGATIVE
PDW BLD-RTO: 12.8 % (ref 12.4–15.4)
PH UA: 5.5 (ref 5–8)
PLATELET # BLD: 206 K/UL (ref 135–450)
PMV BLD AUTO: 7.5 FL (ref 5–10.5)
POTASSIUM SERPL-SCNC: 3.9 MMOL/L (ref 3.5–5.1)
PROTEIN UA: NEGATIVE MG/DL
PROTHROMBIN TIME: 12.7 SEC (ref 9.9–12.7)
RBC # BLD: 4.76 M/UL (ref 4–5.2)
SODIUM BLD-SCNC: 138 MMOL/L (ref 136–145)
SPECIFIC GRAVITY UA: 1.01 (ref 1–1.03)
SPECIMEN STATUS: NORMAL
TOTAL PROTEIN: 7.2 G/DL (ref 6.4–8.2)
TROPONIN: <0.01 NG/ML
URINE TYPE: NORMAL
UROBILINOGEN, URINE: 0.2 E.U./DL
WBC # BLD: 6.6 K/UL (ref 4–11)

## 2021-07-15 PROCEDURE — 2580000003 HC RX 258: Performed by: INTERNAL MEDICINE

## 2021-07-15 PROCEDURE — 6360000004 HC RX CONTRAST MEDICATION: Performed by: EMERGENCY MEDICINE

## 2021-07-15 PROCEDURE — 71045 X-RAY EXAM CHEST 1 VIEW: CPT

## 2021-07-15 PROCEDURE — 1200000000 HC SEMI PRIVATE

## 2021-07-15 PROCEDURE — 6360000002 HC RX W HCPCS: Performed by: EMERGENCY MEDICINE

## 2021-07-15 PROCEDURE — 2580000003 HC RX 258: Performed by: EMERGENCY MEDICINE

## 2021-07-15 PROCEDURE — 81003 URINALYSIS AUTO W/O SCOPE: CPT

## 2021-07-15 PROCEDURE — 85025 COMPLETE CBC W/AUTO DIFF WBC: CPT

## 2021-07-15 PROCEDURE — 70496 CT ANGIOGRAPHY HEAD: CPT

## 2021-07-15 PROCEDURE — 93010 ELECTROCARDIOGRAM REPORT: CPT | Performed by: INTERNAL MEDICINE

## 2021-07-15 PROCEDURE — 80053 COMPREHEN METABOLIC PANEL: CPT

## 2021-07-15 PROCEDURE — 99283 EMERGENCY DEPT VISIT LOW MDM: CPT

## 2021-07-15 PROCEDURE — 6370000000 HC RX 637 (ALT 250 FOR IP): Performed by: INTERNAL MEDICINE

## 2021-07-15 PROCEDURE — 70450 CT HEAD/BRAIN W/O DYE: CPT

## 2021-07-15 PROCEDURE — 85610 PROTHROMBIN TIME: CPT

## 2021-07-15 PROCEDURE — 93005 ELECTROCARDIOGRAM TRACING: CPT | Performed by: EMERGENCY MEDICINE

## 2021-07-15 PROCEDURE — 84484 ASSAY OF TROPONIN QUANT: CPT

## 2021-07-15 PROCEDURE — 6370000000 HC RX 637 (ALT 250 FOR IP): Performed by: EMERGENCY MEDICINE

## 2021-07-15 PROCEDURE — 93272 ECG/REVIEW INTERPRET ONLY: CPT | Performed by: INTERNAL MEDICINE

## 2021-07-15 RX ORDER — ASPIRIN 81 MG/1
324 TABLET, CHEWABLE ORAL ONCE
Status: COMPLETED | OUTPATIENT
Start: 2021-07-15 | End: 2021-07-15

## 2021-07-15 RX ORDER — ONDANSETRON 4 MG/1
4 TABLET, ORALLY DISINTEGRATING ORAL EVERY 8 HOURS PRN
Status: DISCONTINUED | OUTPATIENT
Start: 2021-07-15 | End: 2021-07-16 | Stop reason: HOSPADM

## 2021-07-15 RX ORDER — VENLAFAXINE HYDROCHLORIDE 37.5 MG/1
75 CAPSULE, EXTENDED RELEASE ORAL DAILY
Status: DISCONTINUED | OUTPATIENT
Start: 2021-07-16 | End: 2021-07-16 | Stop reason: HOSPADM

## 2021-07-15 RX ORDER — DIPHENHYDRAMINE HYDROCHLORIDE 50 MG/ML
12.5 INJECTION INTRAMUSCULAR; INTRAVENOUS ONCE
Status: COMPLETED | OUTPATIENT
Start: 2021-07-15 | End: 2021-07-15

## 2021-07-15 RX ORDER — ONDANSETRON 2 MG/ML
4 INJECTION INTRAMUSCULAR; INTRAVENOUS EVERY 6 HOURS PRN
Status: DISCONTINUED | OUTPATIENT
Start: 2021-07-15 | End: 2021-07-16 | Stop reason: HOSPADM

## 2021-07-15 RX ORDER — PANTOPRAZOLE SODIUM 40 MG/1
40 TABLET, DELAYED RELEASE ORAL EVERY MORNING
Status: DISCONTINUED | OUTPATIENT
Start: 2021-07-16 | End: 2021-07-16 | Stop reason: HOSPADM

## 2021-07-15 RX ORDER — METOCLOPRAMIDE HYDROCHLORIDE 5 MG/ML
10 INJECTION INTRAMUSCULAR; INTRAVENOUS ONCE
Status: COMPLETED | OUTPATIENT
Start: 2021-07-15 | End: 2021-07-15

## 2021-07-15 RX ORDER — POLYETHYLENE GLYCOL 3350 17 G/17G
17 POWDER, FOR SOLUTION ORAL DAILY PRN
Status: DISCONTINUED | OUTPATIENT
Start: 2021-07-15 | End: 2021-07-16 | Stop reason: HOSPADM

## 2021-07-15 RX ORDER — 0.9 % SODIUM CHLORIDE 0.9 %
500 INTRAVENOUS SOLUTION INTRAVENOUS ONCE
Status: COMPLETED | OUTPATIENT
Start: 2021-07-15 | End: 2021-07-15

## 2021-07-15 RX ORDER — ASPIRIN 81 MG/1
81 TABLET, CHEWABLE ORAL DAILY
Status: DISCONTINUED | OUTPATIENT
Start: 2021-07-16 | End: 2021-07-16 | Stop reason: HOSPADM

## 2021-07-15 RX ORDER — BUTALBITAL, ACETAMINOPHEN AND CAFFEINE 50; 325; 40 MG/1; MG/1; MG/1
1 TABLET ORAL ONCE
Status: COMPLETED | OUTPATIENT
Start: 2021-07-15 | End: 2021-07-15

## 2021-07-15 RX ORDER — SODIUM CHLORIDE 0.9 % (FLUSH) 0.9 %
5-40 SYRINGE (ML) INJECTION EVERY 12 HOURS SCHEDULED
Status: DISCONTINUED | OUTPATIENT
Start: 2021-07-15 | End: 2021-07-16 | Stop reason: HOSPADM

## 2021-07-15 RX ORDER — SODIUM CHLORIDE 9 MG/ML
25 INJECTION, SOLUTION INTRAVENOUS PRN
Status: DISCONTINUED | OUTPATIENT
Start: 2021-07-15 | End: 2021-07-16 | Stop reason: HOSPADM

## 2021-07-15 RX ORDER — TRAZODONE HYDROCHLORIDE 50 MG/1
50 TABLET ORAL NIGHTLY
Status: DISCONTINUED | OUTPATIENT
Start: 2021-07-15 | End: 2021-07-16 | Stop reason: HOSPADM

## 2021-07-15 RX ORDER — SODIUM CHLORIDE 0.9 % (FLUSH) 0.9 %
5-40 SYRINGE (ML) INJECTION PRN
Status: DISCONTINUED | OUTPATIENT
Start: 2021-07-15 | End: 2021-07-16 | Stop reason: HOSPADM

## 2021-07-15 RX ORDER — LOSARTAN POTASSIUM 25 MG/1
25 TABLET ORAL DAILY
Status: DISCONTINUED | OUTPATIENT
Start: 2021-07-16 | End: 2021-07-16

## 2021-07-15 RX ORDER — ATORVASTATIN CALCIUM 80 MG/1
80 TABLET, FILM COATED ORAL NIGHTLY
Status: DISCONTINUED | OUTPATIENT
Start: 2021-07-15 | End: 2021-07-16 | Stop reason: HOSPADM

## 2021-07-15 RX ADMIN — Medication 10 ML: at 22:40

## 2021-07-15 RX ADMIN — IOPAMIDOL 75 ML: 755 INJECTION, SOLUTION INTRAVENOUS at 14:43

## 2021-07-15 RX ADMIN — SODIUM CHLORIDE 500 ML: 9 INJECTION, SOLUTION INTRAVENOUS at 16:28

## 2021-07-15 RX ADMIN — ASPIRIN 324 MG: 81 TABLET, CHEWABLE ORAL at 16:29

## 2021-07-15 RX ADMIN — TRAZODONE HYDROCHLORIDE 50 MG: 50 TABLET ORAL at 22:38

## 2021-07-15 RX ADMIN — BUTALBITAL, ACETAMINOPHEN, AND CAFFEINE 1 TABLET: 50; 325; 40 TABLET ORAL at 21:34

## 2021-07-15 RX ADMIN — METOCLOPRAMIDE HYDROCHLORIDE 10 MG: 5 INJECTION INTRAMUSCULAR; INTRAVENOUS at 16:29

## 2021-07-15 RX ADMIN — ATORVASTATIN CALCIUM 80 MG: 80 TABLET, FILM COATED ORAL at 22:38

## 2021-07-15 RX ADMIN — DIPHENHYDRAMINE HYDROCHLORIDE 12.5 MG: 50 INJECTION INTRAMUSCULAR; INTRAVENOUS at 16:29

## 2021-07-15 ASSESSMENT — PAIN SCALES - GENERAL
PAINLEVEL_OUTOF10: 2
PAINLEVEL_OUTOF10: 8
PAINLEVEL_OUTOF10: 6

## 2021-07-15 ASSESSMENT — ENCOUNTER SYMPTOMS
ABDOMINAL PAIN: 0
SHORTNESS OF BREATH: 0

## 2021-07-15 NOTE — H&P
mouth nightly    Historical Provider, MD   atorvastatin (LIPITOR) 80 MG tablet Take 1 tablet by mouth nightly 6/8/21   Chantell Dupont MD   tiZANidine (ZANAFLEX) 4 MG tablet Take 1 tablet by mouth 2 times daily as needed (muscle spasms) 11/12/18   BERNA Alarcon   ibuprofen (ADVIL;MOTRIN) 200 MG tablet Take 200 mg by mouth every 6 hours as needed for Pain    Historical Provider, MD   calcium carbonate 600 MG TABS tablet Take 1 tablet by mouth 2 times daily as needed    Historical Provider, MD   diclofenac (VOLTAREN) 75 MG EC tablet Take 1 tablet by mouth 2 times daily 1/4/16   Shelley Lora MD   aspirin 81 MG tablet Take 81 mg by mouth daily. Historical Provider, MD   venlafaxine (EFFEXOR-XR) 75 MG XR capsule Take 75 mg by mouth daily. Historical Provider, MD       Allergies:  Cephalexin and Pcn [penicillins]    Social History:      The patient currently lives at home    TOBACCO:   reports that she quit smoking about 42 years ago. She has never used smokeless tobacco.  ETOH:   reports current alcohol use of about 5.0 standard drinks of alcohol per week. E-Cigarettes/Vaping Use     Questions Responses    E-Cigarette/Vaping Use     Start Date     Passive Exposure     Quit Date     Counseling Given     Comments             Family History:       Reviewed in detail and negative for DM, CAD, Cancer, CVA. Positive as follows:    No family history on file. REVIEW OF SYSTEMS COMPLETED:   Pertinent positives as noted in the HPI. All other systems reviewed and negative. PHYSICAL EXAM PERFORMED:    BP (!) 159/79   Pulse 79   Temp 98.6 °F (37 °C) (Oral)   Resp 22   Wt 129 lb (58.5 kg)   SpO2 100%   BMI 21.47 kg/m²     General appearance:  No apparent distress, appears stated age and cooperative. HEENT:  Normal cephalic, atraumatic without obvious deformity. Pupils equal, round, and reactive to light. Extra ocular muscles intact. Conjunctivae/corneas clear. Neck: Supple, with full range of motion. No jugular venous distention. Trachea midline. Respiratory:  Normal respiratory effort. Clear to auscultation, bilaterally without Rales/Wheezes/Rhonchi. Cardiovascular:  Regular rate and rhythm with normal S1/S2 without murmurs, rubs or gallops. Abdomen: Soft, non-tender, non-distended with normal bowel sounds. Musculoskeletal:  No clubbing, cyanosis or edema bilaterally. Full range of motion without deformity. Skin: Skin color, texture, turgor normal.  No rashes or lesions. Neurologic:  Neurovascularly intact without any focal sensory/motor deficits. Cranial nerves: II-XII intact, grossly non-focal.  Psychiatric:  Alert and oriented, thought content appropriate, normal insight  Capillary Refill: Brisk,3 seconds, normal  Peripheral Pulses: +2 palpable, equal bilaterally       Labs:     Recent Labs     07/15/21  1436   WBC 6.6   HGB 14.4   HCT 42.1        Recent Labs     07/15/21  1436      K 3.9      CO2 23   BUN 26*   CREATININE 0.7   CALCIUM 9.8     Recent Labs     07/15/21  1436   AST 24   ALT 25   BILITOT 0.4   ALKPHOS 79     Recent Labs     07/15/21  1450   INR 1.12     Recent Labs     07/15/21  1436   TROPONINI <0.01       Urinalysis:      Lab Results   Component Value Date    NITRU Negative 07/15/2021    BLOODU Negative 07/15/2021    SPECGRAV 1.010 07/15/2021    GLUCOSEU Negative 07/15/2021       Radiology:     CXR: I have reviewed the CXR with the following interpretation:   EKG:  I have reviewed the EKG with the following interpretation:   Normal sinus rhythmPossible Left atrial enlargementRSR' or QR pattern in V1 suggests right ventricular conduction delayNonspecific ST abnormalityAbnormal ECGWhen compared with ECG of 06-JUN-2021 21:07,RSR' or QR pattern in V1 suggests right ventricular conduction delayConfirmed by Lor Kinney MD, APRIL     XR CHEST PORTABLE   Final Result   No acute process.          CTA HEAD NECK W CONTRAST   Final Result   Unremarkable CTA of the head and neck.         CT HEAD WO CONTRAST   Final Result   No hemorrhage or mass identified. Scattered small vessel ischemic change is   seen, similar to prior      Right sphenoid sinus disease      Results discussed with Javier WILSON by Sury Salinas. Toni Reyes MD at 2:48 pm on   7/15/2021             ASSESSMENT:    C/Leander Walker [address] Problems    Diagnosis Date Noted    Aphasia [R47.01] 07/15/2021         PLAN:  Transient aphasia/visual changes-TIA versus CVA-admit, aspirin, statin, neurochecks, PT OT, speech therapy, MRI of the brain and neurology evaluation  She had echocardiogram in June 2021 no evidence of shunt and ejection fraction preserved    History of recent right hippocampus CVA    Atrial fibrillation-history of ablation no need for anticoagulation per cardiology    Hypertension-Avapro      DVT Prophylaxis: Lovenox  Diet: Cardiac  Code Status: Full code    PT/OT Eval Status: Ordered    Dispo -admitted to [facility] 5 telemetry       Sheng Fajardo MD    Thank you Marian Toscano for the opportunity to be involved in this patient's care. If you have any questions or concerns please feel free to contact me at [phone].

## 2021-07-15 NOTE — ED PROVIDER NOTES
[address] ENCOUNTER        Pt Name: Margaret Marin  MRN: [id number]  [place] 1945  Date of evaluation: 7/15/2021  Provider: Benito Kehr, MD  PCP: [id number]       Chief Complaint   Patient presents with    Aphasia     pt comes to ED appears very anxious but sts that at approx 1130 difficulty finding words. Denies unilateral weakness. pt does have expressive aphasia. HISTORY OFPRESENT ILLNESS   (Location/Symptom, Timing/Onset, Context/Setting, Quality, Duration, Modifying Factors,Severity)  Note limiting factors. Margaret Marin is a 77 y.o. female presenting today due to concern for reportedly developing trouble with speech around 11:30 AM this morning. 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 She is currently by herself. No weakness or numbness in the arms or legs. No chest pain reported. She denies any headache. She reports acute changes in her symptoms although at this time I cannot confirm this since last time I saw her she was having significant trouble with speech as well. History is limited based on aphasia. REVIEW OF SYSTEMS    (2-9 systems for level 4, 10 or more for level 5)     Review of Systems   Unable to perform ROS: Mental status change   Constitutional: Negative for fever. Eyes: Negative for visual disturbance. Respiratory: Negative for shortness of breath. Cardiovascular: Negative for chest pain. Gastrointestinal: Negative for abdominal pain. Musculoskeletal: Negative for neck pain. Neurological: Positive for speech difficulty. Negative for weakness, numbness and headaches. Psychiatric/Behavioral: Positive for confusion. Aphasia so limited     Positives and Pertinent negatives as per HPI.       PASTMEDICAL HISTORY     Past Medical History:   Diagnosis Date    Anesthesia complication     Low B/P after atrial ablation (SBP 60)    Anxiety     Atrial fibrillation (HCC)     CAD (coronary artery disease) 12    at fib    Rotator cuff tendinitis 2/15/2016         SURGICAL HISTORY       Past Surgical History:   Procedure Laterality Date    ATRIAL ABLATION SURGERY  3/2012         CURRENT MEDICATIONS       Current Discharge Medication List      CONTINUE these medications which have NOT CHANGED    Details   Probiotic Product (PROBIOTIC DAILY PO) Take by mouth      Omeprazole (PRILOSEC PO) Take by mouth      irbesartan (AVAPRO) 150 MG tablet Take 150 mg by mouth nightly      Cetirizine HCl (ZYRTEC PO) Take by mouth      traZODone (DESYREL) 50 MG tablet Take 50 mg by mouth nightly      atorvastatin (LIPITOR) 80 MG tablet Take 1 tablet by mouth nightly  Qty: 30 tablet, Refills: 3      calcium carbonate 600 MG TABS tablet Take 1 tablet by mouth 2 times daily as needed      aspirin 81 MG tablet Take 81 mg by mouth daily. venlafaxine (EFFEXOR-XR) 75 MG XR capsule Take 75 mg by mouth daily. ALLERGIES     Cephalexin and Pcn [penicillins]    FAMILY HISTORY     No family history on file. SOCIAL HISTORY       Social History     Socioeconomic History    Marital status:      Spouse name: Not on file    Number of children: Not on file    Years of education: Not on file    Highest education level: Not on file   Occupational History    Not on file   Tobacco Use    Smoking status: Former Smoker     Quit date: 1979     Years since quittin.4    Smokeless tobacco: Never Used   Substance and Sexual Activity    Alcohol use:  Yes     Alcohol/week: 5.0 standard drinks     Types: 5 Glasses of wine per week    Drug use: No    Sexual activity: Yes     Partners: Male   Other Topics Concern    Not on file   Social History Narrative    Not on file     Social Determinants of Health     Financial Resource Strain:     Difficulty of Paying Living Expenses:    Food General: She is awake. She is in acute distress (mild). Appearance: Normal appearance. She is well-developed, well-groomed and normal weight. She is not ill-appearing, toxic-appearing or diaphoretic. Interventions: She is not intubated. HENT:      Head: Normocephalic and atraumatic. Right Ear: External ear normal.      Left Ear: External ear normal.      Nose: Nose normal.      Mouth/Throat:      Mouth: Mucous membranes are moist.   Eyes:      General: Visual field deficit (left lower quadrant) present. Right eye: No discharge. Left eye: No discharge. Extraocular Movements: Extraocular movements intact. Right eye: Normal extraocular motion and no nystagmus. Left eye: Normal extraocular motion and no nystagmus. Conjunctiva/sclera: Conjunctivae normal.      Pupils: Pupils are equal, round, and reactive to light. Pupils are equal.      Right eye: Pupil is round, reactive and not sluggish. Left eye: Pupil is round, reactive and not sluggish. Visual Fields:      Right eye: CF in the upper temporal quadrant. CF in the upper nasal quadrant. CF in the lower temporal quadrant. ABS in the lower nasal quadrant. Left eye: CF in the upper nasal quadrant. CF in the upper temporal quadrant. CF in the lower nasal quadrant. ABS in the lower temporal quadrant. Neck:      Trachea: Trachea normal. No tracheal deviation. Cardiovascular:      Rate and Rhythm: Normal rate and regular rhythm. Pulses: Normal pulses. Radial pulses are 2+ on the right side and 2+ on the left side. Pulmonary:      Effort: Pulmonary effort is normal. No tachypnea, bradypnea, accessory muscle usage, prolonged expiration, respiratory distress or retractions. She is not intubated. Breath sounds: Normal breath sounds and air entry. No stridor, decreased air movement or transmitted upper airway sounds. No decreased breath sounds, wheezing, rhonchi or rales.    Chest: Chest wall: No tenderness. Abdominal:      General: Bowel sounds are normal. There is no distension. Palpations: Abdomen is soft. Abdomen is not rigid. Tenderness: There is no abdominal tenderness. There is no guarding or rebound. Negative signs include Gonzales's sign and McBurney's sign. Musculoskeletal:         General: No swelling, tenderness, deformity or signs of injury. Normal range of motion. Cervical back: Full passive range of motion without pain, normal range of motion and neck supple. No edema, erythema, signs of trauma, rigidity, torticollis, tenderness or crepitus. No pain with movement, spinous process tenderness or muscular tenderness. Normal range of motion. Skin:     General: Skin is warm and dry. Coloration: Skin is not jaundiced or pale. Findings: No bruising, erythema, lesion or rash. Neurological:      Mental Status: She is alert. She is disoriented and confused. GCS: GCS eye subscore is 4. GCS verbal subscore is 5. GCS motor subscore is 6. Cranial Nerves: Cranial nerve deficit (aphasia ) and dysarthria present. No facial asymmetry. Sensory: Sensation is intact. No sensory deficit. Motor: Motor function is intact. No weakness, tremor, atrophy, abnormal muscle tone, seizure activity or pronator drift. Coordination: Coordination is intact. Coordination normal. Finger-Nose-Finger Test and Heel to [facility] Test normal.      Gait: Gait is intact. Gait normal.      Comments: NIHSS = 3 on arrival at 1425  Aphasia present so limited with orientation questions    Psychiatric:         Attention and Perception: Attention normal.         Mood and Affect: Affect normal. Mood is anxious. Speech: Speech is not delayed. Behavior: Behavior normal. Behavior is cooperative.              DIAGNOSTIC RESULTS   :    Labs Reviewed   COMPREHENSIVE METABOLIC PANEL - Abnormal; Notable for the following components:       Result Value    Glucose 106 (*) BUN 26 (*)     All other components within normal limits    Narrative:     Performed at:  [address],  [place], [address]   Phone [phone]   CBC WITH AUTO DIFFERENTIAL    Narrative:     Performed at:  [address],  [place], [address]   Phone [phone]   PROTIME-INR    Narrative:     Performed at:  [address],  [place], [address]   Phone [phone]   URINALYSIS    Narrative:     Performed at:  [address],  [place], [address]   Phone [phone]   TROPONIN    Narrative:     Performed at:  [address],  [place], [address]   Phone [phone]   SAMPLE POSSIBLE BLOOD BANK TESTING    Narrative:     Performed at:  [address],  [place], [address]   Phone [phone]   CBC   HEMOGLOBIN A1C   LIPID PANEL       All other labs were within normal range or not returned asof this dictation. EKG: All EKG's are interpreted by the Emergency Department Physician who either signs or Co-signs this chart in the absence of a cardiologist.    The Ekg interpreted by me shows  normal sinus rhythm with a rate of 90  Axis is   Normal  QTc is  normal  Intervals and Durations are unremarkable. ST Segments: no acute change and nonspecific changes  No significant change from prior EKG dated - 6/6/21  No STEMI           RADIOLOGY:   Non-plain film images such as CT, Ultrasound and MRI are read by the radiologist. Magdaline Fujita images are visualized and preliminarily interpreted by the  ED Provider with the belowfindings:        Interpretation per the Radiologist below, if available at the time of this note:    XR CHEST PORTABLE   Final Result   No acute process.          CTA HEAD NECK W CONTRAST   Final Result Unremarkable CTA of the head and neck. CT HEAD WO CONTRAST   Final Result   No hemorrhage or mass identified. Scattered small vessel ischemic change is   seen, similar to prior      Right sphenoid sinus disease      Results discussed with Rosario WILSON by Koko Alva. Shweta Andrade MD at 2:48 pm on   7/15/2021         MRI brain without contrast    (Results Pending)         PROCEDURES   Unless otherwise noted below, none     Procedures    CRITICAL CARE TIME   Time: 15 minutes  Includes repeat examinations, speaking with consultants, lab interpretation, charting, assessing for acute stroke   Excludes separate billable procedures. Patient at risk for serious decompensation if not treated for this life-threatening condition. CONSULTS: Spoke with Dr. Quentin Pearson at 9070 and she recommended no TPA due to recent stroke on June 6th. Spoke with Dr. Zainab Beltran for admission at [phone].     IP CONSULT TO STROKE TEAM  IP CONSULT TO HOSPITALIST  IP CONSULT TO NEUROLOGY    EMERGENCY DEPARTMENT COURSE and DIFFERENTIAL DIAGNOSIS/MDM:   Vitals:    Vitals:    07/15/21 2100 07/15/21 2115 07/15/21 2203 07/15/21 2226   BP:  128/85 (!) 143/87    Pulse: 67  67    Resp: 17  14    Temp:   98 °F (36.7 °C)    TempSrc:   Oral    SpO2: 95%  95%    Weight:    129 lb (58.5 kg)   Height:    5' 5\" (1.651 m)       Patient was given the following medications:  Medications   aspirin chewable tablet 81 mg (has no administration in time range)   atorvastatin (LIPITOR) tablet 80 mg (80 mg Oral Given 7/15/21 2238)   losartan (COZAAR) tablet 25 mg (has no administration in time range)   pantoprazole (PROTONIX) tablet 40 mg (has no administration in time range)   traZODone (DESYREL) tablet 50 mg (50 mg Oral Given 7/15/21 2238)   venlafaxine (EFFEXOR XR) extended release capsule 75 mg (has no administration in time range)   sodium chloride flush 0.9 % injection 5-40 mL (10 mLs Intravenous Given 7/15/21 2240)   sodium chloride flush 0.9 % injection 5-40 mL (has no administration in time range)   0.9 % sodium chloride infusion (has no administration in time range)   ondansetron (ZOFRAN-ODT) disintegrating tablet 4 mg (has no administration in time range)     Or   ondansetron (ZOFRAN) injection 4 mg (has no administration in time range)   polyethylene glycol (GLYCOLAX) packet 17 g (has no administration in time range)   enoxaparin (LOVENOX) injection 40 mg (has no administration in time range)   iopamidol (ISOVUE-370) 76 % injection 75 mL (75 mLs Intravenous Given 7/15/21 1443)   metoclopramide (REGLAN) injection 10 mg (10 mg Intravenous Given 7/15/21 1629)   diphenhydrAMINE (BENADRYL) injection 12.5 mg (12.5 mg Intravenous Given 7/15/21 1629)   0.9 % sodium chloride bolus (0 mLs Intravenous Stopped 7/15/21 1720)   aspirin chewable tablet 324 mg (324 mg Oral Given 7/15/21 1629)   butalbital-acetaminophen-caffeine (FIORICET, ESGIC) per tablet 1 tablet (1 tablet Oral Given 7/15/21 2134)       Patient was evaluated due to concern for aphasia starting reportedly at 11:30 AM this morning. She had an acute stroke June 6, 2021 and therefore is not a TPA candidate. Based on medical history, does appear that she has a history of atrial fibrillation which could be causing her strokes. CT did not show any acute changes. I did finally reevaluate the patient just prior to admission and at that time her aphasia had mostly improved and she was speaking in full sentences and able to get most of her thoughts out. Her vision also improved. At this point, stroke evaluation would be NIH of 1 based on very mild aphasia. She had no memory issues at this time which was reassuring compared to when I saw her last month. She denies any chest pain. She reports having a prior procedure in regards to atrial fibrillation and reports not having this for years. She also reportedly had a recent heart monitor for the last 30 days and denied knowing of any worrisome findings with this. She feels comfortable with admission to assess for possibility of new stroke. She was stable for the floor with telemetry. The patient tolerated their visit well. The patient and / or the family were informed of the results of any tests, a time was given to answer questions. FINAL IMPRESSION      1. Aphasia    2. Cerebrovascular accident (CVA), unspecified mechanism ([facility] [address]          DISPOSITION/PLAN   DISPOSITION  - decision to admit      PATIENT REFERRED TO:  No follow-up provider specified.     DISCHARGEMEDICATIONS:  Current Discharge Medication List          DISCONTINUED MEDICATIONS:  Current Discharge Medication List      STOP taking these medications       tiZANidine (ZANAFLEX) 4 MG tablet Comments:   Reason for Stopping:         ibuprofen (ADVIL;MOTRIN) 200 MG tablet Comments:   Reason for Stopping:         diclofenac (VOLTAREN) 75 MG EC tablet Comments:   Reason for Stopping:                      (Please note that portions of this note were completed with a voicerecognition program.  Efforts were made to edit the dictations but occasionally words are mis-transcribed.)    Rick Herrera MD (electronically signed)           Rick Herrera MD  07/16/21 0006

## 2021-07-15 NOTE — ED NOTES
Pt back to room on monitor.  Report given to [facility], RN primary      Vi Cage RN  07/15/21 5605

## 2021-07-15 NOTE — ED NOTES
26 - Dr James Watt activated pt as a Code Stroke  Re: aphasia  9809 - called CT, table open in 5 mins  1434 - Dr Jah Marquez called back to speak with Dr James Watt  [phone] - called lab to be on lookout for pt's blood     Maryanne Kyle  07/15/21 1443

## 2021-07-15 NOTE — PROGRESS NOTES
Stroke Team Brief Phone Note    Called for 76 yo female with h/o R hippocampal infarct (6/6/21), a fib, HTN, who presents with some \"speech difficulties\" starting ~11:30 AM today. Provider noted mild word-finding difficulties, mild dysarthria and lower R quadrantopia. None of the symptoms are disabling. Reviewed CT head - no acute process noted  Reviewed CTA - no LVO    Given low NIHSS with non-disabling deficits and stroke within the past month (although it is punctate, and therefore unlikely to cause large ICH), will defer tPA treatment at this time. - Advise admission for stroke work-up, as current prophylaxis from prior stroke was insufficient to prevent additional stroke.     - Allow blood pressure to autoregulate  - MRI brain  - ECHO to look for cardiac thrombus - bubble study unnecessary, as ECHO from 6/6/21 showed no evidence of shunting  - Total chol 180 (6/7/21)   - A1c 5.2 (6/7/21)  - Neurology consult  - PT/OT/SLP    Please notify with any additional questions or concerns    Yary Park MD   Stroke Team

## 2021-07-15 NOTE — ED NOTES
MD Maldonado at bedside for Stroke eval     [facility]  07/15/21 4558

## 2021-07-16 ENCOUNTER — APPOINTMENT (OUTPATIENT)
Dept: MRI IMAGING | Age: 76
DRG: 068 | End: 2021-07-16
Payer: MEDICARE

## 2021-07-16 VITALS
WEIGHT: 129 LBS | OXYGEN SATURATION: 95 % | SYSTOLIC BLOOD PRESSURE: 126 MMHG | TEMPERATURE: 97.8 F | HEART RATE: 63 BPM | RESPIRATION RATE: 16 BRPM | BODY MASS INDEX: 21.49 KG/M2 | HEIGHT: 65 IN | DIASTOLIC BLOOD PRESSURE: 76 MMHG

## 2021-07-16 LAB
CHOLESTEROL, TOTAL: 118 MG/DL (ref 0–199)
ESTIMATED AVERAGE GLUCOSE: 105.4 MG/DL
HBA1C MFR BLD: 5.3 %
HCT VFR BLD CALC: 36.9 % (ref 36–48)
HDLC SERPL-MCNC: 49 MG/DL (ref 40–60)
HEMOGLOBIN: 12.7 G/DL (ref 12–16)
LDL CHOLESTEROL CALCULATED: 45 MG/DL
MCH RBC QN AUTO: 30.1 PG (ref 26–34)
MCHC RBC AUTO-ENTMCNC: 34.4 G/DL (ref 31–36)
MCV RBC AUTO: 87.4 FL (ref 80–100)
PDW BLD-RTO: 12.8 % (ref 12.4–15.4)
PLATELET # BLD: 177 K/UL (ref 135–450)
PMV BLD AUTO: 7.6 FL (ref 5–10.5)
RBC # BLD: 4.22 M/UL (ref 4–5.2)
TRIGL SERPL-MCNC: 122 MG/DL (ref 0–150)
VLDLC SERPL CALC-MCNC: 24 MG/DL
WBC # BLD: 5.5 K/UL (ref 4–11)

## 2021-07-16 PROCEDURE — 97161 PT EVAL LOW COMPLEX 20 MIN: CPT

## 2021-07-16 PROCEDURE — 99222 1ST HOSP IP/OBS MODERATE 55: CPT | Performed by: PSYCHIATRY & NEUROLOGY

## 2021-07-16 PROCEDURE — 6370000000 HC RX 637 (ALT 250 FOR IP): Performed by: NURSE PRACTITIONER

## 2021-07-16 PROCEDURE — 36415 COLL VENOUS BLD VENIPUNCTURE: CPT

## 2021-07-16 PROCEDURE — 85027 COMPLETE CBC AUTOMATED: CPT

## 2021-07-16 PROCEDURE — 83036 HEMOGLOBIN GLYCOSYLATED A1C: CPT

## 2021-07-16 PROCEDURE — 80061 LIPID PANEL: CPT

## 2021-07-16 PROCEDURE — 70551 MRI BRAIN STEM W/O DYE: CPT

## 2021-07-16 PROCEDURE — 97535 SELF CARE MNGMENT TRAINING: CPT

## 2021-07-16 PROCEDURE — 2580000003 HC RX 258: Performed by: INTERNAL MEDICINE

## 2021-07-16 PROCEDURE — 92610 EVALUATE SWALLOWING FUNCTION: CPT

## 2021-07-16 PROCEDURE — 6360000002 HC RX W HCPCS: Performed by: INTERNAL MEDICINE

## 2021-07-16 PROCEDURE — 97116 GAIT TRAINING THERAPY: CPT

## 2021-07-16 PROCEDURE — 6370000000 HC RX 637 (ALT 250 FOR IP): Performed by: INTERNAL MEDICINE

## 2021-07-16 PROCEDURE — 97165 OT EVAL LOW COMPLEX 30 MIN: CPT

## 2021-07-16 RX ORDER — IBUPROFEN 400 MG/1
400 TABLET ORAL EVERY 8 HOURS PRN
Status: DISCONTINUED | OUTPATIENT
Start: 2021-07-16 | End: 2021-07-16 | Stop reason: HOSPADM

## 2021-07-16 RX ORDER — ACETAMINOPHEN 500 MG
1000 TABLET ORAL EVERY 6 HOURS PRN
Status: DISCONTINUED | OUTPATIENT
Start: 2021-07-16 | End: 2021-07-16 | Stop reason: HOSPADM

## 2021-07-16 RX ORDER — LOSARTAN POTASSIUM 25 MG/1
25 TABLET ORAL DAILY
Status: DISCONTINUED | OUTPATIENT
Start: 2021-07-17 | End: 2021-07-16 | Stop reason: HOSPADM

## 2021-07-16 RX ADMIN — PANTOPRAZOLE SODIUM 40 MG: 40 TABLET, DELAYED RELEASE ORAL at 08:35

## 2021-07-16 RX ADMIN — LOSARTAN POTASSIUM 25 MG: 25 TABLET, FILM COATED ORAL at 08:35

## 2021-07-16 RX ADMIN — ACETAMINOPHEN 1000 MG: 500 TABLET ORAL at 04:58

## 2021-07-16 RX ADMIN — Medication 10 ML: at 08:36

## 2021-07-16 RX ADMIN — ENOXAPARIN SODIUM 40 MG: 40 INJECTION SUBCUTANEOUS at 08:35

## 2021-07-16 RX ADMIN — ACETAMINOPHEN 1000 MG: 500 TABLET ORAL at 12:22

## 2021-07-16 RX ADMIN — VENLAFAXINE HYDROCHLORIDE 75 MG: 37.5 CAPSULE, EXTENDED RELEASE ORAL at 08:35

## 2021-07-16 RX ADMIN — ASPIRIN 81 MG: 81 TABLET, CHEWABLE ORAL at 08:35

## 2021-07-16 RX ADMIN — IBUPROFEN 400 MG: 400 TABLET, FILM COATED ORAL at 08:35

## 2021-07-16 ASSESSMENT — PAIN DESCRIPTION - LOCATION
LOCATION: HEAD

## 2021-07-16 ASSESSMENT — PAIN DESCRIPTION - ONSET
ONSET: ON-GOING
ONSET: ON-GOING

## 2021-07-16 ASSESSMENT — PAIN DESCRIPTION - FREQUENCY
FREQUENCY: CONTINUOUS
FREQUENCY: CONTINUOUS

## 2021-07-16 ASSESSMENT — PAIN DESCRIPTION - PROGRESSION
CLINICAL_PROGRESSION: NOT CHANGED
CLINICAL_PROGRESSION: NOT CHANGED

## 2021-07-16 ASSESSMENT — PAIN DESCRIPTION - PAIN TYPE
TYPE: ACUTE PAIN

## 2021-07-16 ASSESSMENT — PAIN DESCRIPTION - DESCRIPTORS
DESCRIPTORS: ACHING
DESCRIPTORS: HEADACHE
DESCRIPTORS: HEADACHE
DESCRIPTORS: ACHING

## 2021-07-16 ASSESSMENT — PAIN DESCRIPTION - ORIENTATION: ORIENTATION: UPPER

## 2021-07-16 ASSESSMENT — PAIN SCALES - GENERAL
PAINLEVEL_OUTOF10: 4
PAINLEVEL_OUTOF10: 7
PAINLEVEL_OUTOF10: 7
PAINLEVEL_OUTOF10: 4
PAINLEVEL_OUTOF10: 7
PAINLEVEL_OUTOF10: 4

## 2021-07-16 ASSESSMENT — PAIN - FUNCTIONAL ASSESSMENT
PAIN_FUNCTIONAL_ASSESSMENT: PREVENTS OR INTERFERES SOME ACTIVE ACTIVITIES AND ADLS
PAIN_FUNCTIONAL_ASSESSMENT: PREVENTS OR INTERFERES SOME ACTIVE ACTIVITIES AND ADLS

## 2021-07-16 NOTE — PROGRESS NOTES
Occupational Therapy   Occupational Therapy Initial Assessment/Discharge   Date: 2021   Patient Name: Lukasz Sepulveda  MRN: [id number]     : 1945    Date of Service: 2021    Discharge Recommendations:  Home with assist PRN  OT Equipment Recommendations  Equipment Needed: No    Assessment   Assessment: Pt functioning at baseline with ADLs and mobility. No additional acute OT needs at this time. Pt safe to d/c home with PRN assist. Recommend following up with PCP about driving restrictions. Prognosis: Good  Decision Making: Low Complexity  OT Education: OT Role;Plan of Care  REQUIRES OT FOLLOW UP: No  Activity Tolerance  Activity Tolerance: Patient Tolerated treatment well  Safety Devices  Safety Devices in place: Yes  Type of devices: Left in chair;Call light within reach           Patient Diagnosis(es): The primary encounter diagnosis was Aphasia. A diagnosis of Cerebrovascular accident (CVA), unspecified mechanism ([address] was also pertinent to this visit. has a past medical history of Anesthesia complication, Anxiety, Atrial fibrillation ([address] CAD (coronary artery disease), and Rotator cuff tendinitis. has a past surgical history that includes Atrial ablation surgery (3/2012). Restrictions  Restrictions/Precautions  Restrictions/Precautions: Up as Tolerated  Position Activity Restriction  Other position/activity restrictions: Moderate fall risk per RN assessment    Subjective   General  Chart Reviewed: Yes, Imaging  Patient assessed for rehabilitation services?: Yes  Additional Pertinent Hx: right hippocampus stroke on   Family / Caregiver Present: Yes  Referring Practitioner: Kate Chan MD  Diagnosis: Aphasia  Subjective  Subjective: Pt pleasant, agreeable to OT evaluation.  Pt reports feeling back to \"normal\"  General Comment  Comments: pt reporting 4/10 HA ar rest; RN notified  Patient Currently in Pain: No  Pain Assessment  Pain Assessment: 0-10  Pain Level: 4 (When upright)  Pain Type: Acute pain  Pain Location: Head  Pain Descriptors: Headache  Pain Frequency: Continuous  Pain Onset: On-going  Clinical Progression: Not changed  Functional Pain Assessment: Prevents or interferes some active activities and ADLs  Non-Pharmaceutical Pain Intervention(s): Emotional support;Distraction;Repositioned  Response to Pain Intervention: Patient Satisfied  Vital Signs  Pulse: 63  Heart Rate Source: Monitor  BP: 111/67  BP Location: Right upper arm  Patient Position: Sitting  Patient Currently in Pain: No  Oxygen Therapy  SpO2: 95 %  Pulse Oximeter Device Mode: Intermittent  O2 Device: None (Room air)  Social/Functional History  Social/Functional History  Lives With: Alone  Type of Home: House  Home Layout: Two level, Bed/Bath upstairs, 1/2 bath on main level  Home Access: Stairs to enter without rails  Entrance Stairs - Number of Steps: 1  Bathroom Shower/Tub: Walk-in shower  Bathroom Toilet: Standard  ADL Assistance: Independent  Homemaking Assistance: Independent  Ambulation Assistance: Independent  Transfer Assistance: Independent  Active : No (Not since previous CVA)  Patient's  Info: Pt reports her doctor had allowed her to drive on slow streets with a responsible person in the passenger seat, did twice with second time she noted she was concentrating very hard in order to drive. Additional Comments: Denies falls. Reports she was doing fine at home after previous admission. Objective   Vision: Impaired  Vision Exceptions: Wears glasses for reading  Hearing: Within functional limits    Orientation  Overall Orientation Status: Within Functional Limits  Observation/Palpation  Posture: Good  Balance  Sitting Balance: Independent  Standing Balance: Independent  Functional Mobility  Functional - Mobility Device: No device  Activity: Retrieve items; To/from bathroom; Other (in hallway; stair management)  Assist Level:  Independent  ADL  Feeding: Independent  Grooming: Independent  Tone RUE  RUE Tone: Normotonic  Tone LUE  LUE Tone: Normotonic  Coordination  Movements Are Fluid And Coordinated: Yes     Bed mobility  Comment: NT; pt in chair at start and end of session  Transfers  Sit to stand: Independent  Stand to sit: Independent  Vision - Basic Assessment  Prior Vision: No visual deficits  Patient Visual Report: No visual complaint reported. Cognition  Overall Cognitive Status: WFL  Perception  Overall Perceptual Status: WFL     Sensation  Overall Sensation Status: WFL        LUE AROM (degrees)  LUE AROM : WFL  Left Hand AROM (degrees)  Left Hand AROM: WFL  RUE AROM (degrees)  RUE AROM : WFL  Right Hand AROM (degrees)  Right Hand AROM: WFL                      Plan   Plan  Times per week: eval and d/c             AM-PAC Score        AM-PAC Inpatient Daily Activity Raw Score: 24 (07/16/21 1010)  AM-PAC Inpatient ADL T-Scale Score : 57.54 (07/16/21 1010)  ADL Inpatient CMS 0-100% Score: 0 (07/16/21 1010)  ADL Inpatient CMS G-Code Modifier : [facility] (07/16/21 1010)    Goals  Short term goals  Time Frame for Short term goals: No acute OT goals indentified;  Pt independent with ADLs       Therapy Time   Individual Concurrent Group Co-treatment   Time In 0940         Time Out 0958         Minutes 18         Timed Code Treatment Minutes: [address], OTR/L

## 2021-07-16 NOTE — PROGRESS NOTES
Assessment: 0-10  Pain Level: 4 (When upright)  Pain Type: Acute pain  Pain Location: Head  Pain Orientation: Upper  Pain Descriptors: Headache  Pain Frequency: Continuous  Pain Onset: On-going  Clinical Progression: Not changed  Functional Pain Assessment: Prevents or interferes some active activities and ADLs  Non-Pharmaceutical Pain Intervention(s): Emotional support, Distraction, Repositioned  Response to Pain Intervention: Patient Satisfied    Reason for Referral  Fe Woodward was referred for a bedside swallow evaluation to assess the efficiency of her swallow function, identify signs and symptoms of aspiration and make recommendations regarding safe dietary consistencies, effective compensatory strategies, and safe eating environment. Impression  Dysphagia Diagnosis: Swallow function appears grossly intact  Dysphagia Outcome Severity Scale: Level 7: Normal in all situations   Pt seen upright in chair with breakfast, alert and agreeable to evaluation, RN OK'd SLP entry and evaluation and denied concerns regarding speech/lang/cog or dysphagia. Pt's sister at bedside (who also brought pt into hospital per report). Pt reported that all symptoms have resolved. She relayed details regarding recent CVA and that she has been seen by OP ST for high-level cognitive tasks (divided attention, recall, etc.). Pt able to participate in conversation with SLP and sister without difficulty. Pt's speech 100% intelligible in conversation as judged by SLP. OME unremarkable. Pt and pt's daughter reported pt with visual deficits and expressive aphasia initially, again, all symptoms have resolved. Formal speech/lang/cog evaluation not indicated at this time. Pt denied dysphagia with PO. Pt observed with regular solid trials and consecutive cup sips TL with grossly timely swallow initiation and no overt s/s of aspiration/penetration.   Further ST not indicated during acute stay -- pt has f/u scheduled with OP ST \"in about 2 weeks\" per pt. She denied questions / concerns for SLP, verbalized understanding of all reviewed information / strategies. Continue current Regular solid diet with thin liquids, meds whole with TL. Vitals/labs:   Temp: n/a  SpO2: 95%  RR: no WOB noted  BP: 11/67  HR: 63      Treatment Plan  Requires SLP Intervention: No (Not indicated during acute stay; pt has f/u scheduled with OP ST)  Duration/Frequency of Treatment: Not indicated during acute stay; pt has f/u scheduled with OP ST  D/C Recommendations: Home independently       Recommended Diet and Intervention  Diet Solids Recommendation: Regular  Liquid Consistency Recommendation: Thin  Recommended Form of Meds: PO     Therapeutic Interventions: Patient/Family education    Compensatory Swallowing Strategies  Compensatory Swallowing Strategies: Alternate solids and liquids;Eat/Feed slowly;Upright as possible for all oral intake;Small bites/sips; Remain upright for 30-45 minutes after meals    Treatment/Goals:(Not indicated during acute stay; pt has f/u scheduled with OP ST)       General  Chart Reviewed: Yes  Comments: Pt admitted d/t CVA, aphasia. Pt has PMHx of R hippocampal recent CVA (June 2020)  Behavior/Cognition: Alert; Cooperative;Pleasant mood  Respiratory Status: Room air  O2 Device: None (Room air)  Communication Observation: Functional  Follows Directions: Simple  Dentition: Adequate  Patient Positioning: Upright in chair  Baseline Vocal Quality: Normal  Prior Dysphagia History: No hx of dysphagia per chart review. Pt seen by ST at [facility] after recent CVA (May 2021) and completed MoCA scoring a 27/30. The pt is also currently seeing OP ST for high-level cognitive deficits. Consistencies Administered: Reg solid; Thin - cup    Vision/Hearing  Vision  Vision: Impaired  Vision Exceptions: Wears glasses for reading  Hearing  Hearing: Within functional limits    Oral Motor Deficits  Oral/Motor  Oral Motor:  Within functional limits    Oral Phase Dysfunction  Oral phase of swallow grossly appears WNL. No oral phase deficits noted during evaluation. Indicators of Pharyngeal Phase Dysfunction  Pharyngeal phase of swallow appears grossly WNL. No pharyngeal deficits noted during evaluation. Laryngeal elevation appears WFL based upon palpation of anterior neck during swallow. Vocal quality dry before and after po trials. No change in RR or WOB throughout. Prognosis  Prognosis  Prognosis for safe diet advancement: excellent  Individuals consulted  Consulted and agree with results and recommendations: Patient; Family member;RN  Family member consulted: pt's sister    Education  Patient Education:Pt educated on reason for referral, role of ST, assessment results and recommendations. Patient Education Response: Verbalizes understanding  Safety Devices in place: Yes  Type of devices: Call light within reach; Left in chair       Therapy Time  SLP Individual Minutes  Time In: 3520  Time Out: 3667  Minutes: 15; dysphagia yana Carlisle M.S. [address] [facility]  Speech-language pathologist  [phone]

## 2021-07-16 NOTE — PROGRESS NOTES
4 Eyes Skin Assessment     The patient is being assess for   Admission    I agree that 2 RN's have performed a thorough Head to Toe Skin Assessment on the patient. ALL assessment sites listed below have been assessed. Areas assessed for pressure by both nurses:   [x]   Head, Face, and Ears   [x]   Shoulders, Back, and Chest, Abdomen  [x]   Arms, Elbows, and Hands   [x]   Coccyx, Sacrum, and Ischium  [x]   Legs, Feet, and Heels               Co-signer eSignature: Electronically signed by Edith Patel RN on 7/16/21 at 12:46 AM EDT    Does the Patient have Skin Breakdown related to pressure?   No            Jacky Prevention initiated:  NA   Wound Care Orders initiated:  NA      WOC nurse consulted for Pressure Injury (Stage 3,4, Unstageable, DTI, NWPT, Complex wounds)and New or Established Ostomies:  NA      Primary Nurse eSignature: Electronically signed by Jose A Mancilla on 7/16/21 at 12:36 AM EDT

## 2021-07-16 NOTE — CONSULTS
In patient Neurology consult        [facility]      Fabian Hoyer, MD Leilani Olszewski  1945    Date of Service: 2021    Referring Physician: Dr Carrie Bess       Reason for the consult and CC: Acute aphasia and possible stroke    HPI:   The patient is a 77y.o.  years old female with history of hypertension, recent stroke and CAD well-known to me from last month admission, who was admitted to the hospital yesterday with new onset speech impairment. Symptoms started few hours prior to admission. Description was sudden speech and word finding difficulties with difficulties with reading. Degree was severe and duration was minutes separate other associated symptom including eye floaters from both eyes. No severe headache or weakness or numbness or chest pain. No other leaving or aggravating factors. No clear triggers. By the time she came to the ED, symptoms improved. She was admitted. Further work-up with MRI brain showed no acute stroke. CTA showed no new large vessel occlusion. She was taking aspirin at home. Seen by cardiology recently and had 4 weeks event monitor. Unaware of the results. History of A. fib and status post ablation in the past.  Other review of system was unremarkable. Vaishali history: Noncontributory      Past Surgical History:   Procedure Laterality Date    ATRIAL ABLATION SURGERY  3/2012        Past Medical History:   Diagnosis Date    Anesthesia complication     Low B/P after atrial ablation (SBP 60)    Anxiety     Atrial fibrillation (HCC)     CAD (coronary artery disease) 12    at fib    Rotator cuff tendinitis 2/15/2016     Social History     Tobacco Use    Smoking status: Former Smoker     Quit date: 1979     Years since quittin.4    Smokeless tobacco: Never Used   Substance Use Topics    Alcohol use:  Yes     Alcohol/week: 5.0 standard drinks     Types: 5 Glasses of wine per week    Drug use: No     Allergies   Allergen Reactions  Cephalexin Hives and Swelling    Pcn [Penicillins] Hives and Swelling     Current Facility-Administered Medications   Medication Dose Route Frequency Provider Last Rate Last Admin    acetaminophen (TYLENOL) tablet 1,000 mg  1,000 mg Oral Q6H PRN Chasitylj Gloria, APRN - NP   1,000 mg at 07/16/21 1222    ibuprofen (ADVIL;MOTRIN) tablet 400 mg  400 mg Oral Q8H PRN MARKUS Salinas - CNP   400 mg at 07/16/21 0835    [START ON 7/17/2021] losartan (COZAAR) tablet 25 mg  25 mg Oral Daily Angeline Shea APRN - CNP        aspirin chewable tablet 81 mg  81 mg Oral Daily Georgia Torrez MD   81 mg at 07/16/21 0835    atorvastatin (LIPITOR) tablet 80 mg  80 mg Oral Nightly Georgia Torrez MD   80 mg at 07/15/21 2238    pantoprazole (PROTONIX) tablet 40 mg  40 mg Oral QAM Georgia Torrez MD   40 mg at 07/16/21 0835    traZODone (DESYREL) tablet 50 mg  50 mg Oral Nightly Georgia Torrez MD   50 mg at 07/15/21 2238    venlafaxine (EFFEXOR XR) extended release capsule 75 mg  75 mg Oral Daily Georgia Torrez MD   75 mg at 07/16/21 0835    sodium chloride flush 0.9 % injection 5-40 mL  5-40 mL Intravenous 2 times per day Georgia Torrez MD   10 mL at 07/16/21 0836    sodium chloride flush 0.9 % injection 5-40 mL  5-40 mL Intravenous PRN Georgia Torrez MD        0.9 % sodium chloride infusion  25 mL Intravenous PRN Georgia Trorez MD        ondansetron (ZOFRAN-ODT) disintegrating tablet 4 mg  4 mg Oral Q8H PRN Georgia Torrez MD        Or    ondansetron (ZOFRAN) injection 4 mg  4 mg Intravenous Q6H PRN Georgia Torrez MD        polyethylene glycol (GLYCOLAX) packet 17 g  17 g Oral Daily PRN Georgia Torrez MD        enoxaparin (LOVENOX) injection 40 mg  40 mg Subcutaneous Daily Georgia Torrez MD   40 mg at 07/16/21 2347     Current Outpatient Medications   Medication Sig Dispense Refill    Probiotic Product (PROBIOTIC DAILY PO) Take by mouth      Omeprazole (PRILOSEC PO) Take by mouth      irbesartan (AVAPRO) 150 MG tablet Take 150 mg by mouth nightly      Cetirizine HCl (ZYRTEC PO) Take by mouth      traZODone (DESYREL) 50 MG tablet Take 50 mg by mouth nightly      atorvastatin (LIPITOR) 80 MG tablet Take 1 tablet by mouth nightly 30 tablet 3    calcium carbonate 600 MG TABS tablet Take 1 tablet by mouth 2 times daily as needed      aspirin 81 MG tablet Take 81 mg by mouth daily.  venlafaxine (EFFEXOR-XR) 75 MG XR capsule Take 75 mg by mouth daily. ROS : A 10-14 system review of constitutional, cardiovascular, respiratory, eyes, musculoskeletal, endocrine, GI, ENT, skin, hematological, genitourinary, psychiatric and neurologic systems was obtained and updated today and is unremarkable except as mentioned in my HPI      Exam:     Constitutional:   Vitals:    07/16/21 0350 07/16/21 0751 07/16/21 0944 07/16/21 1215   BP: 102/61 106/63 111/67 126/76   Pulse: 65 61 63 63   Resp: 14 16 16   Temp: 98.2 °F (36.8 °C) 97.4 °F (36.3 °C)  97.8 °F (36.6 °C)   TempSrc: Oral Oral  Oral   SpO2: 96% 95% 95% 95%   Weight:       Height:           General appearance:  Normal development and appear in no acute distress. Eye:  Fundus of the eye: Funduscopic examination cannot be performed due to COVID19 restrictions and precautions. Neck: supple  Cardiovascular: No lower leg edema with good pulsation. Mental Status:   Oriented to person, place, problem, and time. Memory: Good immediate recall. Intact remote memory  Normal attention span and concentration. Language: intact naming, repeating and fluency   Good fund of Knowledge. Aware of current events and vocabulary   Cranial Nerves:   II: Visual fields: Full. Pupils: equal, round, reactive to light  III,IV,VI: Extra Ocular Movements are intact.  No nystagmus  V: Facial sensation is intact   VII: Facial strength and movements: intact and symmetric  VIII: Hearing: Intact  IX: Palate elevation is symmetric  XI: Shoulder shrug is intact  XII: Tongue movements are normal  Musculoskeletal: 5/5 in all 4 extremities. Tone: Normal tone. Reflexes: 2+ in the upper extremity and 2+ in the lower extremity   Planters: flexor bilaterally. Coordination: no pronator drift, no dysmetria with FNF in upper extremities. Normal REM. Sensation: normal to all modalities in both arms and legs. Gait/Posture: steady gait    Data:  LABS:   Lab Results   Component Value Date     07/15/2021    K 3.9 07/15/2021     07/15/2021    CO2 23 07/15/2021    BUN 26 07/15/2021    CREATININE 0.7 07/15/2021    GFRAA >60 07/15/2021    GFRAA >60 03/13/2012    LABGLOM >60 07/15/2021    GLUCOSE 106 07/15/2021    MG 1.90 06/06/2021    CALCIUM 9.8 07/15/2021     Lab Results   Component Value Date    WBC 5.5 07/16/2021    RBC 4.22 07/16/2021    HGB 12.7 07/16/2021    HCT 36.9 07/16/2021    MCV 87.4 07/16/2021    RDW 12.8 07/16/2021     07/16/2021     Lab Results   Component Value Date    INR 1.12 07/15/2021    PROTIME 12.7 07/15/2021       Neuroimaging were independently reviewed by me and discussed results with the patient  Reviewed notes from different physicians  Reviewed lab and blood testing    Impression:  Acute aphasia, resolved. Possible TIA. History of recent right temporal stroke  History of A. fib and ablation  Hypertension  Hyperlipidemia    Recommendation:     Long discussion with the patient regarding stroke prevention and risk of recurrence    Continue with aspirin and add Plavix for 3 weeks and then aspirin alone  Follow-up with her cardiologist to follow on the results of her recent event monitor  Other option that can consider with her cardiologist is to start [address] given history of A. fib in the past  Continue home blood pressure medication  Statin  Can be discharged from neurology    No further recommendation    Thank you for referring such patient. If you have any questions regarding my consult note, please don't hesitate to call me. Cheryle Solomon MD  [phone]    This dictation was generated by voice recognition computer software.  Although all attempts are made to edit the dictation for accuracy, there may be errors in the  transcription that are not intended

## 2021-07-16 NOTE — DISCHARGE INSTR - COC
Continuity of Care Form    Patient Name: Guanako James   :  1945  MRN:  [id number]    Admit date:  7/15/2021  Discharge date:  ***    Code Status Order: Full Code   Advance Directives:     Admitting Physician:  Aydee Ramirez MD  PCP: Olaf Sanabria    Discharging Nurse: [facility] Unit/Room#: 9822/2260-43  Discharging Unit Phone Number: ***    Emergency Contact:   Extended Emergency Contact Information  Primary Emergency Contact: [place] of [address] [place] [address] Phone: [phone]  Mobile Phone: [phone]  Relation: Brother/Sister  Secondary Emergency Contact: Gurpreet Arreaga  Phone: [phone]  Mobile Phone: [phone]  Relation: Other    Past Surgical History:  Past Surgical History:   Procedure Laterality Date    ATRIAL ABLATION SURGERY  3/2012       Immunization History:   Immunization History   Administered Date(s) Administered    COVID-19, GAMAL Soto, 100mcg/0.5mL 2021, 2021    Pneumococcal Polysaccharide ([id number]) 2012       Active Problems:  Patient Active Problem List   Diagnosis Code    Atrial fibrillation ([facility] [address] I48.91    Atrial flutter (HCC) I48.92    Rotator cuff tendinitis M75.80    Complete tear of right rotator cuff M75.121    TGA (transient global amnesia) G45.4    TIA involving basilar artery G45.0    HTN (hypertension), benign I10    NSTEMI (non-ST elevated myocardial infarction) ([facility] [address] I21.4    Elevated blood pressure reading R03.0    Cerebrovascular accident (CVA) ([facility] [address] I63.9    Elevated troponin I level R77.8    Aphasia R47.01       Isolation/Infection:   Isolation          No Isolation        Unreconciled Outside Infections     Enable clinical decision support by reconciling outside information with the patient's chart. .    Infection Onset Last Indicated Last Received Source    No mapped external infections found    .     Unmapped Infections      C. diff 19 [facility] Patient Infection Status     None to display          Nurse Assessment:  Last Vital Signs: /67   Pulse 63   Temp 97.4 °F (36.3 °C) (Oral)   Resp 16   Ht 5' 5\" (1.651 m)   Wt 129 lb (58.5 kg)   SpO2 95%   BMI 21.47 kg/m²     Last documented pain score (0-10 scale): Pain Level: 4 (When upright)  Last Weight:   Wt Readings from Last 1 Encounters:   07/15/21 129 lb (58.5 kg)     Mental Status:  {IP PT MENTAL STATUS:}    IV Access:  { ELIEZER IV ACCESS:626136723}    Nursing Mobility/ADLs:  Walking   {P DME DRRR:143426865}  Transfer  {P DME UCNW:768785080}  Bathing  {CHP DME VXFW:737373196}  Dressing  {CHP DME SZHB:456841197}  Toileting  {CHP DME DSJF:642441174}  Feeding  {P DME HWWP:659196856}  Med Admin  {P DME UKFJ:037879765}  Med Delivery   { ELIEZER MED Delivery:369064650}    Wound Care Documentation and Therapy:  Incision 12 Mouth (Active)   Number of days: 3353        Elimination:  Continence:   · Bowel: {YES / SA:35685}  · Bladder: {YES / KK:34083}  Urinary Catheter: {Urinary Catheter:694595655}   Colostomy/Ileostomy/Ileal Conduit: {YES / }       Date of Last BM: ***    Intake/Output Summary (Last 24 hours) at 2021 1046  Last data filed at 2021 1040  Gross per 24 hour   Intake 540 ml   Output --   Net 540 ml     I/O last 3 completed shifts:   In: 300 [P.O.:300]  Out: -     Safety Concerns:     508 Results Scorecard Safety Concerns:532157242}    Impairments/Disabilities:      508 Results Scorecard Impairments/Disabilities:684076041}    Nutrition Therapy:  Current Nutrition Therapy:   508 Results Scorecard Diet List:396695190}    Routes of Feeding: {P DME Other Feedings:244759866}  Liquids: {Slp liquid thickness:30389}  Daily Fluid Restriction: {P DME Yes amt example:426860588}  Last Modified Barium Swallow with Video (Video Swallowing Test): {Done Not Done ZJ:546850395}    Treatments at the Time of Hospital Discharge:   Respiratory Treatments: ***  Oxygen Therapy:  {Therapy; copd oxygen:68081}  Ventilator:    { CC Vent SKBI:791724540}    Rehab Therapies: {THERAPEUTIC INTERVENTION:0241783344}  Weight Bearing Status/Restrictions: { CC Weight Bearin}  Other Medical Equipment (for information only, NOT a DME order):  {EQUIPMENT:684439313}  Other Treatments: ***    Patient's personal belongings (please select all that are sent with patient):  {P DME Belongings:645636845}    RN SIGNATURE:  {Esignature:616942229}    CASE MANAGEMENT/SOCIAL WORK SECTION    Inpatient Status Date: ***    Readmission Risk Assessment Score:  Readmission Risk              Risk of Unplanned Readmission:  9           Discharging to Facility/ Agency   · Name:   · Address:  · Phone:  · Fax:    Dialysis Facility (if applicable)   · Name:  · Address:  · Dialysis Schedule:  · Phone:  · Fax:    / signature: {Esignature:174344782}    PHYSICIAN SECTION    Prognosis: {Prognosis:7116928575}    Condition at Discharge: [address] [place] [address] Patient Condition:789138544}    Rehab Potential (if transferring to Rehab): {Prognosis:8263453616}    Recommended Labs or Other Treatments After Discharge: ***    Physician Certification: I certify the above information and transfer of Chago Spencer  is necessary for the continuing treatment of the diagnosis listed and that she requires {Admit to Appropriate Level of Care:83984} for {GREATER/LESS:646780835} 30 days.      Update Admission H&P: {CHP DME Changes in ENKMB:327414440}    PHYSICIAN SIGNATURE:  {Esignature:327177134}

## 2021-07-16 NOTE — PROGRESS NOTES
Physical Therapy    Facility/Department: [facility] C5 - MED SURG/ORTHO  Initial Assessment/Treatment    NAME: Sydnie Guzman  : 1945  MRN: [id number]    Date of Service: 2021    Discharge Recommendations:  Home independently   PT Equipment Recommendations  Equipment Needed: No    Assessment   Assessment: Pt is a 77year old previously independent femald with PMH including CAD, anxiety and Afib who presented 7/15 with aphasia and blurry vision. 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 Imaging (-) for acute CVA. Pt is independent without device at baseline, no difficulty at home alone after last discharge. Upon evaluation, pt is able to ambulate community distances and ascend/descend stairs with modified independence demonstrating good functional balance. Pt is safe to return home independently, no further skilled PT required. Prognosis: Excellent  Decision Making: Low Complexity  PT Education: Disease Specific Education  Patient Education: Pt was educated regarding signs and symptoms of recurrent CVA - verbalized understanding  No Skilled PT: Independent with functional mobility   REQUIRES PT FOLLOW UP: No  Activity Tolerance  Activity Tolerance: Patient Tolerated treatment well  Activity Tolerance: Pretx: 111/67 HR 63bpm, SpO2 95%       Patient Diagnosis(es): The primary encounter diagnosis was Aphasia. A diagnosis of Cerebrovascular accident (CVA), unspecified mechanism ([address] was also pertinent to this visit. has a past medical history of Anesthesia complication, Anxiety, Atrial fibrillation ([address] CAD (coronary artery disease), and Rotator cuff tendinitis. has a past surgical history that includes Atrial ablation surgery (3/2012). Restrictions  Restrictions/Precautions  Restrictions/Precautions: Up as Tolerated  Position Activity Restriction  Other position/activity restrictions:  Moderate fall risk per RN assessment  Vision/Hearing  Vision: Impaired  Vision Exceptions: Wears glasses for reading     Subjective  General  Chart Reviewed: Yes  Patient assessed for rehabilitation services?: Yes  Response To Previous Treatment: Not applicable  Family / Caregiver Present: Yes (Sister)  Referring Practitioner: Riley Guzman MD  Referral Date : 07/15/21  Diagnosis: Aphasia  Follows Commands: Within Functional Limits  General Comment  Comments: RN cleared for therapy  Subjective  Subjective: Pt sitting in chair upon arrival, agreeable to PT tx session.   Pain Screening  Patient Currently in Pain: No  Pain Assessment  Pain Assessment: 0-10  Pain Level: 4 (When upright)  Pain Type: Acute pain  Pain Location: Head  Pain Descriptors: Headache  Pain Frequency: Continuous  Pain Onset: On-going  Clinical Progression: Not changed  Functional Pain Assessment: Prevents or interferes some active activities and ADLs  Non-Pharmaceutical Pain Intervention(s): Emotional support;Distraction;Repositioned  Response to Pain Intervention: Patient Satisfied  Vital Signs  Pulse: 63  Heart Rate Source: Monitor  BP: 111/67  BP Location: Right upper arm  Patient Position: Sitting  Patient Currently in Pain: Yes  Oxygen Therapy  SpO2: 95 %  Pulse Oximeter Device Mode: Intermittent  O2 Device: None (Room air)       Orientation  Orientation  Overall Orientation Status: Within Normal Limits  Social/Functional History  Social/Functional History  Lives With: Alone  Type of Home: House  Home Layout: Two level, Bed/Bath upstairs, 1/2 bath on main level  Home Access: Stairs to enter without rails  Entrance Stairs - Number of Steps: 1  Bathroom Shower/Tub: Walk-in shower  Bathroom Toilet: Standard  ADL Assistance: Independent  Homemaking Assistance: Independent  Ambulation Assistance: Independent  Transfer Assistance: Independent  Active : No (Not since previous CVA)  Patient's  Info: Pt reports her doctor had allowed her to drive on slow streets with a responsible person in the passenger seat, did twice with second time she noted she was concentrating very hard in order to drive. Additional Comments: Denies falls. Reports she was doing fine at home after previous admission. Objective          AROM RLE (degrees)  RLE AROM: WNL  AROM LLE (degrees)  LLE AROM : WNL  AROM RUE (degrees)  RUE AROM : WFL  AROM LUE (degrees)  LUE AROM : WFL  Strength RLE  Strength RLE: WNL  Comment: Grossly 4+-5/5  Strength LLE  Strength LLE: WNL  Comment: Grossly 4+-5/5  Strength RUE  Strength RUE: WFL  Strength LUE  Strength LUE: WFL  Tone RLE  RLE Tone: Normotonic  Tone LLE  LLE Tone: Normotonic  Motor Control  Gross Motor?: WNL  Coordination  Heel to Shin: Normal  Sensation  Overall Sensation Status: WFL  Bed mobility  Supine to Sit: Unable to assess  Sit to Supine: Unable to assess  Comment: Pt seated in chair at beginning and end of session  Transfers  Sit to Stand: Modified independent  Stand to sit: Modified independent  Ambulation  Ambulation?: Yes  Ambulation 1  Surface: level tile  Device: No Device  Assistance: Modified Independent  Quality of Gait: Pt demonstrates reciprocal gait pattern, good gait speed, steady with no LOB including with head turns, negotiation of obstacles and carrying conversation throughout.   Distance: 150ft  Stairs/Curb  Stairs?: Yes  Stairs  # Steps : 6  Stairs Height: 6\"  Rails: Right ascending  Curbs: 6\"  Device: No Device  Assistance: Modified independent      Balance  Posture: Good  Sitting - Static: Good  Sitting - Dynamic: Good  Standing - Static: Good  Standing - Dynamic: Good  Comments: Pt was able to stand and perform ADLs/hygiene with mod I        Plan   Plan  Times per week: 1 visit only  Safety Devices  Type of devices: Call light within reach, Gait belt, Nurse notified, Left in chair  Restraints  Initially in place: No      AM-PAC Score  AM-PAC Inpatient Mobility Raw Score : 24 (07/16/21 1013)  AM-PAC Inpatient T-Scale Score : 61.14 (07/16/21 1013)  Mobility Inpatient CMS 0-100% Score: 0 (07/16/21 1013)  Mobility Inpatient CMS G-Code Modifier : [address] (07/16/21 1013)          Goals  Short term goals  Time Frame for Short term goals: 1 visit only  Short term goal 1: Pt will perform functional transfers with mod I - MET 7/16  Short term goal 2: Pt will ambulate 150ft without device with mod I - MET 7/16  Short term goal 3: Pt will ascend/descend 6 steps with rail and mod I - MET 7/16  Patient Goals   Patient goals : \"To go home and get back to walking\"       Therapy Time   Individual Concurrent Group Co-treatment   Time In 0939         Time Out 0958         Minutes 19         Timed Code Treatment Minutes: 9 Minutes (10 minute evaluation)       Rosibel Hurt PT     If pt is unable to be seen after this session, please let this note serve as discharge summary. Please see case management note for discharge disposition. Thank you.

## 2021-07-16 NOTE — PLAN OF CARE
Problem: Nutrition  Intervention: Swallowing evaluation  Note: Bedside swallow evaluation completed this date. MADHAV Saleh  Speech-language pathologist  [id number]      Intervention: Aspiration precautions  Note: Bedside swallow evaluation completed this date.     MADHAV Saleh  Speech-language pathologist  [id number]

## 2021-07-16 NOTE — PROGRESS NOTES
Pt has discharge orders. Pt is aware of them. Discharge instruction gone over with pt. Pt verbalized understanding. IV took out. Pt gathered his belongings and changed his clothings. Pts family bedside. Pt going home. Pt wheeled down to his ride for discharge.

## 2021-07-16 NOTE — CARE COORDINATION
CASE MANAGEMENT INITIAL ASSESSMENT      Reviewed chart and completed assessment via telephone with: Pt   Explained Case Management role/services. Insurance: Medicare   Precert required for SNF: N       3 night stay required: Y    Living arrangements, Adls, care needs, prior to admission: Pt lives at home alone, independent of ADL'S     Transportation:Family     Durable Medical Equipment at home:  Walker__Cane__RTS__ BSC__Shower Chair__  02__ HHN__ CPAP__  BiPap__  Hospital Bed__ W/C___ Other_None_________    Services in the home and/or outpatient, prior to [address] [facility] (if applicable) n/a   · Name:  · Address:  · Dialysis Schedule:  · Phone:  · Fax:    PT/OT recs: No needs     Hospital Exemption Notification (HEN):N/A     Barriers to discharge: None     Plan/comments: 7/16/21 Pt denies any discharge needs. CM does not anticipate any discharge needs at this time. Please notify Discharge Planner should any needs arise.  CM will sign off      ECOC on chart for MD signature

## 2021-07-19 ENCOUNTER — TELEPHONE (OUTPATIENT)
Dept: CARDIOLOGY CLINIC | Age: 76
End: 2021-07-19

## 2021-07-19 DIAGNOSIS — I48.0 PAROXYSMAL ATRIAL FIBRILLATION (HCC): ICD-10-CM

## 2021-07-19 NOTE — TELEPHONE ENCOUNTER
Pt wanting her results of monitor. Pt has had another mini stroke. Depending on results pt will be placed on blood thinner.

## 2021-07-19 NOTE — TELEPHONE ENCOUNTER
Covering for Sheyla  Monitor did not show AF however [id number] score at least 5 and given history of PAF, anticoagulation is indicated. Could also consider EP referral for possible ILR/further recommendations. Will defer to Dr. Axel Sims as he saw patient most recently in the office. Thanks!

## 2021-07-20 ENCOUNTER — TELEPHONE (OUTPATIENT)
Dept: CARDIOLOGY CLINIC | Age: 76
End: 2021-07-20

## 2021-07-20 NOTE — TELEPHONE ENCOUNTER
Anticoagulation was previously discontinued by EP after her cryoablation. I recommend that she follow-up with EP to further discuss re-initiating therapy. Thanks.

## 2021-07-20 NOTE — TELEPHONE ENCOUNTER
Patient calling for monitor results. She had another TIA 7/15/2021. The neurologist is concerned about possible AFIB.  Patient has had AFIB in the past.   TY

## 2021-07-20 NOTE — TELEPHONE ENCOUNTER
07/20-Called ([phone]) pt has been scheduled for [facility] 08/02 @ 8:15 am per note

## 2021-07-23 ENCOUNTER — HOSPITAL ENCOUNTER (OUTPATIENT)
Dept: CARDIOLOGY | Age: 76
Discharge: HOME | End: 2021-07-23
Payer: MEDICARE

## 2021-07-26 NOTE — PROGRESS NOTES
Physician Progress Note      Fabián BRYANT #:                  [id number]  :                       1945  ADMIT DATE:       7/15/2021 2:34 PM  [address] DATE:        2021 3:55 PM  RESPONDING  PROVIDER #:        AISLINN APARICIO - CNP          QUERY TEXT:    H&P notes- Transient aphasia/visual changes-TIA versus CVA\". Neuro consult-    \"Acute aphasia, resolved. Possible TIA. Further work-up with MRI brain   showed no acute stroke\"  If possible, please document in progress notes and   discharge summary if you are evaluating and /or treating any of the following: The medical record reflects the following:    Risk Factors: afib, recent right hippocampus CVA, HTN, recent  event monitor   at home    Clinical Indicators: Neuro consult-\"Acute aphasia, resolved. Possible TIA. History of A. fib and ablation-aspirin and add Plavix for 3 weeks and then   aspirin [id number] with her cardiologist to follow on the results of her   recent event monitor    Treatment: neuro consult, CT, MRI, Long discussion with the patient regarding   stroke prevention and risk of recurrence,Continue with aspirin and add Plavix   for 3 weeks and then aspirin aloneFollow-up with her cardiologist to follow on   the results of her recent event monitor, Other option that can consider with   her cardiologist is to start [address] given history of A. fib in the past,   Continue home blood pressure medication    Thank-You, Ricarda Guerrero RN, BSN, CCDS  Options provided:  -- Possible Embolic TIA due to cerebral embolism  -- TIA symptoms due to possible Cerebral Artery Occlusion/Stenosis (without   Infarction)  -- TIA symptoms due to other, please specify. .., *** (specify other.   -- Other - I will add my own diagnosis  -- Disagree - Not applicable / Not valid  -- Disagree - Clinically unable to determine / Unknown  -- Refer to Clinical Documentation Reviewer    PROVIDER RESPONSE TEXT:    This patient had a possible embolic TIA due to cerebral embolism.     Query created by: Luma Taylor on 7/21/2021 10:23 AM      Electronically signed by:  Angi Luther CNP 7/26/2021 8:43 AM

## 2021-08-02 ENCOUNTER — TELEPHONE (OUTPATIENT)
Dept: CARDIOLOGY CLINIC | Age: 76
End: 2021-08-02

## 2021-08-02 ENCOUNTER — OFFICE VISIT (OUTPATIENT)
Dept: CARDIOLOGY CLINIC | Age: 76
End: 2021-08-02
Payer: MEDICARE

## 2021-08-02 VITALS
SYSTOLIC BLOOD PRESSURE: 100 MMHG | WEIGHT: 129.5 LBS | DIASTOLIC BLOOD PRESSURE: 60 MMHG | HEART RATE: 68 BPM | OXYGEN SATURATION: 98 % | HEIGHT: 65 IN | BODY MASS INDEX: 21.58 KG/M2

## 2021-08-02 DIAGNOSIS — I48.92 ATRIAL FLUTTER, UNSPECIFIED TYPE (HCC): ICD-10-CM

## 2021-08-02 DIAGNOSIS — I63.9 CEREBROVASCULAR ACCIDENT (CVA), UNSPECIFIED MECHANISM (HCC): ICD-10-CM

## 2021-08-02 DIAGNOSIS — G45.0 TIA INVOLVING BASILAR ARTERY: ICD-10-CM

## 2021-08-02 DIAGNOSIS — I49.9 IRREGULAR HEART RATE: Primary | ICD-10-CM

## 2021-08-02 DIAGNOSIS — I48.0 PAF (PAROXYSMAL ATRIAL FIBRILLATION) (HCC): ICD-10-CM

## 2021-08-02 PROCEDURE — 1090F PRES/ABSN URINE INCON ASSESS: CPT | Performed by: INTERNAL MEDICINE

## 2021-08-02 PROCEDURE — 1111F DSCHRG MED/CURRENT MED MERGE: CPT | Performed by: INTERNAL MEDICINE

## 2021-08-02 PROCEDURE — 1036F TOBACCO NON-USER: CPT | Performed by: INTERNAL MEDICINE

## 2021-08-02 PROCEDURE — 1123F ACP DISCUSS/DSCN MKR DOCD: CPT | Performed by: INTERNAL MEDICINE

## 2021-08-02 PROCEDURE — 4040F PNEUMOC VAC/ADMIN/RCVD: CPT | Performed by: INTERNAL MEDICINE

## 2021-08-02 PROCEDURE — 99215 OFFICE O/P EST HI 40 MIN: CPT | Performed by: INTERNAL MEDICINE

## 2021-08-02 PROCEDURE — 93000 ELECTROCARDIOGRAM COMPLETE: CPT | Performed by: INTERNAL MEDICINE

## 2021-08-02 PROCEDURE — G8399 PT W/DXA RESULTS DOCUMENT: HCPCS | Performed by: INTERNAL MEDICINE

## 2021-08-02 PROCEDURE — G8427 DOCREV CUR MEDS BY ELIG CLIN: HCPCS | Performed by: INTERNAL MEDICINE

## 2021-08-02 PROCEDURE — G8420 CALC BMI NORM PARAMETERS: HCPCS | Performed by: INTERNAL MEDICINE

## 2021-08-02 PROCEDURE — 3017F COLORECTAL CA SCREEN DOC REV: CPT | Performed by: INTERNAL MEDICINE

## 2021-08-02 RX ORDER — CLOPIDOGREL BISULFATE 75 MG/1
75 TABLET ORAL DAILY
COMMUNITY

## 2021-08-02 NOTE — TELEPHONE ENCOUNTER
Patient seen in office 08/02/21. ILR discussed and agreed upon by [facility] and patient. Patient does not need to hold any medications and does not need to be NPO. Patient will not need covid testing prior. Thank you!

## 2021-08-02 NOTE — PROGRESS NOTES
wore a cardiac event monitor 6/8/2021-7/8/2021 that demonstrated predomiantely NSR with an average HR of 66 () BPM, 0.01% PVCs, 0.17% PAC burden, and brief PAT. No AF was observed. Symptoms reported during NSR. Today, 8/2/2021, she reports she is doing well post CVA and TIA. She reports that her weakness, speech and memory issues have resolved. She reports that her BP at home has been well-controlled. She reports that when she had AF in the past, she always knew when she was in AF because she would feel poorly. She reports she is tolerating the plavix well and reports no bleeding or bruising issues. Patient denies current edema, chest pain, sob, palpitations, dizziness or syncope. Past Medical History:   has a past medical history of Anesthesia complication, Anxiety, Atrial fibrillation ([address] CAD (coronary artery disease), and Rotator cuff tendinitis. Surgical History:   has a past surgical history that includes Atrial ablation surgery (3/2012). Social History:   reports that she quit smoking about 42 years ago. She has never used smokeless tobacco. She reports previous alcohol use. She reports that she does not use drugs. Family History:  family history is not on file. Home Medications:  Outpatient Encounter Medications as of 8/2/2021   Medication Sig Dispense Refill    clopidogrel (PLAVIX) 75 MG tablet Take 75 mg by mouth daily      Probiotic Product (PROBIOTIC DAILY PO) Take by mouth      irbesartan (AVAPRO) 150 MG tablet Take 150 mg by mouth nightly      Cetirizine HCl (ZYRTEC PO) Take by mouth      traZODone (DESYREL) 50 MG tablet Take 50 mg by mouth nightly      atorvastatin (LIPITOR) 80 MG tablet Take 1 tablet by mouth nightly 30 tablet 3    calcium carbonate 600 MG TABS tablet Take 1 tablet by mouth 2 times daily as needed      aspirin 81 MG tablet Take 81 mg by mouth daily.  venlafaxine (EFFEXOR-XR) 75 MG XR capsule Take 75 mg by mouth daily.         Omeprazole PAF-cryoablation 3/9/12. No known recurrence since RFCA 2012. Given recent CVA and TIA, ILR recommended for long-term AF surveillance. Discussed risks and benefits. Follow up after procedure. 2. PVC's- not quantified, appear uniform    Plan:  1. Discussed risks and benefits of ILR ([email or web address] implantable loop recorder) given recent CVA, TIA, and history of AF.    -Discussed risks and benefits of starting [address] given recent CVA, TIA, and history of AF if AF is detected on ILR. Continue Plavix at this time. 2. Recommend completing stress test as scheduled. 3. Follow up after procedure or sooner if needed. This note has been scribed in the presence of Kwan Doshi MD, by Crystal Sosa RN.     I, Dr. Kwan Doshi, personally performed the services described in this documentation as scribed by Crystal Sosa RN in my presence, and it is both accurate and complete.       Kwan Doshi M.D.

## 2021-08-02 NOTE — LETTER
[facility]   Cardiac Consultation    Date: 8/2/21  Patient Name: Alize Glass  YOB: 1945    Primary Care Physician: Kevon Borges    CHIEF COMPLAINT:   Chief Complaint   Patient presents with    New Patient    Atrial Fibrillation    Atrial Flutter    Other     CVA, TIA     HPI:  Alize Glass is a 77 y.o. female who is here today for evaluation of AF. She was started on Rythmol in 2012 and then changed to [facility] in 3/2012. She underwent cryoablation on 3/9/12. She had a cardiac cath on 3/12/12 that showed normal coronary arteries. Last echo from 2013 showed normal LV function. Coumadin discontinued 3/2013. At his office visit 8/2018, she stated that she first began to have palpitations around 1 1/2 years ago. She wore a cardiac event monitor 2/3-2/16/2017 that showed single PVC's. She is very active and does aerobics several days a week without difficulty. She stated her palpitations are only rare now. Patient was admitted with an acute CVA 6/6/2021,  Brain MRI during her admission showed a punctate acute infarct in the right hippocampus. Neck CTA of the head/neck did not demonstrate any hemodynamically significant stenoses. No atrial fibrillation was observed on telemetry during her admission and she was discharged with a 30-day cardiac event monitor. Her troponins were elevated at the time of her stroke (peak troponin T 0.39) and TTE with bubble study from 6/8/21 showed an LVEF of 55-60% with normal wall motion, mild concentric LVH, grade 1 diastolic dysfunction, normal RV size and systolic function, and no hemodynamically significant valvular abnormalities. No atrial level shunting was observed. She did not have any angina during admission and her troponin elevation was felt to be secondary to her CVA. Outpatient stress testing was planned for further cardiac risk stratification. Patient presented to the ED 7/2021 and was diagnosed with TIA. Patient wore a cardiac event monitor 6/8/2021-7/8/2021 that demonstrated predomiantely NSR with an average HR of 66 () BPM, 0.01% PVCs, 0.17% PAC burden, and brief PAT. No AF was observed. Symptoms reported during NSR. Today, 8/2/2021, she reports she is doing well post CVA and TIA. She reports that her weakness, speech and memory issues have resolved. She reports that her BP at home has been well-controlled. She reports that when she had AF in the past, she always knew when she was in AF because she would feel poorly. She reports she is tolerating the plavix well and reports no bleeding or bruising issues. Patient denies current edema, chest pain, sob, palpitations, dizziness or syncope. Past Medical History:   has a past medical history of Anesthesia complication, Anxiety, Atrial fibrillation ([address] CAD (coronary artery disease), and Rotator cuff tendinitis. Surgical History:   has a past surgical history that includes Atrial ablation surgery (3/2012). Social History:   reports that she quit smoking about 42 years ago. She has never used smokeless tobacco. She reports previous alcohol use. She reports that she does not use drugs. Family History:  family history is not on file. Home Medications:  Outpatient Encounter Medications as of 8/2/2021   Medication Sig Dispense Refill    clopidogrel (PLAVIX) 75 MG tablet Take 75 mg by mouth daily      Probiotic Product (PROBIOTIC DAILY PO) Take by mouth      irbesartan (AVAPRO) 150 MG tablet Take 150 mg by mouth nightly      Cetirizine HCl (ZYRTEC PO) Take by mouth      traZODone (DESYREL) 50 MG tablet Take 50 mg by mouth nightly      atorvastatin (LIPITOR) 80 MG tablet Take 1 tablet by mouth nightly 30 tablet 3    calcium carbonate 600 MG TABS tablet Take 1 tablet by mouth 2 times daily as needed      aspirin 81 MG tablet Take 81 mg by mouth daily.  venlafaxine (EFFEXOR-XR) 75 MG XR capsule Take 75 mg by mouth daily.         Omeprazole (PRILOSEC PO) Take by mouth (Patient not taking: Reported on 8/2/2021)       No facility-administered encounter medications on file as of 8/2/2021. Allergies:  Cephalexin and Pcn [penicillins]     Review of Systems   Constitutional: Negative. HENT: Negative. Eyes: Negative. Respiratory: Negative. Cardiovascular: Negative. Gastrointestinal: Negative. Genitourinary: Negative. Musculoskeletal: Negative. Skin: Negative. Neurological: Negative. Hematological: Negative. Psychiatric/Behavioral: Negative. /60   Pulse 68   Ht 5' 5\" (1.651 m)   Wt 129 lb 8 oz (58.7 kg)   SpO2 98%   BMI 21.55 kg/m²      Data:    ECG 8/2/21: Personally reviewed. All current cardiac medications reviewed and adjusted accordingly  8/2/21    Echo 6/2021:    Summary   Technically difficult examination. Normal left ventricular systolic function with ejection fraction of 55-60%. No regional wall motion abnormalites are seen. Normal left ventricular size with mild concentric left ventricular   hypertrophy. Grade I diastolic dysfunction with normal filling pressure. Compared to previous study from 12- no changes noted in left   ventricular function. A bubble study was performed and fails to show evidence of shunting      Objective:  Physical Exam   Constitutional: She is oriented to person, place, and time. She appears well-developed and well-nourished. HENT:   Head: Normocephalic and atraumatic. Eyes: Pupils are equal, round, and reactive to light. Neck: Normal range of motion. Cardiovascular: Normal rate, regular rhythm and normal heart sounds. Pulmonary/Chest: Effort normal and breath sounds normal.   Abdominal: Soft. No tenderness. Musculoskeletal: Normal range of motion. She exhibits no edema. Neurological: She is alert and oriented to person, place, and time. Skin: Skin is warm and dry. Psychiatric: She has a normal mood and affect. Assessment:  1. PAF-cryoablation 3/9/12. No known recurrence since RFCA 2012. Given recent CVA and TIA, ILR recommended for long-term AF surveillance. Discussed risks and benefits. Follow up after procedure. 2. PVC's- not quantified, appear uniform    Plan:  1. Discussed risks and benefits of ILR ([employer] implantable loop recorder) given recent CVA, TIA, and history of AF.    -Discussed risks and benefits of starting [facility] given recent CVA, TIA, and history of AF if AF is detected on ILR. Continue Plavix at this time. 2. Recommend completing stress test as scheduled. 3. Follow up after procedure or sooner if needed. This note has been scribed in the presence of Barry Frank MD, by Ganesh Banerjee, RN.     I, Dr. Barry Frank, personally performed the services described in this documentation as scribed by Ganesh Banerjee RN in my presence, and it is both accurate and complete.       Barry Frank M.D.

## 2021-08-02 NOTE — PATIENT INSTRUCTIONS
Plan:  1. Discussed risks and benefits of ILR (implantable loop recorder) given recent CVA, TIA, and history of AF.    -Discussed risks and benefits of starting [address] given recent CVA, TIA, and history of AF if AF is detected on ILR. Continue Plavix at this time. 2. Recommend completing stress test as scheduled. 3. Follow up after procedure or sooner if needed.

## 2021-08-03 ENCOUNTER — HOSPITAL ENCOUNTER (OUTPATIENT)
Dept: CARDIOLOGY | Age: 76
Discharge: HOME | End: 2021-08-03
Payer: MEDICARE

## 2021-08-03 DIAGNOSIS — I21.4 NSTEMI (NON-ST ELEVATED MYOCARDIAL INFARCTION) (HCC): ICD-10-CM

## 2021-08-03 DIAGNOSIS — R94.31 ABNORMAL EKG: ICD-10-CM

## 2021-08-03 DIAGNOSIS — R77.8 ELEVATED TROPONIN I LEVEL: ICD-10-CM

## 2021-08-03 LAB
LV EF: 70 %
LVEF MODALITY: NORMAL

## 2021-08-03 PROCEDURE — 93017 CV STRESS TEST TRACING ONLY: CPT

## 2021-08-03 PROCEDURE — 3430000000 HC RX DIAGNOSTIC RADIOPHARMACEUTICAL: Performed by: INTERNAL MEDICINE

## 2021-08-03 PROCEDURE — A9502 TC99M TETROFOSMIN: HCPCS | Performed by: INTERNAL MEDICINE

## 2021-08-03 PROCEDURE — 78452 HT MUSCLE IMAGE SPECT MULT: CPT

## 2021-08-03 RX ADMIN — TETROFOSMIN 31 MILLICURIE: 1.38 INJECTION, POWDER, LYOPHILIZED, FOR SOLUTION INTRAVENOUS at 09:55

## 2021-08-03 RX ADMIN — TETROFOSMIN 10.4 MILLICURIE: 1.38 INJECTION, POWDER, LYOPHILIZED, FOR SOLUTION INTRAVENOUS at 08:39

## 2021-08-04 ENCOUNTER — TELEPHONE (OUTPATIENT)
Dept: CARDIOLOGY CLINIC | Age: 76
End: 2021-08-04

## 2021-08-04 NOTE — TELEPHONE ENCOUNTER
----- Message from  Karen, DO sent at 8/3/2021  4:54 PM EDT -----  Please let patient know that her stress test was normal.

## 2021-08-12 NOTE — DISCHARGE SUMMARY
Hospital Medicine Discharge Summary    Patient ID: Marion Sands      Patient's PCP: Chi Rueda    Admit Date: 7/15/2021     Discharge Date: 7/16/2021      Admitting Physician: Dulce kSaggs MD     Discharge Physician: MARKUS Morales - CNP     Discharge Diagnoses: Active Hospital Problems    Diagnosis     TIA involving left internal carotid artery [G45.1]     Dyslipidemia [E78.5]     Aphasia [R47.01]     PAF (paroxysmal atrial fibrillation) (HCC) [I48.0]        The patient was seen and examined on day of discharge and this discharge summary is in conjunction with any daily progress note from day of discharge. Hospital Course:     77 y.o. female who presented to [facility] with aphasia. She was recently discharged after being treated for right hippocampal stroke. Possible TIA - symptoms included acute aphasia. - neurology consulted. - continue home ASA, statin. - neurology recommends Plavix and ASA x 3 weeks, then ASA alone. Patient wishes to discuss with PCP and cardiology. - consider DOAC given history of A-fib. - needs event monitor with EP. Hx of A-fib, s/p ablation - currently in NSR. HTN - continue home meds. Physical Exam Performed:     /76   Pulse 63   Temp 97.8 °F (36.6 °C) (Oral)   Resp 16   Ht 5' 5\" (1.651 m)   Wt 129 lb (58.5 kg)   SpO2 95%   BMI 21.47 kg/m²       General appearance:  No apparent distress, appears stated age and cooperative. HEENT:  Normal cephalic, atraumatic without obvious deformity. Pupils equal, round, and reactive to light. Extra ocular muscles intact. Conjunctivae/corneas clear. Neck: Supple, with full range of motion. No jugular venous distention. Trachea midline. Respiratory:  Normal respiratory effort. Clear to auscultation, bilaterally without Rales/Wheezes/Rhonchi. Cardiovascular:  Regular rate and rhythm with normal S1/S2 without murmurs, rubs or gallops.   Abdomen: Soft, non-tender, non-distended with normal bowel sounds. Musculoskeletal:  No clubbing, cyanosis or edema bilaterally. Full range of motion without deformity. Skin: Skin color, texture, turgor normal.  No rashes or lesions. Neurologic:  Neurovascularly intact without any focal sensory/motor deficits. Cranial nerves: II-XII intact, grossly non-focal.  Psychiatric:  Alert and oriented, thought content appropriate, normal insight  Capillary Refill: Brisk,< 3 seconds   Peripheral Pulses: +2 palpable, equal bilaterally       Labs: For convenience and continuity at follow-up the following most recent labs are provided:      CBC:    Lab Results   Component Value Date    WBC 5.5 07/16/2021    HGB 12.7 07/16/2021    HCT 36.9 07/16/2021     07/16/2021       Renal:    Lab Results   Component Value Date     07/15/2021    K 3.9 07/15/2021     07/15/2021    CO2 23 07/15/2021    BUN 26 07/15/2021    CREATININE 0.7 07/15/2021    CALCIUM 9.8 07/15/2021         Significant Diagnostic Studies    Radiology:   MRI brain without contrast   Final Result   1. No acute intracranial abnormality. No acute infarct. 2. Minimal global parenchymal volume loss with mild chronic microvascular   ischemic changes. XR CHEST PORTABLE   Final Result   No acute process. CTA HEAD NECK W CONTRAST   Final Result   Unremarkable CTA of the head and neck. CT HEAD WO CONTRAST   Final Result   No hemorrhage or mass identified. Scattered small vessel ischemic change is   seen, similar to prior      Right sphenoid sinus disease      Results discussed with [place] WILSON by Almita Granda. Za Ivan MD at 2:48 pm on   7/15/2021                Consults:     IP CONSULT TO STROKE TEAM  IP CONSULT TO HOSPITALIST  IP CONSULT TO NEUROLOGY    Disposition:  Home     Condition at Discharge: Stable    Discharge Instructions/Follow-up:  F/u with PCP in 1-2 weeks. F/u with EP to discuss event monitor.       Code Status:  Full    Activity: activity as

## 2021-08-13 ENCOUNTER — HOSPITAL ENCOUNTER (OUTPATIENT)
Dept: CARDIAC CATH/INVASIVE PROCEDURES | Age: 76
Discharge: HOME | End: 2021-08-13
Attending: INTERNAL MEDICINE | Admitting: INTERNAL MEDICINE
Payer: MEDICARE

## 2021-08-13 VITALS — HEIGHT: 65 IN | BODY MASS INDEX: 21.49 KG/M2 | WEIGHT: 129 LBS

## 2021-08-13 LAB
ANION GAP SERPL CALCULATED.3IONS-SCNC: 8 MMOL/L (ref 3–16)
BUN BLDV-MCNC: 29 MG/DL (ref 7–20)
CALCIUM SERPL-MCNC: 9.4 MG/DL (ref 8.3–10.6)
CHLORIDE BLD-SCNC: 105 MMOL/L (ref 99–110)
CO2: 26 MMOL/L (ref 21–32)
CREAT SERPL-MCNC: 0.7 MG/DL (ref 0.6–1.2)
EKG ATRIAL RATE: 55 BPM
EKG DIAGNOSIS: NORMAL
EKG P AXIS: 50 DEGREES
EKG P-R INTERVAL: 178 MS
EKG Q-T INTERVAL: 436 MS
EKG QRS DURATION: 92 MS
EKG QTC CALCULATION (BAZETT): 417 MS
EKG R AXIS: 54 DEGREES
EKG T AXIS: 61 DEGREES
EKG VENTRICULAR RATE: 55 BPM
GFR AFRICAN AMERICAN: >60
GFR NON-AFRICAN AMERICAN: >60
GLUCOSE BLD-MCNC: 96 MG/DL (ref 70–99)
HCT VFR BLD CALC: 38.6 % (ref 36–48)
HEMOGLOBIN: 13 G/DL (ref 12–16)
MCH RBC QN AUTO: 29.9 PG (ref 26–34)
MCHC RBC AUTO-ENTMCNC: 33.8 G/DL (ref 31–36)
MCV RBC AUTO: 88.6 FL (ref 80–100)
PDW BLD-RTO: 12.7 % (ref 12.4–15.4)
PLATELET # BLD: 175 K/UL (ref 135–450)
PMV BLD AUTO: 7.7 FL (ref 5–10.5)
POTASSIUM SERPL-SCNC: 4 MMOL/L (ref 3.5–5.1)
RBC # BLD: 4.36 M/UL (ref 4–5.2)
SODIUM BLD-SCNC: 139 MMOL/L (ref 136–145)
WBC # BLD: 3.4 K/UL (ref 4–11)

## 2021-08-13 PROCEDURE — 2500000003 HC RX 250 WO HCPCS

## 2021-08-13 PROCEDURE — 80048 BASIC METABOLIC PNL TOTAL CA: CPT

## 2021-08-13 PROCEDURE — 85027 COMPLETE CBC AUTOMATED: CPT

## 2021-08-13 PROCEDURE — 33285 INSJ SUBQ CAR RHYTHM MNTR: CPT | Performed by: INTERNAL MEDICINE

## 2021-08-13 PROCEDURE — C1764 EVENT RECORDER, CARDIAC: HCPCS

## 2021-08-13 PROCEDURE — 93005 ELECTROCARDIOGRAM TRACING: CPT

## 2021-08-13 PROCEDURE — 33285 INSJ SUBQ CAR RHYTHM MNTR: CPT

## 2021-08-13 NOTE — H&P
[facility]     CHIEF COMPLAINT:        Chief Complaint   Patient presents with    New Patient    Atrial Fibrillation    Atrial Flutter    Other       CVA, TIA      HPI:  Mendez Valencia is a 77 y.o. female who is here today for evaluation of AF. She was started on Rythmol in 2012 and then changed to [facility] in 3/2012. She underwent cryoablation on 3/9/12. She had a cardiac cath on 3/12/12 that showed normal coronary arteries. Last echo from 2013 showed normal LV function. Coumadin discontinued 3/2013. At his office visit 8/2018, she stated that she first began to have palpitations around 1 1/2 years ago. She wore a cardiac event monitor 2/3-2/16/2017 that showed single PVC's. She is very active and does aerobics several days a week without difficulty. She stated her palpitations are only rare now.                Patient was admitted with an acute CVA 6/6/2021, [facility] MRI during her admission showed a punctate acute infarct in the right hippocampus.  Neck CTA of the head/neck did not demonstrate any hemodynamically significant stenoses.  No atrial fibrillation was observed on telemetry during her admission and she was discharged with a 30-day cardiac event monitor. [facility] troponins were elevated at the time of her stroke (peak troponin T 0.39) and TTE with bubble study from 6/8/21 showed an LVEF of 55-60% with normal wall motion, mild concentric LVH, grade 1 diastolic dysfunction, normal RV size and systolic function, and no hemodynamically significant valvular abnormalities.  No atrial level shunting was observed.  She did not have any angina during admission and her troponin elevation was felt to be secondary to her CVA.  Outpatient stress testing was planned for further cardiac risk stratification.                Patient presented to the  and was diagnosed with TIA.                Patient wore a cardiac event monitor 6/8/2021-7/8/2021 that demonstrated predomiantely NSR with an average HR of 66 () BPM, 0.01% PVCs, 0.17% PAC burden, and brief PAT. No AF was observed. Symptoms reported during NSR. Today, 8/2/2021, she reports she is doing well post CVA and TIA. She reports that her weakness, speech and memory issues have resolved. She reports that her BP at home has been well-controlled. She reports that when she had AF in the past, she always knew when she was in AF because she would feel poorly. She reports she is tolerating the plavix well and reports no bleeding or bruising issues. Patient denies current edema, chest pain, sob, palpitations, dizziness or syncope.      Past Medical History:   has a past medical history of Anesthesia complication, Anxiety, Atrial fibrillation ([address] CAD (coronary artery disease), and Rotator cuff tendinitis.     Surgical History:   has a past surgical history that includes Atrial ablation surgery (3/2012).      Social History:   reports that she quit smoking about 42 years ago. She has never used smokeless tobacco. She reports previous alcohol use.  She reports that she does not use drugs.      Family History:  family history is not on file.      Home Medications:  Encounter Medications          Outpatient Encounter Medications as of 8/2/2021   Medication Sig Dispense Refill    clopidogrel (PLAVIX) 75 MG tablet Take 75 mg by mouth daily        Probiotic Product (PROBIOTIC DAILY PO) Take by mouth        irbesartan (AVAPRO) 150 MG tablet Take 150 mg by mouth nightly        Cetirizine HCl (ZYRTEC PO) Take by mouth        traZODone (DESYREL) 50 MG tablet Take 50 mg by mouth nightly        atorvastatin (LIPITOR) 80 MG tablet Take 1 tablet by mouth nightly 30 tablet 3    calcium carbonate 600 MG TABS tablet Take 1 tablet by mouth 2 times daily as needed        aspirin 81 MG tablet Take 81 mg by mouth daily.        venlafaxine (EFFEXOR-XR) 75 MG XR capsule Take 75 mg by mouth daily.          Omeprazole (PRILOSEC PO) Take by mouth (Patient not taking: Reported on 8/2/2021)          No facility-administered encounter medications on file as of 8/2/2021.            Allergies:  Cephalexin and Pcn [penicillins]      Review of Systems   Constitutional: Negative. HENT: Negative. Eyes: Negative. Respiratory: Negative. Cardiovascular: Negative. Gastrointestinal: Negative. Genitourinary: Negative. Musculoskeletal: Negative. Skin: Negative. Neurological: Negative. Hematological: Negative. Psychiatric/Behavioral: Negative.     /60   Pulse 68   Ht 5' 5\" (1.651 m)   Wt 129 lb 8 oz (58.7 kg)   SpO2 98%   BMI 21.55 kg/m²       Data:     ECG 8/2/21: Personally reviewed.      All current cardiac medications reviewed and adjusted accordingly  8/2/21     Echo 6/2021:   Glenn Chan   Technically difficult examination.   Normal left ventricular systolic function with ejection fraction of 55-60%.   No regional wall motion abnormalites are seen.   Normal left ventricular size with mild concentric left ventricular   hypertrophy.   Grade I diastolic dysfunction with normal filling pressure.   Compared to previous study from 12- no changes noted in left   ventricular function.   A bubble study was performed and fails to show evidence of shunting        Objective:  Physical Exam   Constitutional: She is oriented to person, place, and time. She appears well-developed and well-nourished. HENT:   Head: Normocephalic and atraumatic. Eyes: Pupils are equal, round, and reactive to light. Neck: Normal range of motion. Cardiovascular: Normal rate, regular rhythm and normal heart sounds. Pulmonary/Chest: Effort normal and breath sounds normal.   Abdominal: Soft. No tenderness. Musculoskeletal: Normal range of motion. She exhibits no edema. Neurological: She is alert and oriented to person, place, and time. Skin: Skin is warm and dry. Psychiatric: She has a normal mood and affect.

## 2021-08-16 ENCOUNTER — TELEPHONE (OUTPATIENT)
Dept: CARDIOLOGY | Age: 76
End: 2021-08-16

## 2021-08-16 NOTE — TELEPHONE ENCOUNTER
Pt called stating she had loop recorder put in and she is hurting is this normal? There is no blood or anything just hurts.     pls advise thank you

## 2021-08-23 ENCOUNTER — TELEPHONE (OUTPATIENT)
Dept: CARDIOLOGY CLINIC | Age: 76
End: 2021-08-23

## 2021-08-23 ENCOUNTER — NURSE ONLY (OUTPATIENT)
Dept: CARDIOLOGY CLINIC | Age: 76
End: 2021-08-23

## 2021-08-23 DIAGNOSIS — Z45.09 ENCOUNTER FOR ELECTRONIC ANALYSIS OF REVEAL EVENT RECORDER: Primary | ICD-10-CM

## 2021-08-23 NOTE — TELEPHONE ENCOUNTER
Patient presented to the device clinic today for one week post op appt s/p loop recorder implant. Patient would like to know if she should stay on ASA. She would also like to know who will manage her Lipitor since it was prescribed by the Hospitalist at her last hospital stay.

## 2021-08-23 NOTE — PROGRESS NOTES
Patient comes in for interrogation of their implanted loop recorder. Patient has a history of A Flutter, pAF, TIA, and CVA. Takes Plavix. Patient's device was implanted on 8/13 by Dr. Jose Kasper. Since last interrogation, 2 symptoms recorded show SR w/ ectopy. Patient states she feels like she has an elevated HR and SOB. Patients incision is clean and dry with all dressings removed from site. One prolene suture removed. See [facility] report under the Cardiology tab. We will follow the patient remotely.

## 2021-08-24 NOTE — TELEPHONE ENCOUNTER
Patient is followed by Dr. Smita Fang for cardiology. Per his last office note, it says to continue ASA and statin. Cardiology or PCP can manage statin.

## 2021-08-25 NOTE — TELEPHONE ENCOUNTER
Spoke with the patient and advised her of the message below . Pt voiced understanding .  Call complete

## 2021-09-17 PROCEDURE — 93298 REM INTERROG DEV EVAL SCRMS: CPT | Performed by: INTERNAL MEDICINE

## 2021-09-17 PROCEDURE — G2066 INTER DEVC REMOTE 30D: HCPCS | Performed by: INTERNAL MEDICINE

## 2021-09-22 ENCOUNTER — NURSE ONLY (OUTPATIENT)
Dept: CARDIOLOGY CLINIC | Age: 76
End: 2021-09-22
Payer: MEDICARE

## 2021-09-22 DIAGNOSIS — G45.1 TIA INVOLVING LEFT INTERNAL CAROTID ARTERY: ICD-10-CM

## 2021-09-22 DIAGNOSIS — I48.0 PAF (PAROXYSMAL ATRIAL FIBRILLATION) (HCC): ICD-10-CM

## 2021-09-22 DIAGNOSIS — Z45.09 ENCOUNTER FOR ELECTRONIC ANALYSIS OF REVEAL EVENT RECORDER: ICD-10-CM

## 2021-09-22 DIAGNOSIS — I48.92 ATRIAL FLUTTER, UNSPECIFIED TYPE (HCC): ICD-10-CM

## 2021-09-22 DIAGNOSIS — I63.9 CEREBROVASCULAR ACCIDENT (CVA), UNSPECIFIED MECHANISM (HCC): ICD-10-CM

## 2021-09-22 NOTE — PROGRESS NOTES
Remote interrogation of implanted cardiac event monitor shows normal function. Patient has a history of A Flutter, pAF, TIA, and CVA. Takes Plavix. Patient's last device interrogation was on 8/23. Since last interrogation, symptoms recorded show symptomatic SR/ST - occasional ectopy. NATASHA DAVIDSON to review. See [facility] report under the Cardiology tab. Follow up 1 month via [employer].

## 2021-10-27 ENCOUNTER — NURSE ONLY (OUTPATIENT)
Dept: CARDIOLOGY CLINIC | Age: 76
End: 2021-10-27
Payer: MEDICARE

## 2021-10-27 DIAGNOSIS — I48.0 PAF (PAROXYSMAL ATRIAL FIBRILLATION) (HCC): ICD-10-CM

## 2021-10-27 DIAGNOSIS — Z45.09 ENCOUNTER FOR ELECTRONIC ANALYSIS OF REVEAL EVENT RECORDER: ICD-10-CM

## 2021-10-27 DIAGNOSIS — I63.9 CEREBROVASCULAR ACCIDENT (CVA), UNSPECIFIED MECHANISM (HCC): ICD-10-CM

## 2021-10-27 DIAGNOSIS — I48.92 ATRIAL FLUTTER, UNSPECIFIED TYPE (HCC): ICD-10-CM

## 2021-10-27 DIAGNOSIS — G45.1 TIA INVOLVING LEFT INTERNAL CAROTID ARTERY: ICD-10-CM

## 2021-10-27 PROCEDURE — G2066 INTER DEVC REMOTE 30D: HCPCS | Performed by: INTERNAL MEDICINE

## 2021-10-27 PROCEDURE — 93298 REM INTERROG DEV EVAL SCRMS: CPT | Performed by: INTERNAL MEDICINE

## 2021-10-28 NOTE — PROGRESS NOTES
Remote interrogation of implanted cardiac event monitor shows normal function. Patient has a history of A Flutter, pAF, TIA, and CVA. Takes Plavix. Patient's last device interrogation was on 9/22. Since last interrogation, symptom recordings show SR/ST with ectopy. pSVT # 9. NATASHA DAVIDSON to review. See [facility] report under the Cardiology tab. Follow up 1 month via [employer].

## 2021-11-02 ENCOUNTER — OFFICE VISIT (OUTPATIENT)
Dept: CARDIOLOGY CLINIC | Age: 76
End: 2021-11-02
Payer: MEDICARE

## 2021-11-02 VITALS
OXYGEN SATURATION: 98 % | SYSTOLIC BLOOD PRESSURE: 122 MMHG | WEIGHT: 130 LBS | HEART RATE: 72 BPM | HEIGHT: 65 IN | DIASTOLIC BLOOD PRESSURE: 60 MMHG | BODY MASS INDEX: 21.66 KG/M2

## 2021-11-02 DIAGNOSIS — I10 ESSENTIAL HYPERTENSION: ICD-10-CM

## 2021-11-02 DIAGNOSIS — I48.0 PAF (PAROXYSMAL ATRIAL FIBRILLATION) (HCC): Primary | ICD-10-CM

## 2021-11-02 DIAGNOSIS — Z95.818 STATUS POST PLACEMENT OF IMPLANTABLE LOOP RECORDER: ICD-10-CM

## 2021-11-02 PROCEDURE — 99214 OFFICE O/P EST MOD 30 MIN: CPT | Performed by: NURSE PRACTITIONER

## 2021-11-02 NOTE — PROGRESS NOTES
tablet Take 1 tablet by mouth 2 times daily as needed   Yes Historical Provider, MD   venlafaxine (EFFEXOR-XR) 75 MG XR capsule Take 75 mg by mouth daily. Yes Historical Provider, MD   Omeprazole (PRILOSEC PO) Take by mouth  Patient not taking: Reported on 8/2/2021    Historical Provider, MD   aspirin 81 MG tablet Take 81 mg by mouth daily. Historical Provider, MD       Allergies:  Cephalexin and Pcn [penicillins]    Social History:   reports that she quit smoking about 42 years ago. She has never used smokeless tobacco. She reports previous alcohol use. She reports that she does not use drugs. Family History: family history is not on file. All 14 point review of systems are completed and pertinent positives are mentioned in the history of present illness. Other systems are reviewed and are negative. PHYSICAL EXAM:    Vital signs:    /60   Pulse 72   Ht 5' 5\" (1.651 m)   Wt 130 lb (59 kg)   SpO2 98%   BMI 21.63 kg/m²      Constitutional and general appearance: alert, cooperative, no distress and appears stated age  HEENT: PERRL, no cervical lymphadenopathy. No masses palpable.  Normal oral mucosa  Respiratory:  · Normal excursion and expansion without use of accessory muscles  · Resp auscultation: Normal breath sounds without wheezing, rhonchi, and rales  Cardiovascular:  · The apical impulse is not displaced  · Heart tones are crisp and normal. regular S1 and S2.  · Jugular venous pulsation Normal  · The carotid upstroke is normal in amplitude and contour without delay or bruit  · Peripheral pulses are symmetrical and full   Abdomen:  · No masses or tenderness  · Bowel sounds present  Extremities:  ·  No cyanosis or clubbing  ·  No lower extremity edema  ·  Skin: warm and dry  Neurological:  · Alert and oriented  · Moves all extremities well  · No abnormalities of mood, affect, memory, mentation, or behavior are noted    DATA:    Echo 6/8/2021:      Conclusions      Summary   Technically

## 2021-11-02 NOTE — LETTER
Quynh Godoy  1945       [facility]   Electrophysiology Outpatient Note              Date:  November 2, 2021  Patient name: Quynh Godoy  YOB: 1945    Primary Care physician: Kristan Lambert    HISTORY OF PRESENT ILLNESS: The patient is a 77 y.o.  female with a history of AF, CAD, CVA, HTN and anxiety. In 2012, she was diagnosed for AF and started on Rythmol. This was later changed to Multaq. In 3/2012, she underwent cryoablation. In 2012, LHC showed normal coronaries. In 2/2017, she wore a monitor that showed single PVC's. In 6/2021, she was admitted for CVA. MRI showed a punctate acute infarct in the right hippocampus. At discharge, she wore a 30 day monitor that showed predominantly NSR, rare PAC's and PVC's and brief PAT. In 8/2021, she underwent implantation of a loop recorder. Device checks have shown no AF. Today she is being seen for AF. Patient complains of occasional palpitations and NATHAN. Denies chest pain and dizziness. Past Medical History:   has a past medical history of Anesthesia complication, Anxiety, Atrial fibrillation ([address] CAD (coronary artery disease), and Rotator cuff tendinitis. Past Surgical History:   has a past surgical history that includes Atrial ablation surgery (3/2012). Home Medications:    Prior to Admission medications    Medication Sig Start Date End Date Taking?  Authorizing Provider   clopidogrel (PLAVIX) 75 MG tablet Take 75 mg by mouth daily   Yes Historical Provider, MD   Probiotic Product (PROBIOTIC DAILY PO) Take by mouth   Yes Historical Provider, MD   irbesartan (AVAPRO) 150 MG tablet Take 75 mg by mouth nightly    Yes Historical Provider, MD   Cetirizine HCl (ZYRTEC PO) Take by mouth   Yes Historical Provider, MD   traZODone (DESYREL) 50 MG tablet Take 50 mg by mouth nightly   Yes Historical Provider, MD   atorvastatin (LIPITOR) 80 MG tablet Take 1 tablet by mouth nightly 6/8/21  Yes David Schwartz MD calcium carbonate 600 MG TABS tablet Take 1 tablet by mouth 2 times daily as needed   Yes Historical Provider, MD   venlafaxine (EFFEXOR-XR) 75 MG XR capsule Take 75 mg by mouth daily. Yes Historical Provider, MD   Omeprazole (PRILOSEC PO) Take by mouth  Patient not taking: Reported on 8/2/2021    Historical Provider, MD   aspirin 81 MG tablet Take 81 mg by mouth daily. Historical Provider, MD       Allergies:  Cephalexin and Pcn [penicillins]    Social History:   reports that she quit smoking about 42 years ago. She has never used smokeless tobacco. She reports previous alcohol use. She reports that she does not use drugs. Family History: family history is not on file. All 14 point review of systems are completed and pertinent positives are mentioned in the history of present illness. Other systems are reviewed and are negative. PHYSICAL EXAM:    Vital signs:    /60   Pulse 72   Ht 5' 5\" (1.651 m)   Wt 130 lb (59 kg)   SpO2 98%   BMI 21.63 kg/m²      Constitutional and general appearance: alert, cooperative, no distress and appears stated age  HEENT: PERRL, no cervical lymphadenopathy. No masses palpable.  Normal oral mucosa  Respiratory:  · Normal excursion and expansion without use of accessory muscles  · Resp auscultation: Normal breath sounds without wheezing, rhonchi, and rales  Cardiovascular:  · The apical impulse is not displaced  · Heart tones are crisp and normal. regular S1 and S2.  · Jugular venous pulsation Normal  · The carotid upstroke is normal in amplitude and contour without delay or bruit  · Peripheral pulses are symmetrical and full   Abdomen:  · No masses or tenderness  · Bowel sounds present  Extremities:  ·  No cyanosis or clubbing  ·  No lower extremity edema  ·  Skin: warm and dry  Neurological:  · Alert and oriented  · Moves all extremities well  · No abnormalities of mood, affect, memory, mentation, or behavior are noted    DATA:    Echo 6/8/2021:    Conclusions      Summary   Technically difficult examination. Normal left ventricular systolic function with ejection fraction of 55-60%. No regional wall motion abnormalites are seen. Normal left ventricular size with mild concentric left ventricular   hypertrophy. Grade I diastolic dysfunction with normal filling pressure. Compared to previous study from 12- no changes noted in left   ventricular function. A bubble study was performed and fails to show evidence of shunting. Stress 8/2021:      Summary    There is normal isotope uptake at stress and rest with mild    breast/soft-tissue attenuation artifact of the anteroseptum. There is no    evidence of myocardial ischemia or scar. Post-stress LVEF is > 70%. All labs and testing reviewed. CARDIOLOGY LABS:   CBC: No results for input(s): WBC, HGB, HCT, PLT in the last 72 hours. BMP: No results for input(s): NA, K, CO2, BUN, CREATININE, LABGLOM, GLUCOSE in the last 72 hours. PT/INR: No results for input(s): PROTIME, INR in the last 72 hours. APTT:No results for input(s): APTT in the last 72 hours. FASTING LIPID PANEL:  Lab Results   Component Value Date    HDL 49 07/16/2021    LDLCALC 45 07/16/2021    TRIG 122 07/16/2021     LIVER PROFILE:No results for input(s): AST, ALT, ALB in the last 72 hours.     IMPRESSION:    Assessment:   Paroxsymal atrial fibrillation: stable, no known recurrence since 2012    -[id number] score 6 (HTN, CVA, age, gender)    -s/p cryoablation 2012  S/P loop recorder implant 8/2021   -no AF detected to date   HTN: controlled   HLD  History of CVA 6/2021      Plan:   Continue ASA and Plavix  If AF is detected on device transmissions, will resume oral anticoagulation   Remote device transmissions every month  Monitor BP at home and call if consistently out of goal ranges  Follow up in 6 months      GADIEL Albert [address] [facility], APRN-CNP  [phone]

## 2021-12-01 ENCOUNTER — NURSE ONLY (OUTPATIENT)
Dept: CARDIOLOGY CLINIC | Age: 76
End: 2021-12-01
Payer: MEDICARE

## 2021-12-01 DIAGNOSIS — Z45.09 ENCOUNTER FOR ELECTRONIC ANALYSIS OF REVEAL EVENT RECORDER: ICD-10-CM

## 2021-12-01 DIAGNOSIS — G45.1 TIA INVOLVING LEFT INTERNAL CAROTID ARTERY: ICD-10-CM

## 2021-12-01 DIAGNOSIS — I48.0 PAF (PAROXYSMAL ATRIAL FIBRILLATION) (HCC): ICD-10-CM

## 2021-12-01 DIAGNOSIS — I63.9 CEREBROVASCULAR ACCIDENT (CVA), UNSPECIFIED MECHANISM (HCC): ICD-10-CM

## 2021-12-01 DIAGNOSIS — I48.92 ATRIAL FLUTTER, UNSPECIFIED TYPE (HCC): ICD-10-CM

## 2021-12-01 PROCEDURE — G2066 INTER DEVC REMOTE 30D: HCPCS | Performed by: INTERNAL MEDICINE

## 2021-12-01 PROCEDURE — 93298 REM INTERROG DEV EVAL SCRMS: CPT | Performed by: INTERNAL MEDICINE

## 2021-12-01 NOTE — PROGRESS NOTES
Remote interrogation of implanted cardiac event monitor shows normal function. Patient has a history of A Flutter, pAF, TIA, and CVA. Takes Plavix. Patient's last device interrogation was on 10/27. Since last interrogation, no arrhythmias recorded. NATASHA DAVIDSON to review. See [employer] report under the Cardiology tab. Follow up 1 month via [employer].

## 2021-12-08 ENCOUNTER — HOSPITAL ENCOUNTER (EMERGENCY)
Age: 76
Discharge: HOME | End: 2021-12-09
Attending: EMERGENCY MEDICINE
Payer: MEDICARE

## 2021-12-08 DIAGNOSIS — R04.0 EPISTAXIS: Primary | ICD-10-CM

## 2021-12-08 PROCEDURE — 99284 EMERGENCY DEPT VISIT MOD MDM: CPT

## 2021-12-08 PROCEDURE — 30905 CONTROL OF NOSEBLEED: CPT

## 2021-12-09 VITALS
OXYGEN SATURATION: 96 % | RESPIRATION RATE: 14 BRPM | BODY MASS INDEX: 21.66 KG/M2 | SYSTOLIC BLOOD PRESSURE: 135 MMHG | HEART RATE: 87 BPM | WEIGHT: 130 LBS | DIASTOLIC BLOOD PRESSURE: 88 MMHG | TEMPERATURE: 98.2 F | HEIGHT: 65 IN

## 2021-12-09 PROCEDURE — 2500000003 HC RX 250 WO HCPCS: Performed by: PHYSICIAN ASSISTANT

## 2021-12-09 PROCEDURE — 6370000000 HC RX 637 (ALT 250 FOR IP)

## 2021-12-09 PROCEDURE — 96374 THER/PROPH/DIAG INJ IV PUSH: CPT

## 2021-12-09 PROCEDURE — 6370000000 HC RX 637 (ALT 250 FOR IP): Performed by: PHYSICIAN ASSISTANT

## 2021-12-09 RX ORDER — OXYMETAZOLINE HYDROCHLORIDE 0.05 G/100ML
SPRAY NASAL
Status: COMPLETED
Start: 2021-12-09 | End: 2021-12-09

## 2021-12-09 RX ORDER — TRANEXAMIC ACID 100 MG/ML
500 INJECTION, SOLUTION INTRAVENOUS ONCE
Status: COMPLETED | OUTPATIENT
Start: 2021-12-09 | End: 2021-12-09

## 2021-12-09 RX ORDER — OXYMETAZOLINE HYDROCHLORIDE 0.05 G/100ML
2 SPRAY NASAL ONCE
Status: COMPLETED | OUTPATIENT
Start: 2021-12-09 | End: 2021-12-09

## 2021-12-09 RX ORDER — ACETAMINOPHEN 325 MG/1
650 TABLET ORAL ONCE
Status: COMPLETED | OUTPATIENT
Start: 2021-12-09 | End: 2021-12-09

## 2021-12-09 RX ADMIN — OXYMETAZOLINE HCL 2 SPRAY: 0.05 SPRAY NASAL at 00:46

## 2021-12-09 RX ADMIN — ACETAMINOPHEN 650 MG: 325 TABLET ORAL at 02:29

## 2021-12-09 RX ADMIN — TRANEXAMIC ACID 500 MG: 1 INJECTION, SOLUTION INTRAVENOUS at 02:29

## 2021-12-09 RX ADMIN — OXYMETAZOLINE HYDROCHLORIDE 2 SPRAY: 0.05 SPRAY NASAL at 00:46

## 2021-12-09 ASSESSMENT — ENCOUNTER SYMPTOMS
ABDOMINAL PAIN: 0
NAUSEA: 1
SINUS PAIN: 0
SORE THROAT: 0
VOMITING: 0

## 2021-12-09 ASSESSMENT — PAIN SCALES - GENERAL: PAINLEVEL_OUTOF10: 5

## 2021-12-09 NOTE — ED NOTES
Bed: 28  Expected date:   Expected time:   Means of arrival:   Comments:  roberto Jacobsen RN  12/08/21 [id number]

## 2021-12-09 NOTE — ED PROVIDER NOTES
I personally evaluated and examined the patient in conjunction with the APC and agree with the assessment, treatment plan and disposition of the patient has recorded by the APC. I reviewed pertinent nurse's notes, triage notes, vital signs, past medical history, family and social history, medications, and allergies. Complete review of systems was conducted by the mid-level provider and/or myself. Review of systems is negative except as documented in the history of present illness. EKG:     FINAL IMPRESSION     1. Epistaxis            Electronically signed by: Tamara A. Maximo Lee, M.D. Duwaine Apley, MD  12/09/21 0259

## 2021-12-09 NOTE — ED PROVIDER NOTES
**ADVANCED PRACTICE PROVIDER, I HAVE EVALUATED THIS [facility]  ED  EMERGENCY DEPARTMENT ENCOUNTER      Pt Name: Jessie Lopez  LVN:[id number]  [place] 1945  Date of evaluation: 12/8/2021  Provider: BERNA Boucher      Chief Complaint:    Chief Complaint   Patient presents with    Epistaxis     on plavix, started at 2145 tonight         Nursing Notes, Past Medical Hx, Past Surgical Hx, Social Hx, Allergies, and Family Hx were all reviewed and agreed with or any disagreements were addressed in the HPI.    HPI: (Location, Duration, Timing, Severity, Quality, Assoc Sx, Context, Modifying factors)    Chief Complaint of epistaxis. This is a  77 y.o. female who presents via private vehicle with past medical history of anxiety, A. fib, and CAD. The patient is on Plavix. She states for approximately 1 week she has had nosebleeds occurring every other day. She states they are typically out of the right nostril although states that sometimes it is out of bilateral nostrils. She states usually after 1 hour of holding pressure the bleeding does subside. She states beginning this evening she began with a nosebleed that has been progressing for approximately 3 hours out of both nostrils. She states she has been applying pressure. Over the last week she has put a humidifier in her house and had her heating and air conditioning evaluated to ensure the humidifier is working appropriately. She does not use any Flonase or nasal sprays. She denies any lightheadedness or dizziness. She states it does feel like the blood is draining down the back of her throat and causing her stomach to feel upset. She denies any previous history of nosebleeds. She denies any blunt trauma.     PastMedical/Surgical History:      Diagnosis Date    Anesthesia complication     Low B/P after atrial ablation (SBP 60)    Anxiety     Atrial fibrillation (HCC)     CAD (coronary artery disease) 5/11/12    at fib    Rotator cuff tendinitis 2/15/2016         Procedure Laterality Date    ATRIAL ABLATION SURGERY  3/2012       Medications:  Discharge Medication List as of 12/9/2021  2:50 AM      CONTINUE these medications which have NOT CHANGED    Details   clopidogrel (PLAVIX) 75 MG tablet Take 75 mg by mouth dailyHistorical Med      Probiotic Product (PROBIOTIC DAILY PO) Take by mouthHistorical Med      irbesartan (AVAPRO) 150 MG tablet Take 75 mg by mouth nightly Historical Med      Cetirizine HCl (ZYRTEC PO) Take by mouthHistorical Med      traZODone (DESYREL) 50 MG tablet Take 50 mg by mouth nightlyHistorical Med      atorvastatin (LIPITOR) 80 MG tablet Take 1 tablet by mouth nightly, Disp-30 tablet, R-3Normal      calcium carbonate 600 MG TABS tablet Take 1 tablet by mouth 2 times daily as neededHistorical Med      venlafaxine (EFFEXOR-XR) 75 MG XR capsule Take 75 mg by mouth daily. Review of Systems:  (2-9 systems needed)  Review of Systems   Constitutional: Negative for chills and fever. HENT: Positive for nosebleeds. Negative for congestion, sinus pain and sore throat. Gastrointestinal: Positive for nausea. Negative for abdominal pain and vomiting. Neurological: Negative for dizziness and light-headedness. Physical Exam:  Physical Exam  Vitals and nursing note reviewed. Constitutional:       Appearance: Normal appearance. She is not diaphoretic. HENT:      Head: Normocephalic and atraumatic. Nose:      Right Nostril: Epistaxis present. No foreign body, septal hematoma or occlusion. Left Nostril: Epistaxis present. No foreign body, septal hematoma or occlusion. Eyes:      General:         Right eye: No discharge. Left eye: No discharge. Pulmonary:      Effort: Pulmonary effort is normal. No respiratory distress. Musculoskeletal:         General: Normal range of motion. Cervical back: Normal range of motion and neck supple.    Skin: General: Skin is warm and dry. Coloration: Skin is not pale. Neurological:      Mental Status: She is alert and oriented to person, place, and time. Psychiatric:         Mood and Affect: Mood normal.         Behavior: Behavior normal.         MEDICAL DECISION MAKING    Vitals:    Vitals:    12/08/21 2322 12/09/21 0248   BP: (!) 170/97 135/88   Pulse: 102 87   Resp: 14 14   Temp: 98.2 °F (36.8 °C)    TempSrc: Temporal    SpO2: 97% 96%   Weight: 130 lb (59 kg)    Height: 5' 5\" (1.651 m)        LABS:Labs Reviewed - No data to display     Remainder of labs reviewed and were negative at this time or not returned at the time of this note. RADIOLOGY:   Non-plain film images such as CT, Ultrasound and MRI are read by the radiologist. BERNA Keyes have directly visualized the radiologic plain film image(s) with the below findings:      Interpretation per the Radiologist below, if available at the time of this note:    No orders to display        No results found. MEDICAL DECISION MAKING / ED COURSE:      Patient was seen and evaluated in the emergency department today by myself and attending physician. All diagnostic, treatment, and disposition decisions were made in conjunction with attending physician. Patient was given:  Medications   oxymetazoline (AFRIN) 0.05 % nasal spray 2 spray (2 sprays Each Nostril Given 12/9/21 0046)   tranexamic acid (CYKLOKAPRON) injection 500 mg (500 mg IntraVENous Given by Other 12/9/21 0229)   acetaminophen (TYLENOL) tablet 650 mg (650 mg Oral Given 12/9/21 0229)     Patient was evaluated in the emergency department today for epistaxis. It was actively bleeding of bilateral sides of her nose for on arrival.  She did have approximately an hour and a half wait in the emergency department for evaluation. Afrin soaked gauze was applied to the patient's nose with a clamp by nursing staff for 15 minutes initially.   Upon reevaluation this did not improve her symptoms therefore it was trialed a second time. The bleeding did not subside at all therefore I did attempt TXA with TXA soaked cotton balls and direct pressure. After approximately 15 minutes the bleeding had not subsided out of either nostril and both cotton balls were removed and discarded. An atomizer was used to place approximately 1% lidocaine with epinephrine in bilateral nostrils. An atomizer was also used to place TXA in bilateral nostrils. At this time a posterior Rhino Rocket was placed. The balloon was inflated using approximately 12cc of air was inserted meeting firm pressure. The patient was then observed for approximately 15 minutes and re-evaluated. Bleeding had appeared to subside and her posterior pharynx was clear. She was discharged with instructions to follow-up with the ENT in the next 24 to 48 hours. The patient tolerated their visit well. I evaluated the patient. The physician was available for consultation as needed. The patient and / or the family were informed of the results of any tests, a time was given to answer questions, a plan was proposed and they agreed with plan. CLINICAL IMPRESSION:  1.  Epistaxis        DISPOSITION Decision To Discharge 12/09/2021 02:46:46 AM      PATIENT REFERRED TO:  Charo Schroeder DO  [address] [place] [address]  [address] [place] [address]  [phone]          [facility]  ED  [address]  Go to   If symptoms worsen      DISCHARGE MEDICATIONS:  Discharge Medication List as of 12/9/2021  2:50 AM          DISCONTINUED MEDICATIONS:  Discharge Medication List as of 12/9/2021  2:50 AM                 (Please note the MDM and HPI sections of this note were completed with a voice recognition program.  Efforts were made to edit the dictations but occasionally words are mis-transcribed.)    Electronically signed, Swati Carranza,           Swati Carranza  12/09/21 2707

## 2021-12-24 ENCOUNTER — HOSPITAL ENCOUNTER (EMERGENCY)
Age: 76
Discharge: HOME | End: 2021-12-24
Payer: MEDICARE

## 2021-12-24 VITALS
SYSTOLIC BLOOD PRESSURE: 140 MMHG | BODY MASS INDEX: 21.66 KG/M2 | WEIGHT: 130 LBS | OXYGEN SATURATION: 95 % | RESPIRATION RATE: 18 BRPM | HEIGHT: 65 IN | HEART RATE: 77 BPM | TEMPERATURE: 98.3 F | DIASTOLIC BLOOD PRESSURE: 81 MMHG

## 2021-12-24 DIAGNOSIS — R04.0 EPISTAXIS: Primary | ICD-10-CM

## 2021-12-24 DIAGNOSIS — Z79.02 ANTIPLATELET OR ANTITHROMBOTIC LONG-TERM USE: ICD-10-CM

## 2021-12-24 PROCEDURE — 99283 EMERGENCY DEPT VISIT LOW MDM: CPT

## 2021-12-24 PROCEDURE — 30903 CONTROL OF NOSEBLEED: CPT

## 2021-12-24 PROCEDURE — 6370000000 HC RX 637 (ALT 250 FOR IP): Performed by: NURSE PRACTITIONER

## 2021-12-24 RX ORDER — OXYMETAZOLINE HYDROCHLORIDE 0.05 G/100ML
2 SPRAY NASAL ONCE
Status: COMPLETED | OUTPATIENT
Start: 2021-12-24 | End: 2021-12-24

## 2021-12-24 RX ADMIN — OXYMETAZOLINE HCL 2 SPRAY: 0.05 SPRAY NASAL at 10:07

## 2021-12-24 RX ADMIN — Medication 1 EACH: at 10:08

## 2021-12-24 NOTE — ED PROVIDER NOTES
Evaluated by Advanced Practice Provider    [facility]  ED    CHIEF COMPLAINT  Epistaxis (sx for 2 hours pt on plavix)    HISTORY OF PRESENT ILLNESS  Davion Loera is a 77 y.o. female who presents to the ED with complaints of epistaxis. At 6:45 am she woke up, was in bed, she could feel something in the back of her throat and then it started gushing from both sides. She has a humidifier next to her in bed. She was here 2 weeks ago for the same thing, had a rhinorocket. She is on plavix. She saw ENT and they cauterized on the septum 2 spots. She has a nosebleed 2 days ago, lasted about 15 minutes and looked like it was coming from the same area that was cauterized. The patient has tried pressure for 15-20 minutes at a time and afrin to stop the bleeding. The patient takes blood thinning medications-plavix. Patient denies any chest pain or shortness of breath, patient denies abdominal pain, nausea, vomiting, diarrhea. Patient denies any fever or chills. No other complaints, modifying factors or associated symptoms. Nursing notes reviewed. Past Medical History:   Diagnosis Date    Anesthesia complication     Low B/P after atrial ablation (SBP 60)    Anxiety     Atrial fibrillation (HCC)     CAD (coronary artery disease) 12    at fib    Rotator cuff tendinitis 2/15/2016     Past Surgical History:   Procedure Laterality Date    ATRIAL ABLATION SURGERY  3/2012     History reviewed. No pertinent family history. Social History     Socioeconomic History    Marital status:       Spouse name: Not on file    Number of children: Not on file    Years of education: Not on file    Highest education level: Not on file   Occupational History    Not on file   Tobacco Use    Smoking status: Former Smoker     Quit date: 1979     Years since quittin.8    Smokeless tobacco: Never Used   Substance and Sexual Activity    Alcohol use: Not Currently    Drug use: No    Sexual activity: Yes     Partners: Male   Other Topics Concern    Not on file   Social History Narrative    Not on file     Social Determinants of Health     Financial Resource Strain:     Difficulty of Paying Living Expenses: Not on file   Food Insecurity:     Worried About Running Out of Food in the Last Year: Not on file    Timothy of Food in the Last Year: Not on file   Transportation Needs:     Lack of Transportation (Medical): Not on file    Lack of Transportation (Non-Medical):  Not on file   Physical Activity:     Days of Exercise per Week: Not on file    Minutes of Exercise per Session: Not on file   Stress:     Feeling of Stress : Not on file   Social Connections:     Frequency of Communication with Friends and Family: Not on file    Frequency of Social Gatherings with Friends and Family: Not on file    Attends Oriental orthodox Services: Not on file    Active Member of [address] [place] [address] [employer] or Organizations: Not on file    Attends Club or Organization Meetings: Not on file    Marital Status: Not on file   Intimate Partner Violence:     Fear of Current or Ex-Partner: Not on file    Emotionally Abused: Not on file    Physically Abused: Not on file    Sexually Abused: Not on file   Housing Stability:     Unable to Pay for Housing in the Last Year: Not on file    Number of [place] in the Last Year: Not on file    Unstable Housing in the Last Year: Not on file     Current Facility-Administered Medications   Medication Dose Route Frequency Provider Last Rate Last Admin    oxymetazoline (AFRIN) 0.05 % nasal spray 2 spray  2 spray Each Nostril Once MARKUS Joyner CNP        silver nitrate applicators applicator 1 each  1 each Topical Once MARKUS Joyner CNP         Current Outpatient Medications   Medication Sig Dispense Refill    clopidogrel (PLAVIX) 75 MG tablet Take 75 mg by mouth daily      Probiotic Product (PROBIOTIC DAILY PO) Take by mouth      irbesartan (AVAPRO) 150 MG tablet Take 75 mg by mouth nightly       Cetirizine HCl (ZYRTEC PO) Take by mouth      traZODone (DESYREL) 50 MG tablet Take 50 mg by mouth nightly      atorvastatin (LIPITOR) 80 MG tablet Take 1 tablet by mouth nightly 30 tablet 3    calcium carbonate 600 MG TABS tablet Take 1 tablet by mouth 2 times daily as needed      venlafaxine (EFFEXOR-XR) 75 MG XR capsule Take 75 mg by mouth daily. Allergies   Allergen Reactions    Cephalexin Hives and Swelling    Pcn [Penicillins] Hives and Swelling     REVIEW OF SYSTEMS    6 systems reviewed, pertinent positives per HPI otherwise noted to be negative    PHYSICAL EXAM  BP (!) 155/91   Pulse 80   Temp 98.3 °F (36.8 °C) (Oral)   Resp 16   Ht 5' 5\" (1.651 m)   Wt 130 lb (59 kg)   SpO2 97%   BMI 21.63 kg/m²   GENERAL APPEARANCE: Awake and alert. Cooperative. No acute distress. HEAD: Normocephalic. Atraumatic. EYES: PERRL. EOM's grossly intact. ENT: Mucous membranes are moist.  No tenderness to palpation of the nose, no bruising, erythema, no edema, no acute deformities noted. Large amount of bleeding noted from right nares, unsure where exactly it is coming from. Bilateral nares are patent. There is no septal deviation or septal hematoma upon exam.  NECK: Supple. Normal ROM. CHEST: Equal symmetric chest rise. Regular rate and rhythm. LUNGS: Breathing is unlabored. Speaking comfortably in full sentences. Abdomen: Nondistended. EXTREMITIES: MAEE. No acute deformities. SKIN: Warm and dry. NEUROLOGICAL: Alert and oriented. Strength is 5/5 in all extremities and sensation is intact. LABS  No results found for this visit on 12/24/21. RADIOLOGY  No results found. PROCEDURE  None      ED COURSE/MDM  Patient seen and evaluated. Old records reviewed. I have evaluated this patient. My supervising physician was available for consultation. Quynh Godoy presented to the ED today with above noted complaints. Physical exam is unremarkable.  The bilateral nares are observed to be red and inflamed. There is no septal hematoma observed. The patient has good air passage through both nares. The posterior oropharynx is without exudate or drainage. The patient denies trouble breathing or swallowing. Denies any complaints of pain to nose or nares. At 0859 5 sprays of afrin to right nares, silver nitrate to right nares as well. Clamp placed. Bleeding had slowed prior to clamp but unfortunately patient sneezed several times before I could place clamp and it started bleeding again. At [address] BALDERAS removed the clamp, there was no bleeding observed. I applied more silver nitrate to the right nares septum. Patient did have sneezing again. No bleeding after. I discussed with the patient her preference. She is very concerned about the possibility of rebleeding and having to come back for another nosebleed. Patient is asking to just have the Rhino Rocket placed for her comfort. She stated that her ENT told her it could stay in place for 3 to 5 days prior to following up in the office. As the patient is okay with having the Rhino Rocket placed to prevent her from having to return to the ER for any further nosebleed I feel that this is reasonable. I did place a Rhino Rocket to the right nares. Patient was given strict ED return precautions and will follow up with her ENT on Monday. At this point I do not feel the patient requires further work up and it is reasonable to discharge the patient. We did discuss signs and symptoms that would necessitate return to the ED. While in ED patient received   Medications   oxymetazoline (AFRIN) 0.05 % nasal spray 2 spray (has no administration in time range)   silver nitrate applicators applicator 1 each (has no administration in time range)       A discussion was had with the patient regarding diagnosis, treatment/ plan of care, and follow up. All questions were answered.  Patient will follow up as directed for further evaluation/treatment. The patient was given strict return precautions as we discussed symptoms that would necessitate return to the ED. Patient will return to ED for new/worsening symptoms. The patient verbalized their understanding and agreement with the above plan. I estimate there is LOW risk for SEVERE BLOOD LOSS, NASAL LESION, OR FOREIGN BODY, DEEP SPACE INFECTION (e.g., BRENTS ANGINA OR RETROPHARYNGEAL ABSCESS), or AIRWAY COMPROMISE, thus I consider the discharge disposition reasonable. Maira Mai and I have discussed the diagnosis and risks, and we agree with discharging home to follow-up with their primary doctor. We also discussed returning to the Emergency Department immediately if new or worsening symptoms occur. We have discussed the symptoms which are most concerning (e.g., changing or worsening pain, trouble swallowing or breathing, neck stiffness or fever) that necessitate immediate return. CLINICAL IMPRESSION  Final diagnoses:   Epistaxis   Antiplatelet or antithrombotic long-term use       Patient was sent home with a prescription for below medication/s. I did [place] patient on appropriate use of these medication. New Prescriptions    No medications on file       [facility]  [address] [place] [facility] [address] [facility]  [phone]    Call   As needed    Jona Cortez MD  [address] [address]  [phone]    Call on 12/27/2021  For further evaluation    [facility] [address]  [phone]  Go to   If symptoms worsen      DISPOSITION  Patient was discharged to home in good condition. Comment: Please note this report has been produced using speech recognition software and may contain errors related to that system including errors in grammar, punctuation, and spelling, as well as words and phrases that may be inappropriate.  If there are any questions or concerns please feel free to contact the dictating provider for clarification.                 John Doyle, MARKUS - CNP  12/24/21 2086

## 2021-12-25 ENCOUNTER — HOSPITAL ENCOUNTER (EMERGENCY)
Age: 76
Discharge: HOME | End: 2021-12-25
Attending: EMERGENCY MEDICINE
Payer: MEDICARE

## 2021-12-25 VITALS
HEIGHT: 65 IN | OXYGEN SATURATION: 95 % | TEMPERATURE: 98 F | RESPIRATION RATE: 18 BRPM | WEIGHT: 130 LBS | DIASTOLIC BLOOD PRESSURE: 85 MMHG | SYSTOLIC BLOOD PRESSURE: 156 MMHG | BODY MASS INDEX: 21.66 KG/M2 | HEART RATE: 79 BPM

## 2021-12-25 DIAGNOSIS — R04.0 EPISTAXIS: Primary | ICD-10-CM

## 2021-12-25 PROCEDURE — 6370000000 HC RX 637 (ALT 250 FOR IP): Performed by: EMERGENCY MEDICINE

## 2021-12-25 PROCEDURE — C9046 COCAINE HCL NASAL SOLUTION: HCPCS | Performed by: EMERGENCY MEDICINE

## 2021-12-25 PROCEDURE — 30903 CONTROL OF NOSEBLEED: CPT

## 2021-12-25 PROCEDURE — 99284 EMERGENCY DEPT VISIT MOD MDM: CPT

## 2021-12-25 RX ADMIN — COCAINE HYDROCHLORIDE: 40 SOLUTION NASAL at 03:13

## 2021-12-25 ASSESSMENT — PAIN SCALES - GENERAL: PAINLEVEL_OUTOF10: 3

## 2021-12-25 NOTE — ED PROVIDER NOTES
CHIEF COMPLAINT  Epistaxis (was seen here on 12/24 and had rhino rocket placed, discharged at 1000 and then tonight pt felt blood going down throat)      HISTORY OF PRESENT ILLNESS  Rakesh Donato  is a 77 y.o. female who presents to the ED at via private vehicle complaining of recurrent right epistaxis. States she has been seen multiple times recently for epistaxis and has required nasal packing previously. Was seen by ENT and underwent some cautery. Was in the ER yesterday and had a Rhino Rocket placed however states this evening she started to have some oozing of blood. She believes the [employer] is not in good position. She takes Plavix at baseline which is likely contributory. She denies fever, chills, sinus pressure, lightheadedness, near syncope. There are no other complaints, modifying factors or associated symptoms. Nursing notes reviewed. Past medical history:  has a past medical history of Anesthesia complication, Anxiety, Atrial fibrillation ([address] CAD (coronary artery disease) (5/11/12), and Rotator cuff tendinitis (2/15/2016). Past surgical history:  has a past surgical history that includes Atrial ablation surgery (3/2012). Home medications:   Prior to Admission medications    Medication Sig Start Date End Date Taking?  Authorizing Provider   clopidogrel (PLAVIX) 75 MG tablet Take 75 mg by mouth daily    Historical Provider, MD   Probiotic Product (PROBIOTIC DAILY PO) Take by mouth    Historical Provider, MD   irbesartan (AVAPRO) 150 MG tablet Take 75 mg by mouth nightly     Historical Provider, MD   Cetirizine HCl (ZYRTEC PO) Take by mouth    Historical Provider, MD   traZODone (DESYREL) 50 MG tablet Take 50 mg by mouth nightly    Historical Provider, MD   atorvastatin (LIPITOR) 80 MG tablet Take 1 tablet by mouth nightly 6/8/21   Ageuda Dupont MD   calcium carbonate 600 MG TABS tablet Take 1 tablet by mouth 2 times daily as needed    Historical Provider, MD venlafaxine (EFFEXOR-XR) 75 MG XR capsule Take 75 mg by mouth daily. Historical Provider, MD       Allergies   Allergen Reactions    Cephalexin Hives and Swelling    Pcn [Penicillins] Hives and Swelling       Social history:  reports that she quit smoking about 42 years ago. She has never used smokeless tobacco. She reports previous alcohol use. She reports that she does not use drugs. Family history:  History reviewed. No pertinent family history. REVIEW OF SYSTEMS  6 systems reviewed, pertinent positives per HPI otherwise noted to be negative    PHYSICAL EXAM  Vitals:    12/25/21 0154   BP: (!) 156/85   Pulse: 79   Resp: 18   Temp: 98 °F (36.7 °C)   SpO2: 95%       GENERAL: Patient is well-developed, well-nourished,  no acute distress. no apparent discomfort. Non toxic appearing. HEENT:  Normocephalic, atraumatic. PERRL. Conjunctiva appear normal.  External ears are normal.  MMM. Bloodsoaked Rhino Rocket in the right nostril which is partially dislodged. When it was removed there was active dripping of blood anteriorly. NECK: Supple with normal ROM. Trachea midline  LUNGS:  Normal work of breathing. Speaking comfortably in full sentences. EXTREMITIES: 2+ distal pulses w/o edema. MUSCULOSKELETAL:  Atraumatic extremities with normal ROM grossly. No obvious bony deformities. SKIN: Warm/dry. No rashes/lesions noted. PSYCHIATRIC: Patient is alert and oriented with normal affect  NEUROLOGIC: Cranial nerves grossly intact. Moves all extremities with equal strength. No gross sensory deficits. Answers questions/follows commands appropriately. ED COURSE/MDM  Nursing notes reviewed. Initial Rhino Rocket appeared partially dislodged and was not secured with tape properly. This was replaced with a 5.5 cm Rhino Rocket with good hemostasis. There is no blood in the posterior oropharynx and patient is well-appearing on reevaluation.   Plan for close outpatient follow-up with her ENT physician versus return with any concerns. Clinical Impression  Based on the presenting complaint, history, and physical exam, multiple diagnoses were considered. Exam and workup here most c/w:  1. Epistaxis        I discussed with Se Garcias the results of evaluation in the ED, diagnosis, care, and prognosis. The plan is to discharge to home. Patient is in agreement with plan and questions have been answered. I also discussed with Se Garcias the reasons which may require a return visit and the importance of follow-up care. The patient is well-appearing, nontoxic, and improved at the time of discharge. Patient agrees to call to arrange follow-up care as directed. Se Yakovshaun understands to return immediately for worsening/change in symptoms. Patient will be started on the following medications from the ED:  New Prescriptions    No medications on file         Disposition  Pt is discharged in stable condition.     Disposition Vitals:  BP (!) 156/85   Pulse 79   Temp 98 °F (36.7 °C) (Oral)   Resp 18   Ht 5' 5\" (1.651 m)   Wt 130 lb (59 kg)   SpO2 95%   BMI 21.63 kg/m²                    Matt Kerr MD  12/25/21 [id number]

## 2021-12-28 ENCOUNTER — TELEPHONE (OUTPATIENT)
Dept: CARDIOLOGY CLINIC | Age: 76
End: 2021-12-28

## 2021-12-28 NOTE — TELEPHONE ENCOUNTER
Pt called I and stated she has had two nose bleeds in the past two weeks and has had to go to ED to get them stopped. Pt wanted to know if she should be on a different blood thinner? Please advise.

## 2021-12-29 NOTE — TELEPHONE ENCOUNTER
Spoke with patient. She is on plavix for neurology reasons and her PCP fills this for her. I advised her to reach out to neurologist and/or PCP regarding nose bleeds.

## 2022-01-05 ENCOUNTER — NURSE ONLY (OUTPATIENT)
Dept: CARDIOLOGY CLINIC | Age: 77
End: 2022-01-05
Payer: MEDICARE

## 2022-01-05 DIAGNOSIS — I63.9 CEREBROVASCULAR ACCIDENT (CVA), UNSPECIFIED MECHANISM (HCC): ICD-10-CM

## 2022-01-05 DIAGNOSIS — I48.0 PAF (PAROXYSMAL ATRIAL FIBRILLATION) (HCC): ICD-10-CM

## 2022-01-05 DIAGNOSIS — Z45.09 ENCOUNTER FOR ELECTRONIC ANALYSIS OF REVEAL EVENT RECORDER: ICD-10-CM

## 2022-01-05 DIAGNOSIS — I48.92 ATRIAL FLUTTER, UNSPECIFIED TYPE (HCC): ICD-10-CM

## 2022-01-06 PROCEDURE — 93298 REM INTERROG DEV EVAL SCRMS: CPT | Performed by: INTERNAL MEDICINE

## 2022-01-06 PROCEDURE — G2066 INTER DEVC REMOTE 30D: HCPCS | Performed by: INTERNAL MEDICINE

## 2022-01-06 NOTE — PROGRESS NOTES
Remote interrogation of implanted cardiac event monitor shows normal function. Patient has a history of A Flutter, pAF, TIA, and CVA. Takes Plavix. Patient's last device interrogation was on 12/1. Since last interrogation, no arrhythmias recorded. NATASHA DAVIDSON to review. See [employer] report under the Cardiology tab. Follow up 1 month via [employer].

## 2022-02-09 ENCOUNTER — NURSE ONLY (OUTPATIENT)
Dept: CARDIOLOGY CLINIC | Age: 77
End: 2022-02-09
Payer: MEDICARE

## 2022-02-09 DIAGNOSIS — I63.9 CEREBROVASCULAR ACCIDENT (CVA), UNSPECIFIED MECHANISM (HCC): ICD-10-CM

## 2022-02-09 DIAGNOSIS — Z45.09 ENCOUNTER FOR ELECTRONIC ANALYSIS OF REVEAL EVENT RECORDER: ICD-10-CM

## 2022-02-10 NOTE — PROGRESS NOTES
Remote transmission received for patients ILR. EP physician will review. See interrogation under the cardiology tab in the [address] [place] [address] [address] field for more details. Will continue to monitor remotely. ILR implanted for CVA, TIA, monitor for AF. No AFib recorded to date. No new arrhythmias/events recorded.

## 2022-02-11 PROCEDURE — G2066 INTER DEVC REMOTE 30D: HCPCS | Performed by: INTERNAL MEDICINE

## 2022-02-11 PROCEDURE — 93298 REM INTERROG DEV EVAL SCRMS: CPT | Performed by: INTERNAL MEDICINE

## 2022-03-16 ENCOUNTER — NURSE ONLY (OUTPATIENT)
Dept: CARDIOLOGY CLINIC | Age: 77
End: 2022-03-16
Payer: MEDICARE

## 2022-03-16 DIAGNOSIS — Z45.09 ENCOUNTER FOR ELECTRONIC ANALYSIS OF REVEAL EVENT RECORDER: ICD-10-CM

## 2022-03-16 DIAGNOSIS — I63.9 CEREBROVASCULAR ACCIDENT (CVA), UNSPECIFIED MECHANISM (HCC): ICD-10-CM

## 2022-03-16 PROCEDURE — G2066 INTER DEVC REMOTE 30D: HCPCS | Performed by: INTERNAL MEDICINE

## 2022-03-16 PROCEDURE — 93298 REM INTERROG DEV EVAL SCRMS: CPT | Performed by: INTERNAL MEDICINE

## 2022-03-17 NOTE — PROGRESS NOTES
Remote transmission received for patients ILR. EP physician will review. See interrogation under the cardiology tab in the [address] [place] [address] [address] field for more details. Will continue to monitor remotely. ILR implanted for CVA, TIA, monitor for AF. No AFib recorded to date. No new arrhythmias/events recorded.

## 2024-01-10 NOTE — TELEPHONE ENCOUNTER
MARTIN- patient is on clopidogrel, that is the only blood thinner I see. She only has seen EP since 2012. Please advise. no

## 2025-01-05 NOTE — TELEPHONE ENCOUNTER
Created telephone encounter. Spoke with Nilsa Cardenas relayed message per [facility] regarding stress test. Pt verbalized understanding. HCC coding opportunities       Chart reviewed, no opportunity found: CHART REVIEWED, NO OPPORTUNITY FOUND        Patients Insurance     Medicare Insurance: [employer]